# Patient Record
Sex: MALE | Race: ASIAN | NOT HISPANIC OR LATINO
[De-identification: names, ages, dates, MRNs, and addresses within clinical notes are randomized per-mention and may not be internally consistent; named-entity substitution may affect disease eponyms.]

---

## 2017-01-17 ENCOUNTER — APPOINTMENT (OUTPATIENT)
Dept: GASTROENTEROLOGY | Facility: CLINIC | Age: 72
End: 2017-01-17

## 2017-02-06 ENCOUNTER — APPOINTMENT (OUTPATIENT)
Dept: INTERNAL MEDICINE | Facility: CLINIC | Age: 72
End: 2017-02-06

## 2017-02-06 ENCOUNTER — LABORATORY RESULT (OUTPATIENT)
Age: 72
End: 2017-02-06

## 2017-02-06 VITALS
RESPIRATION RATE: 16 BRPM | BODY MASS INDEX: 31.58 KG/M2 | OXYGEN SATURATION: 97 % | DIASTOLIC BLOOD PRESSURE: 80 MMHG | WEIGHT: 185 LBS | TEMPERATURE: 98.2 F | SYSTOLIC BLOOD PRESSURE: 120 MMHG | HEIGHT: 64 IN | HEART RATE: 67 BPM

## 2017-02-06 DIAGNOSIS — J20.9 ACUTE BRONCHITIS, UNSPECIFIED: ICD-10-CM

## 2017-02-09 LAB
ALBUMIN SERPL ELPH-MCNC: 4.1 G/DL
ALP BLD-CCNC: 69 U/L
ALT SERPL-CCNC: 12 U/L
ANION GAP SERPL CALC-SCNC: 14 MMOL/L
AST SERPL-CCNC: 18 U/L
BASOPHILS # BLD AUTO: 0.1 K/UL
BASOPHILS NFR BLD AUTO: 1.3 %
BILIRUB SERPL-MCNC: 0.4 MG/DL
BUN SERPL-MCNC: 15 MG/DL
CALCIUM SERPL-MCNC: 9.5 MG/DL
CHLORIDE SERPL-SCNC: 105 MMOL/L
CHOLEST SERPL-MCNC: 145 MG/DL
CHOLEST/HDLC SERPL: 2.8 RATIO
CO2 SERPL-SCNC: 24 MMOL/L
CREAT SERPL-MCNC: 1.34 MG/DL
EOSINOPHIL # BLD AUTO: 0.25 K/UL
EOSINOPHIL NFR BLD AUTO: 3.3 %
GGT SERPL-CCNC: 17 U/L
GLUCOSE SERPL-MCNC: 108 MG/DL
HBA1C MFR BLD HPLC: 6.4 %
HCT VFR BLD CALC: 35.8 %
HDLC SERPL-MCNC: 52 MG/DL
HGB BLD-MCNC: 10.6 G/DL
IMM GRANULOCYTES NFR BLD AUTO: 0.3 %
LDLC SERPL CALC-MCNC: 66 MG/DL
LYMPHOCYTES # BLD AUTO: 1.75 K/UL
LYMPHOCYTES NFR BLD AUTO: 23.1 %
MAN DIFF?: NORMAL
MCHC RBC-ENTMCNC: 24.5 PG
MCHC RBC-ENTMCNC: 29.6 GM/DL
MCV RBC AUTO: 82.9 FL
MONOCYTES # BLD AUTO: 0.78 K/UL
MONOCYTES NFR BLD AUTO: 10.3 %
NEUTROPHILS # BLD AUTO: 4.68 K/UL
NEUTROPHILS NFR BLD AUTO: 61.7 %
PLATELET # BLD AUTO: 290 K/UL
POTASSIUM SERPL-SCNC: 4.5 MMOL/L
PROT SERPL-MCNC: 6.5 G/DL
RBC # BLD: 4.32 M/UL
RBC # FLD: 14.8 %
SODIUM SERPL-SCNC: 143 MMOL/L
T3 SERPL-MCNC: 101 NG/DL
T4 FREE SERPL-MCNC: 1.8 NG/DL
TRIGL SERPL-MCNC: 133 MG/DL
TSH SERPL-ACNC: 2.48 UIU/ML
WBC # FLD AUTO: 7.58 K/UL

## 2017-04-14 ENCOUNTER — TRANSCRIPTION ENCOUNTER (OUTPATIENT)
Age: 72
End: 2017-04-14

## 2017-05-09 ENCOUNTER — MOBILE ON CALL (OUTPATIENT)
Age: 72
End: 2017-05-09

## 2017-05-15 ENCOUNTER — TRANSCRIPTION ENCOUNTER (OUTPATIENT)
Age: 72
End: 2017-05-15

## 2017-05-24 ENCOUNTER — APPOINTMENT (OUTPATIENT)
Dept: CARDIOLOGY | Facility: CLINIC | Age: 72
End: 2017-05-24

## 2017-05-24 ENCOUNTER — NON-APPOINTMENT (OUTPATIENT)
Age: 72
End: 2017-05-24

## 2017-05-24 VITALS
HEIGHT: 64 IN | SYSTOLIC BLOOD PRESSURE: 113 MMHG | BODY MASS INDEX: 31.76 KG/M2 | OXYGEN SATURATION: 97 % | HEART RATE: 64 BPM | DIASTOLIC BLOOD PRESSURE: 72 MMHG | WEIGHT: 186 LBS

## 2017-06-05 ENCOUNTER — APPOINTMENT (OUTPATIENT)
Dept: INTERNAL MEDICINE | Facility: CLINIC | Age: 72
End: 2017-06-05

## 2017-06-05 VITALS
BODY MASS INDEX: 32.27 KG/M2 | TEMPERATURE: 97.9 F | OXYGEN SATURATION: 97 % | HEART RATE: 82 BPM | WEIGHT: 189 LBS | RESPIRATION RATE: 16 BRPM | SYSTOLIC BLOOD PRESSURE: 120 MMHG | HEIGHT: 64 IN | DIASTOLIC BLOOD PRESSURE: 80 MMHG

## 2017-06-05 RX ORDER — ESOMEPRAZOLE MAGNESIUM 20 MG/1
20 CAPSULE, DELAYED RELEASE ORAL
Qty: 90 | Refills: 0 | Status: DISCONTINUED | COMMUNITY
Start: 2017-02-03 | End: 2017-06-05

## 2017-06-08 ENCOUNTER — APPOINTMENT (OUTPATIENT)
Dept: CARDIOLOGY | Facility: CLINIC | Age: 72
End: 2017-06-08

## 2017-08-01 LAB
BASOPHILS # BLD AUTO: 0.07 K/UL
BASOPHILS NFR BLD AUTO: 0.9 %
EOSINOPHIL # BLD AUTO: 0.27 K/UL
EOSINOPHIL NFR BLD AUTO: 3.5 %
HCT VFR BLD CALC: 35.8 %
HGB BLD-MCNC: 11.1 G/DL
IMM GRANULOCYTES NFR BLD AUTO: 0 %
LYMPHOCYTES # BLD AUTO: 1.78 K/UL
LYMPHOCYTES NFR BLD AUTO: 23.3 %
MAN DIFF?: NORMAL
MCHC RBC-ENTMCNC: 25.6 PG
MCHC RBC-ENTMCNC: 31 GM/DL
MCV RBC AUTO: 82.5 FL
MONOCYTES # BLD AUTO: 0.75 K/UL
MONOCYTES NFR BLD AUTO: 9.8 %
NEUTROPHILS # BLD AUTO: 4.78 K/UL
NEUTROPHILS NFR BLD AUTO: 62.5 %
PLATELET # BLD AUTO: 275 K/UL
RBC # BLD: 4.34 M/UL
RBC # FLD: 15.4 %
WBC # FLD AUTO: 7.65 K/UL

## 2017-08-03 LAB
25(OH)D3 SERPL-MCNC: 37.3 NG/ML
ALBUMIN SERPL ELPH-MCNC: 3.9 G/DL
ALP BLD-CCNC: 84 U/L
ALT SERPL-CCNC: 13 U/L
ANION GAP SERPL CALC-SCNC: 14 MMOL/L
APPEARANCE: CLEAR
AST SERPL-CCNC: 18 U/L
BILIRUB SERPL-MCNC: 0.5 MG/DL
BILIRUBIN URINE: NEGATIVE
BLOOD URINE: NEGATIVE
BUN SERPL-MCNC: 16 MG/DL
CALCIUM SERPL-MCNC: 9.6 MG/DL
CHLORIDE SERPL-SCNC: 106 MMOL/L
CHOLEST SERPL-MCNC: 142 MG/DL
CHOLEST/HDLC SERPL: 3 RATIO
CO2 SERPL-SCNC: 21 MMOL/L
COLOR: YELLOW
CREAT SERPL-MCNC: 1.37 MG/DL
CREAT SPEC-SCNC: 49 MG/DL
ERYTHROCYTE [SEDIMENTATION RATE] IN BLOOD BY WESTERGREN METHOD: 20 MM/HR
FOLATE SERPL-MCNC: 15.2 NG/ML
GGT SERPL-CCNC: 18 U/L
GLUCOSE QUALITATIVE U: NORMAL MG/DL
GLUCOSE SERPL-MCNC: 98 MG/DL
HBA1C MFR BLD HPLC: 6 %
HDLC SERPL-MCNC: 47 MG/DL
HEMOCCULT STL QL IA: NEGATIVE
IRON SATN MFR SERPL: 11 %
IRON SERPL-MCNC: 41 UG/DL
KETONES URINE: NEGATIVE
LDLC SERPL CALC-MCNC: 74 MG/DL
LEUKOCYTE ESTERASE URINE: NEGATIVE
MICROALBUMIN 24H UR DL<=1MG/L-MCNC: 0.3 MG/DL
MICROALBUMIN/CREAT 24H UR-RTO: 6 MG/G
NITRITE URINE: NEGATIVE
PH URINE: 6
POTASSIUM SERPL-SCNC: 4.7 MMOL/L
PROT SERPL-MCNC: 6.4 G/DL
PROTEIN URINE: NEGATIVE MG/DL
PSA FREE FLD-MCNC: 58.7
PSA FREE SERPL-MCNC: 0.27 NG/ML
PSA SERPL-MCNC: 0.46 NG/ML
SODIUM SERPL-SCNC: 141 MMOL/L
SPECIFIC GRAVITY URINE: 1.01
T3 SERPL-MCNC: 99 NG/DL
T4 FREE SERPL-MCNC: 1.9 NG/DL
TIBC SERPL-MCNC: 383 UG/DL
TRIGL SERPL-MCNC: 104 MG/DL
TSH SERPL-ACNC: 2.15 UIU/ML
UIBC SERPL-MCNC: 342 UG/DL
UROBILINOGEN URINE: NORMAL MG/DL
VIT B12 SERPL-MCNC: 198 PG/ML

## 2017-09-05 ENCOUNTER — APPOINTMENT (OUTPATIENT)
Dept: INTERNAL MEDICINE | Facility: CLINIC | Age: 72
End: 2017-09-05

## 2017-09-26 ENCOUNTER — APPOINTMENT (OUTPATIENT)
Dept: INTERNAL MEDICINE | Facility: CLINIC | Age: 72
End: 2017-09-26
Payer: MEDICARE

## 2017-09-26 VITALS
RESPIRATION RATE: 18 BRPM | TEMPERATURE: 98.3 F | HEIGHT: 64 IN | BODY MASS INDEX: 31.92 KG/M2 | OXYGEN SATURATION: 98 % | WEIGHT: 187 LBS | HEART RATE: 71 BPM | SYSTOLIC BLOOD PRESSURE: 130 MMHG | DIASTOLIC BLOOD PRESSURE: 80 MMHG

## 2017-09-26 PROCEDURE — 99214 OFFICE O/P EST MOD 30 MIN: CPT

## 2017-10-24 ENCOUNTER — APPOINTMENT (OUTPATIENT)
Dept: GASTROENTEROLOGY | Facility: CLINIC | Age: 72
End: 2017-10-24
Payer: MEDICARE

## 2017-10-24 VITALS
SYSTOLIC BLOOD PRESSURE: 126 MMHG | WEIGHT: 185 LBS | RESPIRATION RATE: 16 BRPM | BODY MASS INDEX: 31.58 KG/M2 | DIASTOLIC BLOOD PRESSURE: 74 MMHG | TEMPERATURE: 97.8 F | HEART RATE: 88 BPM | HEIGHT: 64 IN

## 2017-10-24 PROCEDURE — 99204 OFFICE O/P NEW MOD 45 MIN: CPT

## 2017-10-24 RX ORDER — AMOXICILLIN AND CLAVULANATE POTASSIUM 500; 125 MG/1; MG/1
500-125 TABLET, FILM COATED ORAL TWICE DAILY
Qty: 14 | Refills: 0 | Status: DISCONTINUED | COMMUNITY
Start: 2017-09-26 | End: 2017-10-24

## 2017-10-24 RX ORDER — PROMETHAZINE HYDROCHLORIDE 6.25 MG/5ML
6.25 SOLUTION ORAL
Qty: 1 | Refills: 5 | Status: DISCONTINUED | COMMUNITY
Start: 2017-09-26 | End: 2017-10-24

## 2017-11-22 ENCOUNTER — APPOINTMENT (OUTPATIENT)
Dept: CARDIOLOGY | Facility: CLINIC | Age: 72
End: 2017-11-22
Payer: MEDICARE

## 2017-11-22 VITALS
WEIGHT: 180 LBS | HEIGHT: 64 IN | DIASTOLIC BLOOD PRESSURE: 76 MMHG | OXYGEN SATURATION: 97 % | SYSTOLIC BLOOD PRESSURE: 117 MMHG | HEART RATE: 65 BPM | BODY MASS INDEX: 30.73 KG/M2

## 2017-11-22 PROCEDURE — 93000 ELECTROCARDIOGRAM COMPLETE: CPT

## 2017-11-22 PROCEDURE — 99214 OFFICE O/P EST MOD 30 MIN: CPT

## 2017-11-22 RX ORDER — CHLORHEXIDINE GLUCONATE 4 %
325 (65 FE) LIQUID (ML) TOPICAL TWICE DAILY
Qty: 60 | Refills: 5 | Status: DISCONTINUED | COMMUNITY
End: 2017-11-22

## 2017-11-27 ENCOUNTER — OUTPATIENT (OUTPATIENT)
Dept: OUTPATIENT SERVICES | Facility: HOSPITAL | Age: 72
LOS: 1 days | End: 2017-11-27
Payer: MEDICARE

## 2017-11-27 ENCOUNTER — RESULT REVIEW (OUTPATIENT)
Age: 72
End: 2017-11-27

## 2017-11-27 DIAGNOSIS — Z95.5 PRESENCE OF CORONARY ANGIOPLASTY IMPLANT AND GRAFT: Chronic | ICD-10-CM

## 2017-11-27 DIAGNOSIS — D64.9 ANEMIA, UNSPECIFIED: ICD-10-CM

## 2017-11-27 DIAGNOSIS — Z98.89 OTHER SPECIFIED POSTPROCEDURAL STATES: Chronic | ICD-10-CM

## 2017-11-27 DIAGNOSIS — H26.40 UNSPECIFIED SECONDARY CATARACT: Chronic | ICD-10-CM

## 2017-11-27 DIAGNOSIS — Z87.442 PERSONAL HISTORY OF URINARY CALCULI: Chronic | ICD-10-CM

## 2017-11-27 DIAGNOSIS — Z96.652 PRESENCE OF LEFT ARTIFICIAL KNEE JOINT: Chronic | ICD-10-CM

## 2017-11-27 PROCEDURE — 88305 TISSUE EXAM BY PATHOLOGIST: CPT

## 2017-11-27 PROCEDURE — 45380 COLONOSCOPY AND BIOPSY: CPT

## 2017-11-27 PROCEDURE — 88312 SPECIAL STAINS GROUP 1: CPT

## 2017-11-27 PROCEDURE — 43239 EGD BIOPSY SINGLE/MULTIPLE: CPT

## 2017-11-27 PROCEDURE — 88312 SPECIAL STAINS GROUP 1: CPT | Mod: 26

## 2017-11-27 PROCEDURE — 88305 TISSUE EXAM BY PATHOLOGIST: CPT | Mod: 26

## 2017-12-19 ENCOUNTER — APPOINTMENT (OUTPATIENT)
Dept: GASTROENTEROLOGY | Facility: CLINIC | Age: 72
End: 2017-12-19
Payer: MEDICARE

## 2017-12-19 ENCOUNTER — APPOINTMENT (OUTPATIENT)
Dept: INTERNAL MEDICINE | Facility: CLINIC | Age: 72
End: 2017-12-19
Payer: MEDICARE

## 2017-12-19 VITALS
HEART RATE: 82 BPM | BODY MASS INDEX: 30.73 KG/M2 | SYSTOLIC BLOOD PRESSURE: 100 MMHG | HEIGHT: 64 IN | WEIGHT: 180 LBS | TEMPERATURE: 98.1 F | DIASTOLIC BLOOD PRESSURE: 62 MMHG | RESPIRATION RATE: 16 BRPM

## 2017-12-19 VITALS
TEMPERATURE: 98.1 F | RESPIRATION RATE: 18 BRPM | HEIGHT: 64 IN | BODY MASS INDEX: 30.73 KG/M2 | WEIGHT: 180 LBS | SYSTOLIC BLOOD PRESSURE: 110 MMHG | HEART RATE: 82 BPM | DIASTOLIC BLOOD PRESSURE: 70 MMHG

## 2017-12-19 PROCEDURE — G0009: CPT

## 2017-12-19 PROCEDURE — 99214 OFFICE O/P EST MOD 30 MIN: CPT | Mod: 25

## 2017-12-19 PROCEDURE — 90670 PCV13 VACCINE IM: CPT

## 2017-12-19 PROCEDURE — 99213 OFFICE O/P EST LOW 20 MIN: CPT

## 2017-12-20 LAB
ALBUMIN SERPL ELPH-MCNC: 3.8 G/DL
ALP BLD-CCNC: 85 U/L
ALT SERPL-CCNC: 13 U/L
ANION GAP SERPL CALC-SCNC: 12 MMOL/L
AST SERPL-CCNC: 19 U/L
BASOPHILS # BLD AUTO: 0.05 K/UL
BASOPHILS NFR BLD AUTO: 0.7 %
BILIRUB SERPL-MCNC: 0.5 MG/DL
BUN SERPL-MCNC: 16 MG/DL
CALCIUM SERPL-MCNC: 9.8 MG/DL
CHLORIDE SERPL-SCNC: 108 MMOL/L
CHOLEST SERPL-MCNC: 136 MG/DL
CHOLEST/HDLC SERPL: 2.8 RATIO
CO2 SERPL-SCNC: 21 MMOL/L
CREAT SERPL-MCNC: 1.55 MG/DL
EOSINOPHIL # BLD AUTO: 0.3 K/UL
EOSINOPHIL NFR BLD AUTO: 4.2
GGT SERPL-CCNC: 23 U/L
GLUCOSE SERPL-MCNC: 86 MG/DL
HBA1C MFR BLD HPLC: 6 %
HCT VFR BLD CALC: 39.2 %
HDLC SERPL-MCNC: 48 MG/DL
HGB BLD-MCNC: 12.2 G/DL
IMM GRANULOCYTES NFR BLD AUTO: 0.4 %
LDLC SERPL CALC-MCNC: 51 MG/DL
LYMPHOCYTES # BLD AUTO: 1.78 K/UL
LYMPHOCYTES NFR BLD AUTO: 24.9 %
MAN DIFF?: NORMAL
MCHC RBC-ENTMCNC: 26.8 PG
MCHC RBC-ENTMCNC: 31.1 GM/DL
MCV RBC AUTO: 86.2 FL
MONOCYTES # BLD AUTO: 0.92 K/UL
MONOCYTES NFR BLD AUTO: 12.9 %
NEUTROPHILS # BLD AUTO: 4.06 K/UL
NEUTROPHILS NFR BLD AUTO: 56.9 %
PLATELET # BLD AUTO: 268 K/UL
POTASSIUM SERPL-SCNC: 4.5 MMOL/L
PROT SERPL-MCNC: 6.5 G/DL
RBC # BLD: 4.55 M/UL
RBC # FLD: 14.8 %
SODIUM SERPL-SCNC: 141 MMOL/L
T3 SERPL-MCNC: 108 NG/DL
T4 FREE SERPL-MCNC: 2.2 NG/DL
TRIGL SERPL-MCNC: 185 MG/DL
TSH SERPL-ACNC: 0.25 UIU/ML
WBC # FLD AUTO: 7.14 K/UL

## 2018-02-01 ENCOUNTER — INPATIENT (INPATIENT)
Facility: HOSPITAL | Age: 73
LOS: 0 days | Discharge: HOME | DRG: 313 | End: 2018-02-02
Attending: INTERNAL MEDICINE | Admitting: INTERNAL MEDICINE
Payer: MEDICARE

## 2018-02-01 VITALS
HEART RATE: 72 BPM | TEMPERATURE: 98 F | RESPIRATION RATE: 18 BRPM | OXYGEN SATURATION: 98 % | SYSTOLIC BLOOD PRESSURE: 119 MMHG | DIASTOLIC BLOOD PRESSURE: 63 MMHG

## 2018-02-01 DIAGNOSIS — E78.5 HYPERLIPIDEMIA, UNSPECIFIED: ICD-10-CM

## 2018-02-01 DIAGNOSIS — I10 ESSENTIAL (PRIMARY) HYPERTENSION: ICD-10-CM

## 2018-02-01 DIAGNOSIS — H26.40 UNSPECIFIED SECONDARY CATARACT: Chronic | ICD-10-CM

## 2018-02-01 DIAGNOSIS — Z87.442 PERSONAL HISTORY OF URINARY CALCULI: Chronic | ICD-10-CM

## 2018-02-01 DIAGNOSIS — E03.9 HYPOTHYROIDISM, UNSPECIFIED: ICD-10-CM

## 2018-02-01 DIAGNOSIS — R07.9 CHEST PAIN, UNSPECIFIED: ICD-10-CM

## 2018-02-01 DIAGNOSIS — Z95.5 PRESENCE OF CORONARY ANGIOPLASTY IMPLANT AND GRAFT: Chronic | ICD-10-CM

## 2018-02-01 DIAGNOSIS — Z98.89 OTHER SPECIFIED POSTPROCEDURAL STATES: Chronic | ICD-10-CM

## 2018-02-01 DIAGNOSIS — Z96.652 PRESENCE OF LEFT ARTIFICIAL KNEE JOINT: Chronic | ICD-10-CM

## 2018-02-01 DIAGNOSIS — Z29.9 ENCOUNTER FOR PROPHYLACTIC MEASURES, UNSPECIFIED: ICD-10-CM

## 2018-02-01 DIAGNOSIS — I25.10 ATHEROSCLEROTIC HEART DISEASE OF NATIVE CORONARY ARTERY WITHOUT ANGINA PECTORIS: ICD-10-CM

## 2018-02-01 LAB
ALBUMIN SERPL ELPH-MCNC: 3.4 G/DL — LOW (ref 3.5–5)
ALP SERPL-CCNC: 94 U/L — SIGNIFICANT CHANGE UP (ref 40–120)
ALT FLD-CCNC: 20 U/L DA — SIGNIFICANT CHANGE UP (ref 10–60)
ANION GAP SERPL CALC-SCNC: 6 MMOL/L — SIGNIFICANT CHANGE UP (ref 5–17)
AST SERPL-CCNC: 24 U/L — SIGNIFICANT CHANGE UP (ref 10–40)
BASOPHILS # BLD AUTO: 0.1 K/UL — SIGNIFICANT CHANGE UP (ref 0–0.2)
BASOPHILS NFR BLD AUTO: 1.1 % — SIGNIFICANT CHANGE UP (ref 0–2)
BILIRUB SERPL-MCNC: 0.3 MG/DL — SIGNIFICANT CHANGE UP (ref 0.2–1.2)
BUN SERPL-MCNC: 18 MG/DL — SIGNIFICANT CHANGE UP (ref 7–18)
CALCIUM SERPL-MCNC: 9.6 MG/DL — SIGNIFICANT CHANGE UP (ref 8.4–10.5)
CHLORIDE SERPL-SCNC: 109 MMOL/L — HIGH (ref 96–108)
CK MB BLD-MCNC: 2.1 % — SIGNIFICANT CHANGE UP (ref 0–3.5)
CK MB CFR SERPL CALC: 4.6 NG/ML — HIGH (ref 0–3.6)
CK SERPL-CCNC: 214 U/L — SIGNIFICANT CHANGE UP (ref 35–232)
CO2 SERPL-SCNC: 27 MMOL/L — SIGNIFICANT CHANGE UP (ref 22–31)
CREAT SERPL-MCNC: 1.58 MG/DL — HIGH (ref 0.5–1.3)
EOSINOPHIL # BLD AUTO: 0.3 K/UL — SIGNIFICANT CHANGE UP (ref 0–0.5)
EOSINOPHIL NFR BLD AUTO: 3 % — SIGNIFICANT CHANGE UP (ref 0–6)
GLUCOSE SERPL-MCNC: 107 MG/DL — HIGH (ref 70–99)
HCT VFR BLD CALC: 41.4 % — SIGNIFICANT CHANGE UP (ref 39–50)
HGB BLD-MCNC: 12.4 G/DL — LOW (ref 13–17)
LIDOCAIN IGE QN: 202 U/L — SIGNIFICANT CHANGE UP (ref 73–393)
LYMPHOCYTES # BLD AUTO: 2 K/UL — SIGNIFICANT CHANGE UP (ref 1–3.3)
LYMPHOCYTES # BLD AUTO: 22.1 % — SIGNIFICANT CHANGE UP (ref 13–44)
MCHC RBC-ENTMCNC: 25.8 PG — LOW (ref 27–34)
MCHC RBC-ENTMCNC: 29.9 GM/DL — LOW (ref 32–36)
MCV RBC AUTO: 86.3 FL — SIGNIFICANT CHANGE UP (ref 80–100)
MONOCYTES # BLD AUTO: 0.8 K/UL — SIGNIFICANT CHANGE UP (ref 0–0.9)
MONOCYTES NFR BLD AUTO: 9.3 % — SIGNIFICANT CHANGE UP (ref 2–14)
NEUTROPHILS # BLD AUTO: 5.7 K/UL — SIGNIFICANT CHANGE UP (ref 1.8–7.4)
NEUTROPHILS NFR BLD AUTO: 64.4 % — SIGNIFICANT CHANGE UP (ref 43–77)
NT-PROBNP SERPL-SCNC: 159 PG/ML — SIGNIFICANT CHANGE UP (ref 0–450)
PLATELET # BLD AUTO: 314 K/UL — SIGNIFICANT CHANGE UP (ref 150–400)
POTASSIUM SERPL-MCNC: 4.3 MMOL/L — SIGNIFICANT CHANGE UP (ref 3.5–5.3)
POTASSIUM SERPL-SCNC: 4.3 MMOL/L — SIGNIFICANT CHANGE UP (ref 3.5–5.3)
PROT SERPL-MCNC: 7.2 G/DL — SIGNIFICANT CHANGE UP (ref 6–8.3)
RBC # BLD: 4.79 M/UL — SIGNIFICANT CHANGE UP (ref 4.2–5.8)
RBC # FLD: 14.2 % — SIGNIFICANT CHANGE UP (ref 10.3–14.5)
SODIUM SERPL-SCNC: 142 MMOL/L — SIGNIFICANT CHANGE UP (ref 135–145)
TROPONIN I SERPL-MCNC: <0.015 NG/ML — SIGNIFICANT CHANGE UP (ref 0–0.04)
WBC # BLD: 8.9 K/UL — SIGNIFICANT CHANGE UP (ref 3.8–10.5)
WBC # FLD AUTO: 8.9 K/UL — SIGNIFICANT CHANGE UP (ref 3.8–10.5)

## 2018-02-01 PROCEDURE — 99223 1ST HOSP IP/OBS HIGH 75: CPT | Mod: AI,GC

## 2018-02-01 PROCEDURE — 71046 X-RAY EXAM CHEST 2 VIEWS: CPT | Mod: 26

## 2018-02-01 PROCEDURE — 99285 EMERGENCY DEPT VISIT HI MDM: CPT

## 2018-02-01 RX ORDER — ATORVASTATIN CALCIUM 80 MG/1
40 TABLET, FILM COATED ORAL AT BEDTIME
Qty: 0 | Refills: 0 | Status: DISCONTINUED | OUTPATIENT
Start: 2018-02-01 | End: 2018-02-02

## 2018-02-01 RX ORDER — SODIUM CHLORIDE 9 MG/ML
3 INJECTION INTRAMUSCULAR; INTRAVENOUS; SUBCUTANEOUS ONCE
Qty: 0 | Refills: 0 | Status: COMPLETED | OUTPATIENT
Start: 2018-02-01 | End: 2018-02-01

## 2018-02-01 RX ORDER — ASPIRIN/CALCIUM CARB/MAGNESIUM 324 MG
325 TABLET ORAL
Qty: 0 | Refills: 0 | Status: DISCONTINUED | OUTPATIENT
Start: 2018-02-01 | End: 2018-02-01

## 2018-02-01 RX ORDER — ASPIRIN/CALCIUM CARB/MAGNESIUM 324 MG
325 TABLET ORAL ONCE
Qty: 0 | Refills: 0 | Status: COMPLETED | OUTPATIENT
Start: 2018-02-01 | End: 2018-02-01

## 2018-02-01 RX ORDER — METOPROLOL TARTRATE 50 MG
25 TABLET ORAL
Qty: 0 | Refills: 0 | Status: DISCONTINUED | OUTPATIENT
Start: 2018-02-01 | End: 2018-02-02

## 2018-02-01 RX ORDER — ASPIRIN/CALCIUM CARB/MAGNESIUM 324 MG
81 TABLET ORAL DAILY
Qty: 0 | Refills: 0 | Status: DISCONTINUED | OUTPATIENT
Start: 2018-02-02 | End: 2018-02-02

## 2018-02-01 RX ORDER — PANTOPRAZOLE SODIUM 20 MG/1
40 TABLET, DELAYED RELEASE ORAL
Qty: 0 | Refills: 0 | Status: DISCONTINUED | OUTPATIENT
Start: 2018-02-01 | End: 2018-02-02

## 2018-02-01 RX ORDER — LEVOTHYROXINE SODIUM 125 MCG
112 TABLET ORAL DAILY
Qty: 0 | Refills: 0 | Status: DISCONTINUED | OUTPATIENT
Start: 2018-02-01 | End: 2018-02-02

## 2018-02-01 RX ADMIN — ATORVASTATIN CALCIUM 40 MILLIGRAM(S): 80 TABLET, FILM COATED ORAL at 19:55

## 2018-02-01 RX ADMIN — SODIUM CHLORIDE 3 MILLILITER(S): 9 INJECTION INTRAMUSCULAR; INTRAVENOUS; SUBCUTANEOUS at 17:16

## 2018-02-01 RX ADMIN — Medication 325 MILLIGRAM(S): at 19:55

## 2018-02-01 NOTE — H&P ADULT - HISTORY OF PRESENT ILLNESS
71 y/o M  with PMHx of CAD s/p 1 stent , pre Diabetes, HLD (controlled with medication), HTN (controlled with medication), Hypothyroidism, Kidney Stones, and Obesity  presents to ED c/o intermittent chest pain x 3 days. Pt describes CP as pressure-like, and of sudden onset with slight radiation to the left.  Pt  also has been complaining of dry cough notes having a dry (non-productive) for about a week, but has improve. Effort to contact his radiologist Dr. Cardenas, NPO called back and states that pt had normal stress test 2 years ago. Pt denies fever, chills, vomiting, b/l leg swelling, SOB, dizziness, lightheadedness, or any other complaints.

## 2018-02-01 NOTE — H&P ADULT - NSHPPHYSICALEXAM_GEN_ALL_CORE
Gen: NAD  HEENT: supple neck, no JVD  CVS: RRR, no RMG  Lungs: CTA x2, no wheezing, no rales, no rhonchi  Abd: soft, non tender, no rigidity, no BS  Ext: no edema, no cyanosis  Neuro: AAOx3, no focal findings  Skin: no rashes no blisters

## 2018-02-01 NOTE — ED PROVIDER NOTE - PROGRESS NOTE DETAILS
Made effort to contact Dr. Cardenas pt cardiologist, callback from NP, also d/w pt daughter MD who agree to plan.

## 2018-02-01 NOTE — ED PROVIDER NOTE - PSH
After cataract, bilateral    H/O heart artery stent  2013  H/O total knee replacement, left    History of kidney stones    S/P hernia repair  left inguinal

## 2018-02-01 NOTE — ED ADULT NURSE NOTE - ED STAT RN HANDOFF DETAILS 2
Received pt from [facility], in no distress. Pt on tele, breathing room air. Wife at bedside. Nursing monitoring continues.

## 2018-02-01 NOTE — H&P ADULT - FAMILY HISTORY
Family history of hypertension, mother     Sibling  Still living? Yes, Estimated age: Age Unknown  Family history of diabetes mellitus, Age at diagnosis: Age Unknown

## 2018-02-01 NOTE — H&P ADULT - ASSESSMENT
71 y/o M  with PMHx of CAD s/p 1 stent , pre Diabetes, HLD (controlled with medication), HTN (controlled with medication), Hypothyroidism, Kidney Stones, and Obesity  presents to ED c/o intermittent chest pain x 3 days. Pt describes CP as pressure-like, and of sudden onset with slight radiation to the left.  Pt  also has been complaining of dry cough notes having a dry (non-productive) for about a week, but has improve.

## 2018-02-01 NOTE — H&P ADULT - ATTENDING COMMENTS
Patient was seen and examined in the ED with Dr. Dill yesterday.   He presented with intermittent chest pain, including  at rest.  He has a history of DM, HLD, HTN, and ASCVD.      WDWN 73 yo man in NAD  Lungs, clear  Cor, RRR    IMP:  Chest pain in a patient with known ASCVD.  No current EKG changes.  R/o MI.   HTN, DM, hypothyroidism, stable.    Plan:  Tele/troponin/cardiology consultation.           I have left a voicemail for Dr. Thad Ewing, his PMD.

## 2018-02-01 NOTE — ED PROVIDER NOTE - PMH
Borderline diabetes    CAD (coronary artery disease)    Hyperlipidemia    Hypertension    Hypothyroid    Obesity (BMI 30-39.9)

## 2018-02-01 NOTE — H&P ADULT - PROBLEM SELECTOR PLAN 1
74 yo M w/ hx CAD s/p 1 stent present to ED complaints of CP x 3 days  Heart score= 3  Pt has been complaining of cough for about a week which could be also the cause of the pain   Due to high risk with r/o ACS  Admit to tele  ASA, statin, BB  t1 negative, trend trops   will get echo   As per cardiologist office pt has negative stress test 2 years ago   Cardio: Dr Fonseca 72 yo M w/ hx CAD s/p 1 stent present to ED complaints of CP x 3 days  Heart score= 3  Pt has been complaining of cough for about a week which could be also the cause of the pain   Due to high risk with r/o ACS  EKG with no ischemic changes   Admit to tele  ASA, statin, BB  t1 negative, trend trops   will get echo   As per cardiologist office pt has negative stress test 2 years ago   Cardio: Dr Fonseca

## 2018-02-01 NOTE — ED PROVIDER NOTE - OBJECTIVE STATEMENT
73 y/o M pt with PMHx of CAD, Borderline Diabetes, HLD (controlled with medication), HTN (controlled with medication), Hypothyroidism, Kidney Stones, and Obesity and PSHx of b/l Cataract Surgery, Heart Artery Stent Placement, L TKR, and L Inguinal Hernia Repair presents with wife to ED c/o intermittent chest pain x2 days. Pt describes CP as pressure-like, and of sudden onset; CP began while pt was attempting to sleep x2 days ago. Per pt, CP is currently mild in ED. Pt additionally reports back pain. Pt notes having a dry (non-productive) cough approximately x1 week ago, which has since resolved. Pt denies fever, chills, phlegm, vomiting, b/l leg swelling, SOB, dizziness, lightheadedness, or any other complaints. Pt also denies Hx of MI, or having had similar sx's in the past. Pt states having a normal appetite. PMD: Dr. Thad Ewing.

## 2018-02-01 NOTE — ED PROVIDER NOTE - CHPI ED SYMPTOMS NEG
no chills/no phlegm, no b/l leg swelling, no lightheadedness/no shortness of breath/no dizziness/no fever/no vomiting

## 2018-02-02 ENCOUNTER — TRANSCRIPTION ENCOUNTER (OUTPATIENT)
Age: 73
End: 2018-02-02

## 2018-02-02 VITALS
SYSTOLIC BLOOD PRESSURE: 139 MMHG | OXYGEN SATURATION: 100 % | HEART RATE: 60 BPM | TEMPERATURE: 98 F | RESPIRATION RATE: 18 BRPM | DIASTOLIC BLOOD PRESSURE: 75 MMHG

## 2018-02-02 LAB
ANION GAP SERPL CALC-SCNC: 6 MMOL/L — SIGNIFICANT CHANGE UP (ref 5–17)
BUN SERPL-MCNC: 17 MG/DL — SIGNIFICANT CHANGE UP (ref 7–18)
CALCIUM SERPL-MCNC: 9.6 MG/DL — SIGNIFICANT CHANGE UP (ref 8.4–10.5)
CHLORIDE SERPL-SCNC: 112 MMOL/L — HIGH (ref 96–108)
CHOLEST SERPL-MCNC: 135 MG/DL — SIGNIFICANT CHANGE UP (ref 10–199)
CK MB BLD-MCNC: 2.1 % — SIGNIFICANT CHANGE UP (ref 0–3.5)
CK MB BLD-MCNC: 2.3 % — SIGNIFICANT CHANGE UP (ref 0–3.5)
CK MB CFR SERPL CALC: 2.7 NG/ML — SIGNIFICANT CHANGE UP (ref 0–3.6)
CK MB CFR SERPL CALC: 3.6 NG/ML — SIGNIFICANT CHANGE UP (ref 0–3.6)
CK SERPL-CCNC: 128 U/L — SIGNIFICANT CHANGE UP (ref 35–232)
CK SERPL-CCNC: 158 U/L — SIGNIFICANT CHANGE UP (ref 35–232)
CO2 SERPL-SCNC: 28 MMOL/L — SIGNIFICANT CHANGE UP (ref 22–31)
CREAT SERPL-MCNC: 1.41 MG/DL — HIGH (ref 0.5–1.3)
FOLATE SERPL-MCNC: 14.9 NG/ML — SIGNIFICANT CHANGE UP (ref 4.8–24.2)
GLUCOSE SERPL-MCNC: 100 MG/DL — HIGH (ref 70–99)
HBA1C BLD-MCNC: 6.2 % — HIGH (ref 4–5.6)
HCT VFR BLD CALC: 40.4 % — SIGNIFICANT CHANGE UP (ref 39–50)
HDLC SERPL-MCNC: 43 MG/DL — SIGNIFICANT CHANGE UP (ref 40–125)
HGB BLD-MCNC: 12.1 G/DL — LOW (ref 13–17)
LIPID PNL WITH DIRECT LDL SERPL: 63 MG/DL — SIGNIFICANT CHANGE UP
MAGNESIUM SERPL-MCNC: 2.2 MG/DL — SIGNIFICANT CHANGE UP (ref 1.6–2.6)
MCHC RBC-ENTMCNC: 26 PG — LOW (ref 27–34)
MCHC RBC-ENTMCNC: 30 GM/DL — LOW (ref 32–36)
MCV RBC AUTO: 86.8 FL — SIGNIFICANT CHANGE UP (ref 80–100)
PHOSPHATE SERPL-MCNC: 3 MG/DL — SIGNIFICANT CHANGE UP (ref 2.5–4.5)
PLATELET # BLD AUTO: 272 K/UL — SIGNIFICANT CHANGE UP (ref 150–400)
POTASSIUM SERPL-MCNC: 4 MMOL/L — SIGNIFICANT CHANGE UP (ref 3.5–5.3)
POTASSIUM SERPL-SCNC: 4 MMOL/L — SIGNIFICANT CHANGE UP (ref 3.5–5.3)
RAPID RVP RESULT: SIGNIFICANT CHANGE UP
RBC # BLD: 4.65 M/UL — SIGNIFICANT CHANGE UP (ref 4.2–5.8)
RBC # FLD: 14.2 % — SIGNIFICANT CHANGE UP (ref 10.3–14.5)
SODIUM SERPL-SCNC: 146 MMOL/L — HIGH (ref 135–145)
TOTAL CHOLESTEROL/HDL RATIO MEASUREMENT: 3.1 RATIO — LOW (ref 3.4–9.6)
TRIGL SERPL-MCNC: 147 MG/DL — SIGNIFICANT CHANGE UP (ref 10–149)
TROPONIN I SERPL-MCNC: <0.015 NG/ML — SIGNIFICANT CHANGE UP (ref 0–0.04)
TROPONIN I SERPL-MCNC: <0.015 NG/ML — SIGNIFICANT CHANGE UP (ref 0–0.04)
TSH SERPL-MCNC: 5.58 UU/ML — HIGH (ref 0.34–4.82)
VIT B12 SERPL-MCNC: 311 PG/ML — SIGNIFICANT CHANGE UP (ref 232–1245)
WBC # BLD: 8.9 K/UL — SIGNIFICANT CHANGE UP (ref 3.8–10.5)
WBC # FLD AUTO: 8.9 K/UL — SIGNIFICANT CHANGE UP (ref 3.8–10.5)

## 2018-02-02 PROCEDURE — 82746 ASSAY OF FOLIC ACID SERUM: CPT

## 2018-02-02 PROCEDURE — 83036 HEMOGLOBIN GLYCOSYLATED A1C: CPT

## 2018-02-02 PROCEDURE — 84443 ASSAY THYROID STIM HORMONE: CPT

## 2018-02-02 PROCEDURE — 82550 ASSAY OF CK (CPK): CPT

## 2018-02-02 PROCEDURE — 82607 VITAMIN B-12: CPT

## 2018-02-02 PROCEDURE — 99239 HOSP IP/OBS DSCHRG MGMT >30: CPT

## 2018-02-02 PROCEDURE — 84100 ASSAY OF PHOSPHORUS: CPT

## 2018-02-02 PROCEDURE — 83690 ASSAY OF LIPASE: CPT

## 2018-02-02 PROCEDURE — 80061 LIPID PANEL: CPT

## 2018-02-02 PROCEDURE — 83880 ASSAY OF NATRIURETIC PEPTIDE: CPT

## 2018-02-02 PROCEDURE — 83735 ASSAY OF MAGNESIUM: CPT

## 2018-02-02 PROCEDURE — 84484 ASSAY OF TROPONIN QUANT: CPT

## 2018-02-02 PROCEDURE — 99222 1ST HOSP IP/OBS MODERATE 55: CPT

## 2018-02-02 PROCEDURE — 82553 CREATINE MB FRACTION: CPT

## 2018-02-02 PROCEDURE — 71046 X-RAY EXAM CHEST 2 VIEWS: CPT

## 2018-02-02 PROCEDURE — 87633 RESP VIRUS 12-25 TARGETS: CPT

## 2018-02-02 PROCEDURE — 80053 COMPREHEN METABOLIC PANEL: CPT

## 2018-02-02 PROCEDURE — 93005 ELECTROCARDIOGRAM TRACING: CPT

## 2018-02-02 PROCEDURE — 99285 EMERGENCY DEPT VISIT HI MDM: CPT | Mod: 25

## 2018-02-02 PROCEDURE — 80048 BASIC METABOLIC PNL TOTAL CA: CPT

## 2018-02-02 PROCEDURE — 85027 COMPLETE CBC AUTOMATED: CPT

## 2018-02-02 RX ADMIN — PANTOPRAZOLE SODIUM 40 MILLIGRAM(S): 20 TABLET, DELAYED RELEASE ORAL at 08:15

## 2018-02-02 RX ADMIN — Medication 25 MILLIGRAM(S): at 06:37

## 2018-02-02 RX ADMIN — Medication 81 MILLIGRAM(S): at 10:56

## 2018-02-02 RX ADMIN — Medication 112 MICROGRAM(S): at 06:37

## 2018-02-02 NOTE — PROGRESS NOTE ADULT - SUBJECTIVE AND OBJECTIVE BOX
HPI:  73 y/o M  with PMHx of CAD s/p 1 stent , pre Diabetes, HLD (controlled with medication), HTN (controlled with medication), Hypothyroidism, Kidney Stones, and Obesity  presents to ED c/o intermittent chest pain x 3 days. Pt describes CP as pressure-like, and of sudden onset with slight radiation to the left.  Pt  also has been complaining of dry cough notes having a dry (non-productive) for about a week, but has improve. Effort to contact his radiologist Dr. Cardenas, NPO called back and states that pt had normal stress test 2 years ago. Pt denies fever, chills, vomiting, b/l leg swelling, SOB, dizziness, lightheadedness, or any other complaints. (01 Feb 2018 19:26)      Patient is a 72y old  Male who presents with a chief complaint of chest pain x 3 days (01 Feb 2018 19:26)      INTERVAL HPI/OVERNIGHT EVENTS:  T(C): 36.5 (02-02-18 @ 11:30), Max: 36.9 (02-01-18 @ 20:35)  HR: 60 (02-02-18 @ 11:30) (60 - 76)  BP: 139/75 (02-02-18 @ 11:30) (119/55 - 139/75)  RR: 18 (02-02-18 @ 11:30) (17 - 18)  SpO2: 100% (02-02-18 @ 11:30) (97% - 100%)  Wt(kg): --  I&O's Summary      REVIEW OF SYSTEMS: denies fever, chills, SOB, palpitations, chest pain, abdominal pain, nausea, vomitting, diarrhea, constipation, dizziness    MEDICATIONS  (STANDING):  aspirin enteric coated 81 milliGRAM(s) Oral daily  atorvastatin 40 milliGRAM(s) Oral at bedtime  levothyroxine 112 MICROGram(s) Oral daily  metoprolol     tartrate 25 milliGRAM(s) Oral two times a day  pantoprazole    Tablet 40 milliGRAM(s) Oral before breakfast    MEDICATIONS  (PRN):      PHYSICAL EXAM:  GENERAL: NAD, well-groomed, well-developed  HEAD:  Atraumatic, Normocephalic  EYES: EOMI, PERRLA, conjunctiva and sclera clear  ENMT: No tonsillar erythema, exudates, or enlargement; Moist mucous membranes, Good dentition, No lesions  NECK: Supple, No JVD, Normal thyroid  NERVOUS SYSTEM:  Alert & Oriented X3, Good concentration; Motor Strength 5/5 B/L upper and lower extremities; DTRs 2+ intact and symmetric  CHEST/LUNG: Clear to percussion bilaterally; No rales, rhonchi, wheezing, or rubs  HEART: Regular rate and rhythm; No murmurs, rubs, or gallops  ABDOMEN: Soft, Nontender, Nondistended; Bowel sounds present  EXTREMITIES:  2+ Peripheral Pulses, No clubbing, cyanosis, or edema  LYMPH: No lymphadenopathy noted  SKIN: No rashes or lesions  LABS:                        12.1   8.9   )-----------( 272      ( 02 Feb 2018 06:03 )             40.4     02-02    146<H>  |  112<H>  |  17  ----------------------------<  100<H>  4.0   |  28  |  1.41<H>    Ca    9.6      02 Feb 2018 06:03  Phos  3.0     02-02  Mg     2.2     02-02    TPro  7.2  /  Alb  3.4<L>  /  TBili  0.3  /  DBili  x   /  AST  24  /  ALT  20  /  AlkPhos  94  02-01    Troponin I, Serum (02.02.18 @ 06:03)    Troponin I, Serum: <0.015: The new reference range for     Troponin I, Serum (02.02.18 @ 00:33)    Troponin I, Serum: <0.015:     Troponin I, Serum (02.01.18 @ 17:06)    Troponin I, Serum: <0.015: The new reference range for     IMP:  ASCVD, clinically stable.  Cardiology consultation, seen and appreciated.  Patient is scheduled for elective stress test next week.     Plan:  D/C home.  F/u stress test at Dr. Cardenas, next week.   F/u Dr. Ewing.

## 2018-02-02 NOTE — CONSULT NOTE ADULT - SUBJECTIVE AND OBJECTIVE BOX
CHIEF COMPLAINT: Chest pain    HPI: 73 yo M with CAD s/p stenting (2013), HTN, HLD who presented with chest pain. Patient reports    PAST MEDICAL & SURGICAL HISTORY:  As above, also Borderline diabetes, Obesity (BMI 30-39.9), Hypothyroid, S/P hernia repair: left inguinal, History of kidney stones,  H/O total knee replacement, left      Allergies    No Known Allergies    MEDICATIONS  (STANDING):  aspirin enteric coated 81 milliGRAM(s) Oral daily  atorvastatin 40 milliGRAM(s) Oral at bedtime  levothyroxine 112 MICROGram(s) Oral daily  metoprolol     tartrate 25 milliGRAM(s) Oral two times a day  pantoprazole    Tablet 40 milliGRAM(s) Oral before breakfast    MEDICATIONS  (PRN):      FAMILY HISTORY:  Family history of hypertension: mother  Family history of diabetes mellitus (Sibling): sister and bothers    No family history of premature coronary artery disease or sudden cardiac death    SOCIAL HISTORY:  Smoking-  Alcohol-  Ilicit Drug use-    REVIEW OF SYSTEMS:  Constitutional: [ ] fever, [ ]weight loss,  [ ]fatigue  Eyes: [ ] visual changes  Respiratory: [ ]shortness of breath;  [ ] cough, [ ]wheezing, [ ]chills, [ ]hemoptysis  Cardiovascular: [ ] chest pain, [ ]palpitations, [ ]dizziness,  [ ]leg swelling  Gastrointestinal: [ ] abdominal pain, [ ]nausea, [ ]vomiting,  [ ]diarrhea   Genitourinary: [ ] dysuria, [ ] hematuria  Neurologic: [ ] headaches [ ] tremors  [ ] weakness [ ] lightheadedness  Skin: [ ] itching, [ ]burning, [ ] rashes  Endocrine: [ ] heat or cold intolerance  Musculoskeletal: [ ] joint pain or swelling; [ ] muscle, back, or extremity pain  Psychiatric: [ ] depression, [ ]anxiety, [ ]mood swings, or [ ]difficulty sleeping  Hematologic: [ ] easy bruising, [ ] bleeding gums       [ x] All others negative	  [ ] Unable to obtain    Vital Signs Last 24 Hrs  T(C): 36.7 (02 Feb 2018 07:48), Max: 36.9 (01 Feb 2018 20:35)  T(F): 98 (02 Feb 2018 07:48), Max: 98.4 (01 Feb 2018 20:35)  HR: 69 (02 Feb 2018 07:48) (67 - 76)  BP: 119/55 (02 Feb 2018 07:48) (119/55 - 131/66)  BP(mean): --  RR: 17 (02 Feb 2018 07:48) (17 - 18)  SpO2: 97% (02 Feb 2018 07:48) (97% - 99%)  I&O's Summary      PHYSICAL EXAM:  General: No acute distress  HEENT: EOMI, PERRL  Neck: Supple, No JVD  Lungs: Clear to auscultation bilaterally; No rales or wheezing  Heart: Regular rate and rhythm; No murmurs, rubs, or gallops  Abdomen: Nontender, bowel sounds present  Extremities: No clubbing, cyanosis, or edema  Nervous system:  Alert & Oriented X3, no focal deficits  Psychiatric: Normal affect  Skin: No rashes or lesions      LABS:  02-02    146<H>  |  112<H>  |  17  ----------------------------<  100<H>  4.0   |  28  |  1.41<H>    Ca    9.6      02 Feb 2018 06:03  Phos  3.0     02-02  Mg     2.2     02-02    TPro  7.2  /  Alb  3.4<L>  /  TBili  0.3  /  DBili  x   /  AST  24  /  ALT  20  /  AlkPhos  94  02-01    Creatinine Trend: 1.41<--, 1.58<--                        12.1   8.9   )-----------( 272      ( 02 Feb 2018 06:03 )             40.4         Lipid Panel: Cholesterol, Serum 135  Direct LDL 63  HDL Cholesterol, Serum 43  Triglycerides, Serum 147    Cardiac Enzymes: CARDIAC MARKERS ( 02 Feb 2018 06:03 )  <0.015 ng/mL / x     / 128 U/L / x     / 2.7 ng/mL  CARDIAC MARKERS ( 02 Feb 2018 00:33 )  <0.015 ng/mL / x     / 158 U/L / x     / 3.6 ng/mL  CARDIAC MARKERS ( 01 Feb 2018 17:06 )  <0.015 ng/mL / x     / 214 U/L / x     / 4.6 ng/mL      Serum Pro-Brain Natriuretic Peptide: 159 pg/mL (02-01-18 @ 17:06)    RADIOLOGY: < from: Xray Chest 2 Views PA/Lat (02.01.18 @ 17:14) >  Impression: No radiographic evidence for acute cardiopulmonary disease.    < end of copied text >      ECG [my interpretation]:    TELEMETRY:     ECHO:    STRESS TEST: < from: Nuclear Stress Test-Exercise (06.07.16 @ 13:19) >  * The left ventricle was normal in size. Normal myocardial  perfusion scan, with no evidence of infarction or  inducible ischemia.  * Post-stress resting myocardial perfusion gated SPECT  imaging was performed (LVEF = 60 %;LVEDV = 64 ml.),  revealing normal left ventricular function.    < end of copied text >      CATHETERIZATION: CHIEF COMPLAINT: Chest pain    HPI: 73 yo M with CAD s/p stenting (2013), HTN, HLD who presented with chest pain. Patient reports he developed chest pain on Thursday evening at 0PM, as he was getting ready to go to sleep. It was pinching/squeezing in nature, located on the left side of his chest, without radiation, Not associated with  dyspnea, palpitations, LH, or other symptoms. Pain was intermittent on/off, prompting him to go to the ED. Patient reports this is not his anginal equivalent; he had dyspnea in 2013 when he had his stent. Additionally, patient relates he had recent episode of URI with frequent non-productive cough. Patient is unsure if the chest pain was worsened by the cough.     Patient is currently symptom free.       PAST MEDICAL & SURGICAL HISTORY:  As above, also Borderline diabetes, Obesity (BMI 30-39.9), Hypothyroid, S/P hernia repair: left inguinal, History of kidney stones,  H/O total knee replacement, left      Allergies    No Known Allergies    MEDICATIONS  (STANDING):  aspirin enteric coated 81 milliGRAM(s) Oral daily  atorvastatin 40 milliGRAM(s) Oral at bedtime  levothyroxine 112 MICROGram(s) Oral daily  metoprolol     tartrate 25 milliGRAM(s) Oral two times a day  pantoprazole    Tablet 40 milliGRAM(s) Oral before breakfast    MEDICATIONS  (PRN):      FAMILY HISTORY:  Family history of hypertension: mother  Family history of diabetes mellitus (Sibling): sister and bothers    SOCIAL HISTORY:  Smoking- Former 30 pack-year smoker, quit 20 years ago  Alcohol- shot of whiskey daily  Ilicit Drug use-denies    REVIEW OF SYSTEMS:  Constitutional: [ ] fever, [ ]weight loss,  [ ]fatigue  Eyes: [ ] visual changes  Respiratory: [ ]shortness of breath;  [ ] cough, [ ]wheezing, [ ]chills, [ ]hemoptysis  Cardiovascular: [ ] chest pain, [ ]palpitations, [ ]dizziness,  [ ]leg swelling  Gastrointestinal: [ ] abdominal pain, [ ]nausea, [ ]vomiting,  [ ]diarrhea   Genitourinary: [ ] dysuria, [ ] hematuria  Neurologic: [ ] headaches [ ] tremors  [ ] weakness [ ] lightheadedness  Skin: [ ] itching, [ ]burning, [ ] rashes  Endocrine: [ ] heat or cold intolerance  Musculoskeletal: [ ] joint pain or swelling; [ ] muscle, back, or extremity pain  Psychiatric: [ ] depression, [ ]anxiety, [ ]mood swings, or [ ]difficulty sleeping  Hematologic: [ ] easy bruising, [ ] bleeding gums       [ x] All others negative	  [ ] Unable to obtain    Vital Signs Last 24 Hrs  T(C): 36.7 (02 Feb 2018 07:48), Max: 36.9 (01 Feb 2018 20:35)  T(F): 98 (02 Feb 2018 07:48), Max: 98.4 (01 Feb 2018 20:35)  HR: 69 (02 Feb 2018 07:48) (67 - 76)  BP: 119/55 (02 Feb 2018 07:48) (119/55 - 131/66)  BP(mean): --  RR: 17 (02 Feb 2018 07:48) (17 - 18)  SpO2: 97% (02 Feb 2018 07:48) (97% - 99%)  I&O's Summary      PHYSICAL EXAM:  General: No acute distress  HEENT: EOMI, PERRL  Neck: Supple, No JVD  Lungs: Clear to auscultation bilaterally; No rales or wheezing  Heart: Regular rate and rhythm; No murmurs, rubs, or gallops  Abdomen: Nontender, bowel sounds present  Extremities: No clubbing, cyanosis, or edema  Nervous system:  Alert & Oriented X3, no focal deficits  Psychiatric: Normal affect  Skin: No rashes or lesions      LABS:  02-02    146<H>  |  112<H>  |  17  ----------------------------<  100<H>  4.0   |  28  |  1.41<H>    Ca    9.6      02 Feb 2018 06:03  Phos  3.0     02-02  Mg     2.2     02-02    TPro  7.2  /  Alb  3.4<L>  /  TBili  0.3  /  DBili  x   /  AST  24  /  ALT  20  /  AlkPhos  94  02-01    Creatinine Trend: 1.41<--, 1.58<--                        12.1   8.9   )-----------( 272      ( 02 Feb 2018 06:03 )             40.4         Lipid Panel: Cholesterol, Serum 135  Direct LDL 63  HDL Cholesterol, Serum 43  Triglycerides, Serum 147    Cardiac Enzymes: CARDIAC MARKERS ( 02 Feb 2018 06:03 )  <0.015 ng/mL / x     / 128 U/L / x     / 2.7 ng/mL  CARDIAC MARKERS ( 02 Feb 2018 00:33 )  <0.015 ng/mL / x     / 158 U/L / x     / 3.6 ng/mL  CARDIAC MARKERS ( 01 Feb 2018 17:06 )  <0.015 ng/mL / x     / 214 U/L / x     / 4.6 ng/mL      Serum Pro-Brain Natriuretic Peptide: 159 pg/mL (02-01-18 @ 17:06)    RADIOLOGY: < from: Xray Chest 2 Views PA/Lat (02.01.18 @ 17:14) >  Impression: No radiographic evidence for acute cardiopulmonary disease.    < end of copied text >      ECG [my interpretation]: 2/1/2016 @ 15:55: Normal sinus rhythm, normal axis, normal EKG    TELEMETRY: Sinus rhythm, no events      STRESS TEST: < from: Nuclear Stress Test-Exercise (06.07.16 @ 13:19) >  * The left ventricle was normal in size. Normal myocardial  perfusion scan, with no evidence of infarction or  inducible ischemia.  * Post-stress resting myocardial perfusion gated SPECT  imaging was performed (LVEF = 60 %;LVEDV = 64 ml.),  revealing normal left ventricular function.    < end of copied text >

## 2018-02-02 NOTE — DISCHARGE NOTE ADULT - HOSPITAL COURSE
71 y/o male with PMH of CAD s/p 1 stent , pre Diabetes, HLD (controlled with medication), HTN (controlled with medication), Hypothyroidism, Kidney Stones, and Obesity  presents to ED c/o intermittent chest pain x 3 days.  Described CP as pressure-like, and of sudden onset with slight radiation to the left.  Reported dry cough (non-productive) for about a week.   Denied fever, chills, vomiting, b/l leg swelling, SOB, dizziness, lightheadedness, or any other complaints.     Admitted to medicine to Rule Out ACS  Chest Pain  Heart score= 3  EKG with no ischemic changes   Monitored on Telemetry  Troponin remained negative  Continued Aspirin, Atorvastatin, & Metoprolol  Cardiologist- Dr. Fonseca consulted  Outpatient Cardiologist Dr. Cardenas had negative stress test 2 years ago     Hyperlipidemia  Continued Atorvastatin    CAD (Coronary Artery Disease)  Continued Aspirin, Atorvastatin, & Metoprolol    Hypothyroid  TSH c/w hypothyroidism  Continued Synthroid     Hypertension  Blood pressure stable  Continued Metoprolol   Amlodipine was held due to labile blood pressure    Need for Prophylactic Measure  Improve=1 an warranted no DVT prophylaxis

## 2018-02-02 NOTE — DISCHARGE NOTE ADULT - CARE PLAN
Principal Discharge DX:	Chest pain  Goal:	Resolved  Assessment and plan of treatment:	You have an appointment to see your Cardiologist-Dr. Cardenas on Feb. 10th @ 8:20AM.  Please follow up w/ your Cardiologist  Secondary Diagnosis:	Hypertension  Goal:	Maintain normal blood pressure  Assessment and plan of treatment:	Adhere to low salt diet  Your blood pressure was low and your Amlodipine was not given to you.  Please discuss w/ Dr. Cardenas.  Please monitor your blood pressure at home daily.  Secondary Diagnosis:	Hyperlipidemia

## 2018-02-02 NOTE — DISCHARGE NOTE ADULT - CARE PROVIDER_API CALL
Austyn Cardenas), Cardiovascular Disease; Interventional Cardiology  [address] [place] [address]  Phone: [phone]  Fax: [phone]

## 2018-02-02 NOTE — DISCHARGE NOTE ADULT - PLAN OF CARE
Resolved You have an appointment to see your Cardiologist-Dr. Cardenas on Feb. 10th @ 8:20AM.  Please follow up w/ your Cardiologist Maintain normal blood pressure Adhere to low salt diet  Your blood pressure was low and your Amlodipine was not given to you.  Please discuss w/ Dr. Cardenas.  Please monitor your blood pressure at home daily.

## 2018-02-02 NOTE — DISCHARGE NOTE ADULT - PATIENT PORTAL LINK FT
“You can access the [employer] Patient Portal, offered by [facility], by registering with the following website: [email or web address]”

## 2018-02-10 ENCOUNTER — NON-APPOINTMENT (OUTPATIENT)
Age: 73
End: 2018-02-10

## 2018-02-10 ENCOUNTER — APPOINTMENT (OUTPATIENT)
Dept: CARDIOLOGY | Facility: CLINIC | Age: 73
End: 2018-02-10
Payer: MEDICARE

## 2018-02-10 VITALS
DIASTOLIC BLOOD PRESSURE: 71 MMHG | HEIGHT: 64 IN | SYSTOLIC BLOOD PRESSURE: 113 MMHG | WEIGHT: 180 LBS | OXYGEN SATURATION: 97 % | HEART RATE: 66 BPM | BODY MASS INDEX: 30.73 KG/M2

## 2018-02-10 PROCEDURE — 93000 ELECTROCARDIOGRAM COMPLETE: CPT

## 2018-02-10 PROCEDURE — 99214 OFFICE O/P EST MOD 30 MIN: CPT

## 2018-03-28 ENCOUNTER — OUTPATIENT (OUTPATIENT)
Dept: OUTPATIENT SERVICES | Facility: HOSPITAL | Age: 73
LOS: 1 days | End: 2018-03-28
Payer: COMMERCIAL

## 2018-03-28 ENCOUNTER — APPOINTMENT (OUTPATIENT)
Dept: CARDIOLOGY | Facility: CLINIC | Age: 73
End: 2018-03-28

## 2018-03-28 DIAGNOSIS — Z00.8 ENCOUNTER FOR OTHER GENERAL EXAMINATION: ICD-10-CM

## 2018-03-28 DIAGNOSIS — Z96.652 PRESENCE OF LEFT ARTIFICIAL KNEE JOINT: Chronic | ICD-10-CM

## 2018-03-28 DIAGNOSIS — H26.40 UNSPECIFIED SECONDARY CATARACT: Chronic | ICD-10-CM

## 2018-03-28 DIAGNOSIS — Z98.89 OTHER SPECIFIED POSTPROCEDURAL STATES: Chronic | ICD-10-CM

## 2018-03-28 DIAGNOSIS — Z87.442 PERSONAL HISTORY OF URINARY CALCULI: Chronic | ICD-10-CM

## 2018-03-28 DIAGNOSIS — Z95.5 PRESENCE OF CORONARY ANGIOPLASTY IMPLANT AND GRAFT: Chronic | ICD-10-CM

## 2018-03-28 PROCEDURE — 93017 CV STRESS TEST TRACING ONLY: CPT

## 2018-03-28 PROCEDURE — 93016 CV STRESS TEST SUPVJ ONLY: CPT

## 2018-03-28 PROCEDURE — 93018 CV STRESS TEST I&R ONLY: CPT

## 2018-03-28 PROCEDURE — 78452 HT MUSCLE IMAGE SPECT MULT: CPT | Mod: 26

## 2018-03-28 PROCEDURE — 78452 HT MUSCLE IMAGE SPECT MULT: CPT

## 2018-03-28 PROCEDURE — A9500: CPT

## 2018-03-30 ENCOUNTER — TRANSCRIPTION ENCOUNTER (OUTPATIENT)
Age: 73
End: 2018-03-30

## 2018-04-17 ENCOUNTER — APPOINTMENT (OUTPATIENT)
Dept: GASTROENTEROLOGY | Facility: CLINIC | Age: 73
End: 2018-04-17

## 2018-04-20 ENCOUNTER — APPOINTMENT (OUTPATIENT)
Dept: INTERNAL MEDICINE | Facility: CLINIC | Age: 73
End: 2018-04-20
Payer: MEDICARE

## 2018-04-20 VITALS
RESPIRATION RATE: 18 BRPM | HEIGHT: 64 IN | OXYGEN SATURATION: 98 % | BODY MASS INDEX: 32.27 KG/M2 | DIASTOLIC BLOOD PRESSURE: 80 MMHG | SYSTOLIC BLOOD PRESSURE: 120 MMHG | HEART RATE: 79 BPM | TEMPERATURE: 98.3 F | WEIGHT: 189 LBS

## 2018-04-20 PROCEDURE — 99214 OFFICE O/P EST MOD 30 MIN: CPT

## 2018-05-18 ENCOUNTER — APPOINTMENT (OUTPATIENT)
Dept: UROLOGY | Facility: CLINIC | Age: 73
End: 2018-05-18
Payer: MEDICARE

## 2018-05-18 VITALS
RESPIRATION RATE: 18 BRPM | HEIGHT: 64 IN | DIASTOLIC BLOOD PRESSURE: 86 MMHG | SYSTOLIC BLOOD PRESSURE: 124 MMHG | HEART RATE: 66 BPM | BODY MASS INDEX: 30.73 KG/M2 | WEIGHT: 180 LBS

## 2018-05-18 PROCEDURE — 51798 US URINE CAPACITY MEASURE: CPT

## 2018-05-18 PROCEDURE — 99204 OFFICE O/P NEW MOD 45 MIN: CPT | Mod: 25

## 2018-06-19 ENCOUNTER — APPOINTMENT (OUTPATIENT)
Dept: UROLOGY | Facility: CLINIC | Age: 73
End: 2018-06-19

## 2018-07-12 ENCOUNTER — RX RENEWAL (OUTPATIENT)
Age: 73
End: 2018-07-12

## 2018-07-19 ENCOUNTER — APPOINTMENT (OUTPATIENT)
Dept: INTERNAL MEDICINE | Facility: CLINIC | Age: 73
End: 2018-07-19
Payer: MEDICARE

## 2018-07-19 VITALS
OXYGEN SATURATION: 96 % | SYSTOLIC BLOOD PRESSURE: 120 MMHG | HEIGHT: 64 IN | RESPIRATION RATE: 17 BRPM | HEART RATE: 66 BPM | BODY MASS INDEX: 32.1 KG/M2 | DIASTOLIC BLOOD PRESSURE: 80 MMHG | TEMPERATURE: 98.1 F | WEIGHT: 188 LBS

## 2018-07-19 PROBLEM — R73.03 PREDIABETES: Chronic | Status: ACTIVE | Noted: 2018-02-01

## 2018-07-19 PROCEDURE — 99214 OFFICE O/P EST MOD 30 MIN: CPT

## 2018-07-19 NOTE — HISTORY OF PRESENT ILLNESS
[Weight Gain ___ Pounds] : Weight gain of [unfilled] pounds [___ # of attempts] : [unfilled] weight lost attempts [Following Diet Successfully] : Following the diet successfully [___ times per week] : Patient exercises [unfilled] times per week [Following  treatment as advised] : Patient is following  treatment as advised [Action] : Action: patient is following a plan to lose weight [Good understanding] : Patient has a good understanding of disease, goals and obesity follow-up plan [de-identified] : 72 year old  male patient with history of stable Essential Hypertension, CAD, Chronic Renal Failure stage -2, Hypercholesterolemia with Hypertriglyceridemia, Acquired Hypothyroidism, history as stated, presented for follow up examination of Elevated BP checked. Patient is compliant with all medications. Denies shortness of breath, chest pain or abdominal pains at this time. ROS as stated.\par

## 2018-07-19 NOTE — ASSESSMENT
[FreeTextEntry1] : 72 year old male found to have stable Essential Hypertension, CAD, Chronic Renal Failure stage -2, Hypercholesterolemia with Hypertriglyceridemia, Acquired Hypothyroidism,with the current regimen, diet and life style modifications, as counseled. Prior results reviewed and discussed with the patient during today's examination. Plan as ordered.\par

## 2018-07-19 NOTE — HEALTH RISK ASSESSMENT
[No falls in past year] : Patient reported no falls in the past year [0] : 2) Feeling down, depressed, or hopeless: Not at all (0) [] : No [de-identified] : URO [[id number]] : 0

## 2018-07-20 LAB
25(OH)D3 SERPL-MCNC: 27.7 NG/ML
ALBUMIN SERPL ELPH-MCNC: 4.3 G/DL
ALP BLD-CCNC: 90 U/L
ALT SERPL-CCNC: 13 U/L
ANION GAP SERPL CALC-SCNC: 13 MMOL/L
APPEARANCE: CLEAR
AST SERPL-CCNC: 13 U/L
BASOPHILS # BLD AUTO: 0.06 K/UL
BASOPHILS NFR BLD AUTO: 0.8 %
BILIRUB SERPL-MCNC: 0.8 MG/DL
BILIRUBIN URINE: NEGATIVE
BLOOD URINE: NEGATIVE
BUN SERPL-MCNC: 17 MG/DL
CALCIUM SERPL-MCNC: 9.7 MG/DL
CHLORIDE SERPL-SCNC: 104 MMOL/L
CHOLEST SERPL-MCNC: 148 MG/DL
CHOLEST/HDLC SERPL: 2.9 RATIO
CO2 SERPL-SCNC: 25 MMOL/L
COLOR: YELLOW
CREAT SERPL-MCNC: 1.51 MG/DL
CREAT SPEC-SCNC: 158 MG/DL
EOSINOPHIL # BLD AUTO: 0.16 K/UL
EOSINOPHIL NFR BLD AUTO: 2 %
ERYTHROCYTE [SEDIMENTATION RATE] IN BLOOD BY WESTERGREN METHOD: 17 MM/HR
FOLATE SERPL-MCNC: 11.5 NG/ML
GGT SERPL-CCNC: 19 U/L
GLUCOSE QUALITATIVE U: NEGATIVE MG/DL
GLUCOSE SERPL-MCNC: 104 MG/DL
HBA1C MFR BLD HPLC: 6 %
HCT VFR BLD CALC: 38.3 %
HDLC SERPL-MCNC: 51 MG/DL
HGB BLD-MCNC: 11.7 G/DL
IMM GRANULOCYTES NFR BLD AUTO: 0.1 %
IRON SATN MFR SERPL: 13 %
IRON SERPL-MCNC: 59 UG/DL
KETONES URINE: NEGATIVE
LDLC SERPL CALC-MCNC: 67 MG/DL
LEUKOCYTE ESTERASE URINE: NEGATIVE
LYMPHOCYTES # BLD AUTO: 1.69 K/UL
LYMPHOCYTES NFR BLD AUTO: 21.3 %
MAN DIFF?: NORMAL
MCHC RBC-ENTMCNC: 26.3 PG
MCHC RBC-ENTMCNC: 30.5 GM/DL
MCV RBC AUTO: 86.1 FL
MICROALBUMIN 24H UR DL<=1MG/L-MCNC: <1.2 MG/DL
MICROALBUMIN/CREAT 24H UR-RTO: NORMAL
MONOCYTES # BLD AUTO: 0.81 K/UL
MONOCYTES NFR BLD AUTO: 10.2 %
NEUTROPHILS # BLD AUTO: 5.19 K/UL
NEUTROPHILS NFR BLD AUTO: 65.6 %
NITRITE URINE: NEGATIVE
PH URINE: 6
PLATELET # BLD AUTO: 278 K/UL
POTASSIUM SERPL-SCNC: 5 MMOL/L
PROT SERPL-MCNC: 6.8 G/DL
PROTEIN URINE: NEGATIVE MG/DL
PSA FREE FLD-MCNC: 53.6
PSA FREE SERPL-MCNC: 0.3 NG/ML
PSA SERPL-MCNC: 0.56 NG/ML
RBC # BLD: 4.45 M/UL
RBC # FLD: 14.7 %
SODIUM SERPL-SCNC: 142 MMOL/L
SPECIFIC GRAVITY URINE: 1.02
T3 SERPL-MCNC: 88 NG/DL
T4 FREE SERPL-MCNC: 1.8 NG/DL
TIBC SERPL-MCNC: 448 UG/DL
TRIGL SERPL-MCNC: 152 MG/DL
TSH SERPL-ACNC: 8.06 UIU/ML
UIBC SERPL-MCNC: 389 UG/DL
UROBILINOGEN URINE: NEGATIVE MG/DL
VIT B12 SERPL-MCNC: 156 PG/ML
WBC # FLD AUTO: 7.92 K/UL

## 2018-09-04 ENCOUNTER — RX RENEWAL (OUTPATIENT)
Age: 73
End: 2018-09-04

## 2018-09-04 ENCOUNTER — TRANSCRIPTION ENCOUNTER (OUTPATIENT)
Age: 73
End: 2018-09-04

## 2018-10-18 ENCOUNTER — APPOINTMENT (OUTPATIENT)
Dept: INTERNAL MEDICINE | Facility: CLINIC | Age: 73
End: 2018-10-18

## 2018-11-16 ENCOUNTER — APPOINTMENT (OUTPATIENT)
Dept: INTERNAL MEDICINE | Facility: CLINIC | Age: 73
End: 2018-11-16

## 2018-12-04 ENCOUNTER — APPOINTMENT (OUTPATIENT)
Dept: GASTROENTEROLOGY | Facility: CLINIC | Age: 73
End: 2018-12-04
Payer: MEDICARE

## 2018-12-04 VITALS
OXYGEN SATURATION: 97 % | WEIGHT: 180 LBS | RESPIRATION RATE: 16 BRPM | BODY MASS INDEX: 30.73 KG/M2 | HEART RATE: 76 BPM | DIASTOLIC BLOOD PRESSURE: 71 MMHG | TEMPERATURE: 97.7 F | SYSTOLIC BLOOD PRESSURE: 105 MMHG | HEIGHT: 64 IN

## 2018-12-04 PROCEDURE — 99214 OFFICE O/P EST MOD 30 MIN: CPT

## 2019-01-08 ENCOUNTER — APPOINTMENT (OUTPATIENT)
Dept: GASTROENTEROLOGY | Facility: CLINIC | Age: 74
End: 2019-01-08

## 2019-01-29 ENCOUNTER — APPOINTMENT (OUTPATIENT)
Dept: INTERNAL MEDICINE | Facility: CLINIC | Age: 74
End: 2019-01-29
Payer: MEDICARE

## 2019-01-29 VITALS
HEIGHT: 64 IN | OXYGEN SATURATION: 94 % | DIASTOLIC BLOOD PRESSURE: 80 MMHG | BODY MASS INDEX: 32.1 KG/M2 | WEIGHT: 188 LBS | SYSTOLIC BLOOD PRESSURE: 120 MMHG | RESPIRATION RATE: 16 BRPM | TEMPERATURE: 98 F | HEART RATE: 90 BPM

## 2019-01-29 DIAGNOSIS — M54.12 RADICULOPATHY, CERVICAL REGION: ICD-10-CM

## 2019-01-29 PROCEDURE — 99214 OFFICE O/P EST MOD 30 MIN: CPT

## 2019-01-29 RX ORDER — TAMSULOSIN HYDROCHLORIDE 0.4 MG/1
0.4 CAPSULE ORAL
Qty: 30 | Refills: 0 | Status: DISCONTINUED | COMMUNITY
Start: 2018-05-18 | End: 2019-01-29

## 2019-01-29 NOTE — HISTORY OF PRESENT ILLNESS
[de-identified] : 73 year old  male patient with history of stable Essential Hypertension, CAD, Acquired Hypothyroidism,  Iron Deficiency Anemia, Hypercholesterolemia with Hypertriglyceridemia, Chronic Renal Failure stage -2, Elevated Hemoglobin A1c, GERD, history as stated, presented for follow up examination of Elevated BP checked. Patient is compliant with all medications. Denies shortness of breath, chest pain or abdominal pains at this time. ROS as stated.\par [place], c/o headache and dizziness, no n/v or visual sx at this time.

## 2019-01-29 NOTE — ASSESSMENT
[FreeTextEntry1] : 73 year old male found to have stable Essential Hypertension, CAD, Acquired Hypothyroidism,  Iron Deficiency Anemia, Hypercholesterolemia with Hypertriglyceridemia, Chronic Renal Failure stage -2, Elevated Hemoglobin A1c, GERD,with the current regimen, diet and life style modifications, as counseled. Prior results reviewed and discussed with the patient during today's examination. Plan as ordered.\par Current symptoms are consistent with possible Cerebral concussion, R/O SDH, CT BRain without contrast as ordered. Consider NEURO f/u, if Sx persists, as counseled.

## 2019-01-30 LAB
ALBUMIN SERPL ELPH-MCNC: 3.8 G/DL
ALP BLD-CCNC: 89 U/L
ALT SERPL-CCNC: 10 U/L
ANION GAP SERPL CALC-SCNC: 14 MMOL/L
AST SERPL-CCNC: 17 U/L
BASOPHILS # BLD AUTO: 0.05 K/UL
BASOPHILS NFR BLD AUTO: 0.8 %
BILIRUB SERPL-MCNC: 0.5 MG/DL
BUN SERPL-MCNC: 10 MG/DL
CALCIUM SERPL-MCNC: 9.5 MG/DL
CHLORIDE SERPL-SCNC: 106 MMOL/L
CHOLEST SERPL-MCNC: 141 MG/DL
CHOLEST/HDLC SERPL: 3.1 RATIO
CO2 SERPL-SCNC: 24 MMOL/L
CREAT SERPL-MCNC: 1.55 MG/DL
EOSINOPHIL # BLD AUTO: 0.22 K/UL
EOSINOPHIL NFR BLD AUTO: 3.3 %
GGT SERPL-CCNC: 20 U/L
GLUCOSE SERPL-MCNC: 126 MG/DL
HBA1C MFR BLD HPLC: 6 %
HCT VFR BLD CALC: 40.3 %
HDLC SERPL-MCNC: 45 MG/DL
HGB BLD-MCNC: 12.1 G/DL
IMM GRANULOCYTES NFR BLD AUTO: 0.3 %
LDLC SERPL CALC-MCNC: 72 MG/DL
LYMPHOCYTES # BLD AUTO: 1.4 K/UL
LYMPHOCYTES NFR BLD AUTO: 21.1 %
MAN DIFF?: NORMAL
MCHC RBC-ENTMCNC: 26.8 PG
MCHC RBC-ENTMCNC: 30 GM/DL
MCV RBC AUTO: 89.4 FL
MONOCYTES # BLD AUTO: 0.57 K/UL
MONOCYTES NFR BLD AUTO: 8.6 %
NEUTROPHILS # BLD AUTO: 4.37 K/UL
NEUTROPHILS NFR BLD AUTO: 65.9 %
PLATELET # BLD AUTO: 260 K/UL
POTASSIUM SERPL-SCNC: 4.7 MMOL/L
PROT SERPL-MCNC: 6.6 G/DL
RBC # BLD: 4.51 M/UL
RBC # FLD: 14.9 %
SODIUM SERPL-SCNC: 144 MMOL/L
T3 SERPL-MCNC: 79 NG/DL
T4 FREE SERPL-MCNC: 1.1 NG/DL
TRIGL SERPL-MCNC: 118 MG/DL
TSH SERPL-ACNC: 31.73 UIU/ML
WBC # FLD AUTO: 6.63 K/UL

## 2019-02-04 ENCOUNTER — FORM ENCOUNTER (OUTPATIENT)
Age: 74
End: 2019-02-04

## 2019-02-05 ENCOUNTER — OUTPATIENT (OUTPATIENT)
Dept: OUTPATIENT SERVICES | Facility: HOSPITAL | Age: 74
LOS: 1 days | End: 2019-02-05
Payer: MEDICARE

## 2019-02-05 ENCOUNTER — APPOINTMENT (OUTPATIENT)
Dept: CT IMAGING | Facility: HOSPITAL | Age: 74
End: 2019-02-05
Payer: MEDICARE

## 2019-02-05 ENCOUNTER — TRANSCRIPTION ENCOUNTER (OUTPATIENT)
Age: 74
End: 2019-02-05

## 2019-02-05 ENCOUNTER — CLINICAL ADVICE (OUTPATIENT)
Age: 74
End: 2019-02-05

## 2019-02-05 DIAGNOSIS — Z98.89 OTHER SPECIFIED POSTPROCEDURAL STATES: Chronic | ICD-10-CM

## 2019-02-05 DIAGNOSIS — H26.40 UNSPECIFIED SECONDARY CATARACT: Chronic | ICD-10-CM

## 2019-02-05 DIAGNOSIS — Z87.442 PERSONAL HISTORY OF URINARY CALCULI: Chronic | ICD-10-CM

## 2019-02-05 DIAGNOSIS — Z95.5 PRESENCE OF CORONARY ANGIOPLASTY IMPLANT AND GRAFT: Chronic | ICD-10-CM

## 2019-02-05 DIAGNOSIS — S06.0X9A CONCUSSION WITH LOSS OF CONSCIOUSNESS OF UNSPECIFIED DURATION, INITIAL ENCOUNTER: ICD-10-CM

## 2019-02-05 DIAGNOSIS — Z96.652 PRESENCE OF LEFT ARTIFICIAL KNEE JOINT: Chronic | ICD-10-CM

## 2019-02-05 PROCEDURE — 72052 X-RAY EXAM NECK SPINE 6/>VWS: CPT | Mod: 26

## 2019-02-05 PROCEDURE — 72052 X-RAY EXAM NECK SPINE 6/>VWS: CPT

## 2019-02-05 PROCEDURE — 70450 CT HEAD/BRAIN W/O DYE: CPT | Mod: 26

## 2019-02-05 PROCEDURE — 70450 CT HEAD/BRAIN W/O DYE: CPT

## 2019-02-06 ENCOUNTER — TRANSCRIPTION ENCOUNTER (OUTPATIENT)
Age: 74
End: 2019-02-06

## 2019-02-07 ENCOUNTER — APPOINTMENT (OUTPATIENT)
Dept: NEUROSURGERY | Facility: CLINIC | Age: 74
End: 2019-02-07
Payer: MEDICARE

## 2019-02-07 VITALS
BODY MASS INDEX: 30.73 KG/M2 | RESPIRATION RATE: 16 BRPM | WEIGHT: 180 LBS | TEMPERATURE: 97.9 F | OXYGEN SATURATION: 96 % | HEIGHT: 64 IN | SYSTOLIC BLOOD PRESSURE: 131 MMHG | DIASTOLIC BLOOD PRESSURE: 77 MMHG | HEART RATE: 70 BPM

## 2019-02-07 DIAGNOSIS — Z83.3 FAMILY HISTORY OF DIABETES MELLITUS: ICD-10-CM

## 2019-02-07 DIAGNOSIS — Z78.9 OTHER SPECIFIED HEALTH STATUS: ICD-10-CM

## 2019-02-07 PROCEDURE — 99204 OFFICE O/P NEW MOD 45 MIN: CPT

## 2019-02-07 RX ORDER — HYDROCORTISONE 10 MG/G
1 CREAM TOPICAL
Qty: 1 | Refills: 0 | Status: DISCONTINUED | COMMUNITY
Start: 2018-12-04 | End: 2019-02-07

## 2019-02-07 RX ORDER — HYDROCORTISONE ACETATE 30 MG/1
30 SUPPOSITORY RECTAL
Qty: 12 | Refills: 0 | Status: DISCONTINUED | COMMUNITY
Start: 2018-12-04 | End: 2019-02-07

## 2019-02-07 RX ORDER — TAMSULOSIN HYDROCHLORIDE 0.4 MG/1
0.4 CAPSULE ORAL
Qty: 90 | Refills: 3 | Status: DISCONTINUED | COMMUNITY
Start: 2018-05-18 | End: 2019-02-07

## 2019-02-07 NOTE — REVIEW OF SYSTEMS
[Decr. Concentrating Ability] : decreased concentrating ability [Tension Headache] : tension-type headaches [Eyesight Problems] : eyesight problems [Loss Of Hearing] : hearing loss [Joint Pain] : joint pain [Negative] : Heme/Lymph [Confused or Disoriented] : no confusion [Memory Lapses or Loss] : no memory loss [Difficulty with Language] : no ~M difficulty with language [Numbness] : no numbness [Tingling] : no tingling [Abnormal Sensation] : no abnormal sensation [Seizures] : no convulsions [Dizziness] : no dizziness [Lightheadedness] : no lightheadedness [Vertigo] : no vertigo [Chest Pain] : no chest pain [Cough] : no cough [FreeTextEntry3] : to see retina specialist [FreeTextEntry9] : left shoulder

## 2019-02-07 NOTE — HISTORY OF PRESENT ILLNESS
[de-identified] : MARIN GARCIA is a 73 year old right-handed male (born in [place], raised in [place]) here at the request of Dr. Ewing with the chief complaint of "brain injury".  PMH of CAD, coronary stent (5 years ago), hypothyroid, HTN, hypercholesterolemia, GERD, iron deficiency anemia, and [place]. 3 or 4 weeks back, [place] the [place]. He missed a step and tumbled. He fell forward and hit the left side of his head.  He sat there for a while.  He was bruised at the left forehead.  After he stood up, he felt OK and went home.  Two weeks ago, he started having a feeling of heaviness in his head. He couldn't concentrate. He went to see Dr. Ewing due to his symptoms and was sent for a CT scan which was done on 2/5/2019.  Today, he denies dizziness, seizure (never), confusion, visual/smell/taste/hearing disturbance, gait disturbance, or imbalance.  He does have some sinus pressure at his forehead which he feels is due to a cold that he has had for about a month.  He is retired but does work part time as an .  He did not stop the baby ASA at any point after the fall.  He feels about 70-75% improved today compared to where he was at his worst after the fall.  \par \par PCP: Dr. Thad Ewing\par Card: Dr. Austyn Cardenas\par \par

## 2019-02-07 NOTE — REASON FOR VISIT
[New Patient Visit] : a new patient visit [Referred By: _________] : Patient was referred by [facility] [Spouse] : spouse [FreeTextEntry1] : Left subdural hematoma

## 2019-02-07 NOTE — ASSESSMENT
[FreeTextEntry1] : IMPRESSION:\par 1) Non-acute subdural hematoma along the left cerebral convexity with 4 mm shift.\par 2) Minimally symptomatic at present\par 3) Frontal mild headaches that he thinks are sinus related (he has a cold)\par 4) On ASA 81 mg for CAD/stent placed 5 years ago\par 5) Borderline diabetic\par \par PLAN:\par 1) SDH explained to pt. and wife.  If large and/or symptomatic may require surgery.  If minimally symptomatic or symptoms improving, watch and monitor.  He seems to be improving and reports being 70-75% better. \par 2)  Will watch and monitor. Will get MRI brain now (requests to be done at [facility]) to evaluate and will repeat in 4-6 weeks.  Will call with results.\par 3) Discussed case with Dr. Mesfin Claire to see if meets inclusion criteria for tranexamic acid trial. Not a candidate due to coronary stent.\par 4) Would stop ASA until SDH resolved.  Discussed with Dr. Cardenas (Cardiologist) who agrees.\par 5) Discussed case with Mr. Jasso's daughter who is a physician over the phone.\par 6) He should  avoid falls or situations which would put him at risk for fall and head injury.\par \par Dm Patel MD, FACS, FAANS\par Professor and \par Department of Neurosurgery\par [facility]\par [address], [address]\[place] [address]\par [phone] Clinical\par [phone] Academic\par

## 2019-02-07 NOTE — PHYSICAL EXAM
[General Appearance - Alert] : alert [General Appearance - In No Acute Distress] : in no acute distress [General Appearance - Well Nourished] : well nourished [General Appearance - Well Developed] : well developed [General Appearance - Well-Appearing] : healthy appearing [Oriented To Time, Place, And Person] : oriented to person, place, and time [Impaired Insight] : insight and judgment were intact [Affect] : the affect was normal [Mood] : the mood was normal [Memory Recent] : recent memory was not impaired [Memory Remote] : remote memory was not impaired [Person] : oriented to person [Place] : oriented to place [Time] : oriented to time [Short Term Intact] : short term memory intact [Remote Intact] : remote memory intact [Concentration Intact] : normal concentrating ability [Fluency] : fluency intact [Comprehension] : comprehension intact [Current Events] : adequate knowledge of current events [Past History] : adequate knowledge of personal past history [Vocabulary] : adequate range of vocabulary [Cranial Nerves Optic (II)] : visual acuity intact bilaterally,  pupils equal round and reactive to light [Cranial Nerves Oculomotor (III)] : extraocular motion intact [Cranial Nerves Trigeminal (V)] : facial sensation intact symmetrically [Cranial Nerves Facial (VII)] : face symmetrical [Cranial Nerves Vestibulocochlear (VIII)] : hearing was intact bilaterally [Cranial Nerves Glossopharyngeal (IX)] : tongue and palate midline [Cranial Nerves Accessory (XI - Cranial And Spinal)] : head turning and shoulder shrug symmetric [Cranial Nerves Hypoglossal (XII)] : there was no tongue deviation with protrusion [Motor Tone] : muscle tone was normal in all four extremities [Motor Strength] : muscle strength was normal in all four extremities [Involuntary Movements] : no involuntary movements were seen [No Muscle Atrophy] : normal bulk in all four extremities [Motor Handedness Right-Handed] : the patient is right hand dominant [5] : S1 toe walking 5/5 [Sensation Tactile Decrease] : light touch was intact [Balance] : balance was intact [1+] : Patella right 1+ [0] : Ankle jerk left 0 [No Visual Abnormalities] : no visible abnormailities [No Tenderness to Palpation] : no spine tenderness on palpation [Normal] : normal [Able to toe walk] : the patient was able to toe walk [Able to heel walk] : the patient was able to heel walk [Intact] : all sensory within normal limits bilaterally [Sclera] : the sclera and conjunctiva were normal [PERRL With Normal Accommodation] : pupils were equal in size, round, reactive to light, with normal accommodation [Extraocular Movements] : extraocular movements were intact [No APD] : no afferent pupillary defect [Outer Ear] : the ears and nose were normal in appearance [Examination Of The Oral Cavity] : the lips and gums were normal [Neck Appearance] : the appearance of the neck was normal [Neck Cervical Mass (___cm)] : no neck mass was observed [] : no respiratory distress [Respiration, Rhythm And Depth] : normal respiratory rhythm and effort [Exaggerated Use Of Accessory Muscles For Inspiration] : no accessory muscle use [Auscultation Breath Sounds / Voice Sounds] : lungs were clear to auscultation bilaterally [Heart Rate And Rhythm] : heart rate was normal and rhythm regular [Heart Sounds] : normal S1 and S2 [Heart Sounds Gallop] : no gallops [Murmurs] : no murmurs [Heart Sounds Pericardial Friction Rub] : no pericardial rub [Arterial Pulses Carotid] : carotid pulses were normal with no bruits [Edema] : there was no peripheral edema [Bowel Sounds] : normal bowel sounds [Abdomen Soft] : soft [Abdomen Tenderness] : non-tender [No Spinal Tenderness] : no spinal tenderness [Abnormal Walk] : normal gait [Nail Clubbing] : no clubbing  or cyanosis of the fingernails [Skin Color & Pigmentation] : normal skin color and pigmentation [Skin Turgor] : normal skin turgor [2+] : Brachioradialis left 2+ [Span Intact] : the attention span was normal [Romberg's Sign] : Romberg's sign was negtive [Limited Balance] : balance was intact [Past-pointing] : there was no past-pointing [Tremor] : no tremor present [Dysdiadochokinesia Bilaterally] : not present [Coordination - Dysmetria Impaired Finger-to-Nose Bilateral] : not present [FreeTextEntry6] : No pronator drift. Was able to do 7-8 steps tandem walk without loss of balance [FreeTextEntry1] : JASEN

## 2019-02-07 NOTE — DATA REVIEWED
[de-identified] : CT Head \par EXAM: CT BRAIN \par \par \par PROCEDURE DATE: 02/05/2019 \par \par \par INTERPRETATION: CT scan of the brain without contrast 2/5/19: Subacute left subdural hematoma with some mass effect.\par \par CLINICAL HISTORY: Dizziness with history of left side head trauma approximately 1 month ago \par \par TECHNIQUE: Multi detector spiral CT scan of the Brain was performed from the skull base to vertex without IV contrast administration in brain and bone windows. \par \par FINDINGS: There is an extra-axial fluid collection along the left cerebral convexity which is slightly increased in density compared with CSF and this is compatible with non-acute left subdural hematoma. There is shift of the left cerebral cortex against the falx cerebri and shift of midline structures to the right of approximately 4 mm. \par \par The ventricles are not compressed. The cortical sulci and gyri are normal. The medulla, haider and cerebellum are unremarkable. The bony calvaria are intact. The visualized paranasal sinuses show evidence of sinusitis in the maxillary sinus and some left-sided ethmoid air cells. \par \par There is no evidence of acute infarction. \par \par IMPRESSION: \par Non-acute left subdural hematoma with evidence of interval resorption of blood products. Mild midline shift as described. \par \par The above findings were conveyed by telephone to Dr. Ewing prior to the patient's departure from the outpatient imaging facility. As per Dr. Ewing's request, the patient was instructed to contact Dr. Ewing's office today and arrange for follow-up instructions either in person or by telephone. \par \par \par BENJI LUCAS M.D., ATTENDING RADIOLOGIST \par This document has been electronically signed. Feb 5 2019 12:39PM \par

## 2019-02-07 NOTE — CONSULT LETTER
[Dear  ___] : Dear  [unfilled], [Consult Letter:] : I had the pleasure of evaluating your patient, [unfilled]. [Please see my note below.] : Please see my note below. [Consult Closing:] : Thank you very much for allowing me to participate in the care of this patient.  If you have any questions, please do not hesitate to contact me. [Sincerely,] : Sincerely, [FreeTextEntry2] : Thad Ewing MD\par [address]\[place] [address] [FreeTextEntry3] : Dm Patel MD, FACS, FAANS\par Professor and \par Department of Neurosurgery\par [facility]\par [address] [place] [address], [address] [place] [address]\[place] [address]\par [phone] Clinical\par [phone] Academic\par  [DrAguilar  ___] : Dr. ARANGO

## 2019-02-09 ENCOUNTER — APPOINTMENT (OUTPATIENT)
Dept: CARDIOLOGY | Facility: CLINIC | Age: 74
End: 2019-02-09

## 2019-02-20 ENCOUNTER — APPOINTMENT (OUTPATIENT)
Dept: CARDIOLOGY | Facility: CLINIC | Age: 74
End: 2019-02-20
Payer: MEDICARE

## 2019-02-20 VITALS
HEART RATE: 89 BPM | SYSTOLIC BLOOD PRESSURE: 108 MMHG | TEMPERATURE: 97.7 F | HEIGHT: 64 IN | DIASTOLIC BLOOD PRESSURE: 67 MMHG | WEIGHT: 180 LBS | BODY MASS INDEX: 30.73 KG/M2 | OXYGEN SATURATION: 97 %

## 2019-02-20 PROCEDURE — 93000 ELECTROCARDIOGRAM COMPLETE: CPT

## 2019-02-20 PROCEDURE — 99215 OFFICE O/P EST HI 40 MIN: CPT

## 2019-02-20 RX ORDER — ASPIRIN 81 MG
81 TABLET, DELAYED RELEASE (ENTERIC COATED) ORAL
Refills: 0 | Status: DISCONTINUED | COMMUNITY
End: 2019-02-20

## 2019-02-20 NOTE — ASSESSMENT
[FreeTextEntry1] : 72 yo M with HTN, HLD, and CAD s/p PCI (2013) who presents for follow-up visit. Recent mechanical fall with SDH. \par \par 1. Exertional dyspnea: Given progression in symptoms over past few months and history of CAD as well as HTN, it would be reasonable to repeat a nuclear stress test as well as echocardiogram. However, in setting of SDH and restriction of aspirin, will hold off on testing until SDH resolves and patient is cleared from neurosurgical perspective, as he would require systemic anticoagulation in setting of possible cardiac intervention. \par -Patient given Rx for stress/echo and will obtain auth now, with anticipation of patient going for testing after the next 6 weeks or so. \par -Given his history of smoking, patient will also be referred to pulmonologist for further evaluation of dyspnea \par \par 2. CAD: Continue atorvastatin, off aspirin due to SDH\par \par 3. HTN: Amlodipine, metoprolol, well controlled\par \par 4. HLD: Atorvastatin\par \par

## 2019-02-20 NOTE — HISTORY OF PRESENT ILLNESS
[FreeTextEntry1] : 74 yo M with HTN, HLD, and CAD s/p PCI (2013) who presents for follow-up visit. Patient previously followed by Dr. Cardenas, switched as this office is easier for him to get to. Last stress test 03/2018, EF 64%, no ischemia. [place] the airport when he missed a step and toppled over, hitting his head. There was no syncope, no chest pain or dyspnea. However several weeks later a CT head showed a subdural hematoma. Patient was seen by neurosurgery, is getting MRI now and repeat in 4-6 weeks to see if SDH resolves; he was taken off aspirin in the interim period. \par \par Patient reports he gets more tired quickly, a change he noted over the past 6 months. This is associated with exertional dyspnea, though occasionally he reports dyspnea without exertion, less frequently. Denies any chest pain, palpitations, lightheadedness, or syncope. Denies LE edema, orthopnea, or PND. \par

## 2019-02-20 NOTE — REASON FOR VISIT
[Follow-Up - Clinic] : a clinic follow-up of [Coronary Artery Disease] : coronary artery disease [Spouse] : spouse

## 2019-02-27 ENCOUNTER — TRANSCRIPTION ENCOUNTER (OUTPATIENT)
Age: 74
End: 2019-02-27

## 2019-03-04 ENCOUNTER — APPOINTMENT (OUTPATIENT)
Dept: INTERNAL MEDICINE | Facility: CLINIC | Age: 74
End: 2019-03-04
Payer: MEDICARE

## 2019-03-04 VITALS
HEART RATE: 66 BPM | BODY MASS INDEX: 30.73 KG/M2 | SYSTOLIC BLOOD PRESSURE: 120 MMHG | DIASTOLIC BLOOD PRESSURE: 80 MMHG | TEMPERATURE: 98.2 F | HEIGHT: 64 IN | WEIGHT: 180 LBS | RESPIRATION RATE: 16 BRPM | OXYGEN SATURATION: 97 %

## 2019-03-04 PROCEDURE — 99214 OFFICE O/P EST MOD 30 MIN: CPT

## 2019-03-04 NOTE — ASSESSMENT
[FreeTextEntry1] : 73 year old male found to have stable Essential Hypertension, CAD, Chronic Renal Failure stage -2, Hypercholesterolemia with Hypertriglyceridemia, Acquired Hypothyroidism, SDH, with the current regimen, diet and life style modifications, as counseled. Prior results reviewed and discussed with the patient during today's examination. Plan as ordered.\par Await follow up MRI of Brain, as counseled.\par Patient was recently evaluated by [facility] , findings and recommendations reviewed with the patient during today's examination.\par

## 2019-03-04 NOTE — HISTORY OF PRESENT ILLNESS
[de-identified] : 73 year old  male patient with history of stable Essential Hypertension, CAD, Chronic Renal Failure stage -2, Hypercholesterolemia with Hypertriglyceridemia, Acquired Hypothyroidism, SDH, history as stated, presented for follow up examination. Patient is compliant with all medications. Denies shortness of breath, chest pain or abdominal pains at this time. ROS as stated.\par

## 2019-03-04 NOTE — HEALTH RISK ASSESSMENT
[No falls in past year] : Patient reported no falls in the past year [0] : 2) Feeling down, depressed, or hopeless: Not at all (0) [] : No [de-identified] : [facility] [[id number]] : 0

## 2019-03-16 ENCOUNTER — APPOINTMENT (OUTPATIENT)
Dept: MRI IMAGING | Facility: HOSPITAL | Age: 74
End: 2019-03-16
Payer: MEDICARE

## 2019-03-16 ENCOUNTER — OUTPATIENT (OUTPATIENT)
Dept: OUTPATIENT SERVICES | Facility: HOSPITAL | Age: 74
LOS: 1 days | End: 2019-03-16
Payer: MEDICARE

## 2019-03-16 DIAGNOSIS — Z98.89 OTHER SPECIFIED POSTPROCEDURAL STATES: Chronic | ICD-10-CM

## 2019-03-16 DIAGNOSIS — H26.40 UNSPECIFIED SECONDARY CATARACT: Chronic | ICD-10-CM

## 2019-03-16 DIAGNOSIS — Z96.652 PRESENCE OF LEFT ARTIFICIAL KNEE JOINT: Chronic | ICD-10-CM

## 2019-03-16 DIAGNOSIS — Z87.442 PERSONAL HISTORY OF URINARY CALCULI: Chronic | ICD-10-CM

## 2019-03-16 DIAGNOSIS — Z95.5 PRESENCE OF CORONARY ANGIOPLASTY IMPLANT AND GRAFT: Chronic | ICD-10-CM

## 2019-03-16 DIAGNOSIS — S06.5X9A TRAUMATIC SUBDURAL HEMORRHAGE WITH LOSS OF CONSCIOUSNESS OF UNSPECIFIED DURATION, INITIAL ENCOUNTER: ICD-10-CM

## 2019-03-16 PROCEDURE — 70551 MRI BRAIN STEM W/O DYE: CPT

## 2019-03-16 PROCEDURE — 70551 MRI BRAIN STEM W/O DYE: CPT | Mod: 26

## 2019-03-19 ENCOUNTER — TRANSCRIPTION ENCOUNTER (OUTPATIENT)
Age: 74
End: 2019-03-19

## 2019-04-12 ENCOUNTER — OUTPATIENT (OUTPATIENT)
Dept: OUTPATIENT SERVICES | Facility: HOSPITAL | Age: 74
LOS: 1 days | End: 2019-04-12
Payer: MEDICARE

## 2019-04-12 DIAGNOSIS — R06.09 OTHER FORMS OF DYSPNEA: ICD-10-CM

## 2019-04-12 DIAGNOSIS — Z96.652 PRESENCE OF LEFT ARTIFICIAL KNEE JOINT: Chronic | ICD-10-CM

## 2019-04-12 DIAGNOSIS — H26.40 UNSPECIFIED SECONDARY CATARACT: Chronic | ICD-10-CM

## 2019-04-12 DIAGNOSIS — Z98.89 OTHER SPECIFIED POSTPROCEDURAL STATES: Chronic | ICD-10-CM

## 2019-04-12 DIAGNOSIS — Z95.5 PRESENCE OF CORONARY ANGIOPLASTY IMPLANT AND GRAFT: Chronic | ICD-10-CM

## 2019-04-12 DIAGNOSIS — Z87.442 PERSONAL HISTORY OF URINARY CALCULI: Chronic | ICD-10-CM

## 2019-04-12 PROCEDURE — A9502: CPT

## 2019-04-12 PROCEDURE — 78452 HT MUSCLE IMAGE SPECT MULT: CPT

## 2019-04-12 PROCEDURE — 93016 CV STRESS TEST SUPVJ ONLY: CPT

## 2019-04-12 PROCEDURE — 93017 CV STRESS TEST TRACING ONLY: CPT

## 2019-04-12 PROCEDURE — 93306 TTE W/DOPPLER COMPLETE: CPT

## 2019-04-12 PROCEDURE — 93018 CV STRESS TEST I&R ONLY: CPT

## 2019-04-12 PROCEDURE — 78452 HT MUSCLE IMAGE SPECT MULT: CPT | Mod: 26

## 2019-04-15 ENCOUNTER — TRANSCRIPTION ENCOUNTER (OUTPATIENT)
Age: 74
End: 2019-04-15

## 2019-04-18 ENCOUNTER — TRANSCRIPTION ENCOUNTER (OUTPATIENT)
Age: 74
End: 2019-04-18

## 2019-05-10 ENCOUNTER — APPOINTMENT (OUTPATIENT)
Dept: UROLOGY | Facility: CLINIC | Age: 74
End: 2019-05-10

## 2019-05-13 ENCOUNTER — RX RENEWAL (OUTPATIENT)
Age: 74
End: 2019-05-13

## 2019-05-13 ENCOUNTER — TRANSCRIPTION ENCOUNTER (OUTPATIENT)
Age: 74
End: 2019-05-13

## 2019-05-31 ENCOUNTER — APPOINTMENT (OUTPATIENT)
Dept: ORTHOPEDIC SURGERY | Facility: CLINIC | Age: 74
End: 2019-05-31
Payer: MEDICARE

## 2019-05-31 VITALS
BODY MASS INDEX: 33.8 KG/M2 | DIASTOLIC BLOOD PRESSURE: 73 MMHG | HEIGHT: 64 IN | HEART RATE: 76 BPM | WEIGHT: 198 LBS | SYSTOLIC BLOOD PRESSURE: 116 MMHG

## 2019-05-31 PROCEDURE — 73564 X-RAY EXAM KNEE 4 OR MORE: CPT | Mod: RT

## 2019-05-31 PROCEDURE — 20611 DRAIN/INJ JOINT/BURSA W/US: CPT | Mod: RT

## 2019-05-31 PROCEDURE — 99204 OFFICE O/P NEW MOD 45 MIN: CPT | Mod: 25

## 2019-05-31 NOTE — DISCUSSION/SUMMARY
[de-identified] : Right knee osteoarthritis\par status post left total knee arthroplasty\par \par The patient and I discussed the causes and progression of degenerative joint disease of the knee. Models, diagrams and drawings were used in the discussion. Treatment can include conservative non-operative management and surgical options. Conservative management includes weight loss, activity modification, physical therapy to improve motion and strength in the muscles around the knee and the body's core, PO and topical NSAIDs, corticosteroid and/or viscosupplementation intra-articular injections. If the patient fails to improve with non-operative management, surgical management is possible. Depending upon the patient's age, BMI, activity level, degree and location of arthrosis different surgical options are possible including arthroscopic debridement with chondroplasty, high-tibial osteotomy, unicondylar/partial arthroplasty, and total joint arthroplasty.\par \par Physical therapy was prescribed for knee ROM exercises, strengthening exercises, deep tissue massage, core strengthening, hip abductor strengthening, VMO strengthening, modalities PRN, and home exercises\par \par \par The patient was prescribed Diclofenac topical liquid/creme non-steroidal anti-inflammatory medication. 1-2 pumps twice daily and apply to area with pain. There is low systemic absorption of the medication but risks while reduced remain were discussed and include but not limited to renal damage and GI ulceration and bleeding. They were warned to stop the medication if worsening buring skin or gastric pain or dizziness or other side effects. Also to immediately stop the medication and seek appropriate medical attention if any severe stomach ache, gastritis, black/red vomit, black/red stools or any other medical concern.\par \par Procedure Note:\par \par Verbal consent was obtained for an arthrocentesis and intra-articular corticosteroid injection of the right knee, after the risks and benefits were discussed with the patient. Potential adverse effects were discussed including but not limited to bleeding, skin/joint infection, local skin reactions including bleaching, bruising, stiffness, soreness, vasovagal episodes, transient hyperglycemia, avascular necrosis, pseudo-septic type reactions, post injection joint pain, allergic reaction to product or anesthetic and other rare but potential adverse effects along with benefits including decreased pain and improved stability prior to obtaining verbal informed consent. It was also discussed that for some patients the treatment is ineffective and there are no guarantees that the patient will experience improvement as the result of the injection. In rare occasions the injection can cause worsening of pain.\par \par The use of a Sonosite 15-6 MHz linear transducer with live ultrasound guidance of the knee was necessary given the patient's BMI and local body habitus overlying and obscuring the accurate identification of normal body bony anatomy used to identify the injection site and the depth of soft tissue envelope necessitating a longer than normal needle to reach the joint space, and to confirm the location of the needle tip and intra-articular delivery of the medication. Without the use of live ultrasound guidance the injection would have been more difficult and place the patient's neurovascular structures at risk from the longer needle needed to traverse the soft tissue envelope.\par \par A 6 inch bolster was placed underneath the knee to place the knee in a slightly flexed position. The superolateral injection site was identified using the ultrasound probe to identify the suprapatellar space and superior boarder of the patella first longitudinally and then transversely. The injection site was marked and prepped with a ChloraPrep swab and anesthetized with ethylchloride skin anesthesia. Using sterile technique a 20g 3 1/2 in spinal needle with 3 cc total of 1cc 1% lidocaine without epinephrine, 1cc 0.25% Marcaine without epinephrine and 1cc of 40mg/mL Kenalog was passed through the injection site towards the suprapatellar space under live ultrasound guidance and noted to penetrate the joint capsule. The medication was injected without resistance under live ultrasound visualization and noted to flow into the suprapatellar joint space. The injection site was sterilely dressed, there was minimal blood loss. The patient tolerated this procedure without any complications done by myself. Images were recorded and saved.\par \par The patient has been advised that if they notice any worsening of symptoms or any problems to contact me and seek care from a qualified medical professional. The patient was instructed to ice the knee and take NSAID medication on an as needed basis if the patient feels discomfort.\par \par The patient verifies their understanding the the visit, diagnosis and plan. They agree with the treatment plan and will contact the office with any questions or problems.\par Follow up\par PRN

## 2019-05-31 NOTE — PHYSICAL EXAM
[de-identified] : Physical Examination\par General: well nourished, in no acute distress, alert and oriented to person, place and time\par Psychiatric: normal mood and affect, no abnormal movements or speech patterns\par Eyes: vision intact + glasses\par Throat: no thyromegaly\par Lymph: no enlarged nodes, no lymphedema in extremity\par Respiratory: no wheezing, no shortness of breath with ambulation\par Cardiac: no cardiac leg swelling, 2+ peripheral pulses\par Neurology: normal gross sensation in extremities to light touch\par Abdomen: soft, non-tender, tympanic, no masses\par \par Musculoskeletal Examination\par Ambulation	+ antalgic gait, - assistive devices\par \par Knee			Right			Left\par General\par      Swelling/Deformity	normal			normal	\par      Skin			normal			healed anterior knee incision\par      Erythema		-			-\par      Standing Alignment	varus			neutral\par      Effusion		trace			none\par Range of Motion\par      Hip			full painless ROM		full painless ROM\par      Knee Flexion		110			110\par      Knee Extension	0			0\par Patella\par      J Sign		-			-\par      Quad Medial/Lateral	1/1 1/1\par      Apprehension		-			-\par      Jung's		+			\par      Grind Sign		+			\par      Crepitus		+			\par Palpation\par      Medial Joint Line	+			-\par      Medial Fem Condyle	-			-\par      Lateral Joint Line	-			-\par      Quad Tendon		-			-\par      Patella Tendon	-			-\par      Medial Patella		-			-\par      Lateral Patella 	-			-\par      Posterior Knee	+			-\par Ligamentous\par      Varus @ 0° / 30°	-/-			-/-\par      Valgus @ 0° / 30°	-/-			-/-			\par Strength Examination/Atrophy\par      Hip Flexors 		5+			5+\par      Quadriceps		5+			5+\par      Hamstring		5+			5+\par      Tibialis Anterior	5+			5+\par      Achilles/Soleus	5+			5+\par Sensation\par      Deep Peroneal	normal			normal\par      Superficial Peroneal 	normal			normal\par      Sural  		normal			normal\par      Posterior Tibial 	normal			normal\par      Saphneous 		normal			normal\par Pulses\par      DP			2+			2+\par  [de-identified] : 5 views of the affected Right knee (standing AP, flexing standing AP, 30degree flexed lateral, 0degree lateral, sunrise view)\par were ordered, obtained and evaluated by myself today and\par demonstrate:\par There is moderate to severe medial weightbearing asymmetric narrowing\par Small osteophytic lipping\par Trace suprapatellar effusion\par Small osteophytes with moderate lateral patellofemoral joint space loss without evidence of tilt [or] subluxation on sunrise view\par Normal soft tissue density\par Otherwise normal osseous bone structure without fracture or dislocation

## 2019-05-31 NOTE — HISTORY OF PRESENT ILLNESS
[de-identified] : CC Right knee\par \par HPI 74 yo male right HD presents with gradual onset of 2 months of activity related pain in the medial Right knee [without injury]. The pain is [worse], and rated a 6 out of 10, described as pain, [without radiation]. [Rest] makes the pain better and [walking standing stairs] makes the pain worse. The patient reports associated symptoms of swelling and instability. The patient - pain at night affecting sleep, and + similar pain previously contralateral knee prior to TKA.\par \par The patient has tried the following treatments:\par Activity modification	+\par Ice/Compression  	+\par Braces    		-\par Nsaids    		+\par Physical Therapy 	-\par Cortisone Injection	-\par Visco Injection		-\par Arthroscopy		- left knee TKA 10 years ago\par \par Review of Systems is positive for the above musculoskeletal symptoms and is otherwise non-contributory for general, constitutional, psychiatric, neurologic, HEENT, cardiac, respiratory, gastrointestinal, reproductive, lymphatic, and dermatologic complaints.\par \par Consult by \fran \par

## 2019-06-22 ENCOUNTER — RX RENEWAL (OUTPATIENT)
Age: 74
End: 2019-06-22

## 2019-06-27 ENCOUNTER — RX RENEWAL (OUTPATIENT)
Age: 74
End: 2019-06-27

## 2019-07-08 ENCOUNTER — APPOINTMENT (OUTPATIENT)
Dept: INTERNAL MEDICINE | Facility: CLINIC | Age: 74
End: 2019-07-08

## 2019-07-11 ENCOUNTER — TRANSCRIPTION ENCOUNTER (OUTPATIENT)
Age: 74
End: 2019-07-11

## 2019-07-23 ENCOUNTER — MEDICATION RENEWAL (OUTPATIENT)
Age: 74
End: 2019-07-23

## 2019-08-06 ENCOUNTER — APPOINTMENT (OUTPATIENT)
Dept: INTERNAL MEDICINE | Facility: CLINIC | Age: 74
End: 2019-08-06
Payer: MEDICARE

## 2019-08-06 VITALS
TEMPERATURE: 97.7 F | RESPIRATION RATE: 16 BRPM | HEIGHT: 64 IN | OXYGEN SATURATION: 98 % | BODY MASS INDEX: 33.12 KG/M2 | SYSTOLIC BLOOD PRESSURE: 120 MMHG | HEART RATE: 79 BPM | WEIGHT: 194 LBS | DIASTOLIC BLOOD PRESSURE: 80 MMHG

## 2019-08-06 PROCEDURE — 99214 OFFICE O/P EST MOD 30 MIN: CPT

## 2019-08-06 RX ORDER — AMOXICILLIN AND CLAVULANATE POTASSIUM 875; 125 MG/1; MG/1
875-125 TABLET, COATED ORAL
Qty: 20 | Refills: 0 | Status: DISCONTINUED | COMMUNITY
Start: 2019-01-04 | End: 2019-08-06

## 2019-08-06 NOTE — ASSESSMENT
[FreeTextEntry1] : 73 year old male found to have stable Essential Hypertension, CAD, Chronic Renal Failure stage -2, Hypercholesterolemia with Hypertriglyceridemia, Acquired Hypothyroidism, SDH, with the current regimen, diet and life style modifications, as counseled. Prior results reviewed and discussed with the patient during today's examination. Plan as ordered.\par

## 2019-08-06 NOTE — HISTORY OF PRESENT ILLNESS
[de-identified] : 73 year old  male patient with history of stable Essential Hypertension, CAD, Chronic Renal Failure stage -2, Hypercholesterolemia with Hypertriglyceridemia, Acquired Hypothyroidism, SDH, history as stated, presented for follow up examination. Patient is compliant with all medications. Denies shortness of breath, chest pain or abdominal pains at this time. ROS as stated.\par

## 2019-08-28 NOTE — DISCHARGE NOTE ADULT - MEDICATION SUMMARY - MEDICATIONS TO CHANGE
Anesthesia Volume In Cc: 0.5 Medical Necessity Information: It is in your best interest to select a reason for this procedure from the list below. All of these items fulfill various CMS LCD requirements except the new and changing color options. Render Note In Bullet Format When Appropriate: No Treatment Number (Will Not Render If 0): 0 Post-Care Instructions: I reviewed with the patient in detail post-care instructions. Patient is to wear sunprotection, and avoid picking at any of the treated lesions. Pt may apply Vaseline to crusted or scabbing areas. Render Post-Care Instructions In Note?: yes Detail Level: Detailed Medical Necessity Clause: This procedure was medically necessary because the lesions that were treated were: Consent: The patient's verbal consent was obtained including but not limited to risks of crusting, scabbing, blistering, scarring, darker or lighter pigmentary change, recurrence, incomplete removal and infection. I will SWITCH the dose or number of times a day I take the medications listed below when I get home from the hospital:  None

## 2019-09-06 LAB
25(OH)D3 SERPL-MCNC: 31.6 NG/ML
ALBUMIN SERPL ELPH-MCNC: 4.1 G/DL
ALP BLD-CCNC: 88 U/L
ALT SERPL-CCNC: 10 U/L
ANION GAP SERPL CALC-SCNC: 11 MMOL/L
APPEARANCE: CLEAR
AST SERPL-CCNC: 15 U/L
BASOPHILS # BLD AUTO: 0.09 K/UL
BASOPHILS NFR BLD AUTO: 1.2 %
BILIRUB SERPL-MCNC: 0.4 MG/DL
BILIRUBIN URINE: NEGATIVE
BLOOD URINE: NEGATIVE
BUN SERPL-MCNC: 14 MG/DL
CALCIUM SERPL-MCNC: 9.7 MG/DL
CHLORIDE SERPL-SCNC: 105 MMOL/L
CHOLEST SERPL-MCNC: 144 MG/DL
CHOLEST/HDLC SERPL: 3.2 RATIO
CO2 SERPL-SCNC: 25 MMOL/L
COLOR: YELLOW
CREAT SERPL-MCNC: 1.62 MG/DL
CREAT SPEC-SCNC: 140 MG/DL
EOSINOPHIL # BLD AUTO: 0.4 K/UL
EOSINOPHIL NFR BLD AUTO: 5.3 %
ERYTHROCYTE [SEDIMENTATION RATE] IN BLOOD BY WESTERGREN METHOD: 29 MM/HR
ESTIMATED AVERAGE GLUCOSE: 134 MG/DL
FOLATE SERPL-MCNC: 14.5 NG/ML
GGT SERPL-CCNC: 18 U/L
GLUCOSE QUALITATIVE U: NEGATIVE
GLUCOSE SERPL-MCNC: 113 MG/DL
HBA1C MFR BLD HPLC: 6.3 %
HCT VFR BLD CALC: 38.2 %
HDLC SERPL-MCNC: 45 MG/DL
HEMOCCULT STL QL IA: NEGATIVE
HGB BLD-MCNC: 11.2 G/DL
IMM GRANULOCYTES NFR BLD AUTO: 0.3 %
IRON SATN MFR SERPL: 7 %
IRON SERPL-MCNC: 30 UG/DL
KETONES URINE: NEGATIVE
LDLC SERPL CALC-MCNC: 79 MG/DL
LEUKOCYTE ESTERASE URINE: NEGATIVE
LYMPHOCYTES # BLD AUTO: 1.51 K/UL
LYMPHOCYTES NFR BLD AUTO: 19.9 %
MAN DIFF?: NORMAL
MCHC RBC-ENTMCNC: 25.6 PG
MCHC RBC-ENTMCNC: 29.3 GM/DL
MCV RBC AUTO: 87.4 FL
MICROALBUMIN 24H UR DL<=1MG/L-MCNC: 1.3 MG/DL
MICROALBUMIN/CREAT 24H UR-RTO: 9 MG/G
MONOCYTES # BLD AUTO: 0.81 K/UL
MONOCYTES NFR BLD AUTO: 10.7 %
NEUTROPHILS # BLD AUTO: 4.76 K/UL
NEUTROPHILS NFR BLD AUTO: 62.6 %
NITRITE URINE: NEGATIVE
PH URINE: 6
PLATELET # BLD AUTO: 256 K/UL
POTASSIUM SERPL-SCNC: 4.9 MMOL/L
PROT SERPL-MCNC: 6.5 G/DL
PROTEIN URINE: NEGATIVE
PSA FREE FLD-MCNC: 54 %
PSA FREE SERPL-MCNC: 0.25 NG/ML
PSA SERPL-MCNC: 0.46 NG/ML
RBC # BLD: 4.37 M/UL
RBC # FLD: 14.5 %
SODIUM SERPL-SCNC: 141 MMOL/L
SPECIFIC GRAVITY URINE: 1.02
T3 SERPL-MCNC: 79 NG/DL
T4 FREE SERPL-MCNC: 1.4 NG/DL
TIBC SERPL-MCNC: 409 UG/DL
TRIGL SERPL-MCNC: 101 MG/DL
TSH SERPL-ACNC: 9.52 UIU/ML
UIBC SERPL-MCNC: 379 UG/DL
UROBILINOGEN URINE: NORMAL
VIT B12 SERPL-MCNC: <150 PG/ML
WBC # FLD AUTO: 7.59 K/UL

## 2019-10-31 ENCOUNTER — INPATIENT (INPATIENT)
Facility: HOSPITAL | Age: 74
LOS: 0 days | Discharge: ANOTHER HOSPITAL | DRG: 871 | End: 2019-11-01
Attending: INTERNAL MEDICINE | Admitting: INTERNAL MEDICINE
Payer: MEDICARE

## 2019-10-31 VITALS
RESPIRATION RATE: 24 BRPM | WEIGHT: 179.9 LBS | SYSTOLIC BLOOD PRESSURE: 81 MMHG | OXYGEN SATURATION: 95 % | HEART RATE: 95 BPM | HEIGHT: 66 IN | DIASTOLIC BLOOD PRESSURE: 45 MMHG

## 2019-10-31 DIAGNOSIS — K92.2 GASTROINTESTINAL HEMORRHAGE, UNSPECIFIED: ICD-10-CM

## 2019-10-31 DIAGNOSIS — Z95.5 PRESENCE OF CORONARY ANGIOPLASTY IMPLANT AND GRAFT: Chronic | ICD-10-CM

## 2019-10-31 DIAGNOSIS — H26.40 UNSPECIFIED SECONDARY CATARACT: Chronic | ICD-10-CM

## 2019-10-31 DIAGNOSIS — Z87.442 PERSONAL HISTORY OF URINARY CALCULI: Chronic | ICD-10-CM

## 2019-10-31 DIAGNOSIS — Z96.652 PRESENCE OF LEFT ARTIFICIAL KNEE JOINT: Chronic | ICD-10-CM

## 2019-10-31 DIAGNOSIS — I95.9 HYPOTENSION, UNSPECIFIED: ICD-10-CM

## 2019-10-31 DIAGNOSIS — Z98.89 OTHER SPECIFIED POSTPROCEDURAL STATES: Chronic | ICD-10-CM

## 2019-10-31 DIAGNOSIS — N39.0 URINARY TRACT INFECTION, SITE NOT SPECIFIED: ICD-10-CM

## 2019-10-31 LAB
ALBUMIN SERPL ELPH-MCNC: 2 G/DL — LOW (ref 3.5–5)
ALP SERPL-CCNC: 258 U/L — HIGH (ref 40–120)
ALT FLD-CCNC: 17 U/L DA — SIGNIFICANT CHANGE UP (ref 10–60)
ANION GAP SERPL CALC-SCNC: 10 MMOL/L — SIGNIFICANT CHANGE UP (ref 5–17)
ANION GAP SERPL CALC-SCNC: 10 MMOL/L — SIGNIFICANT CHANGE UP (ref 5–17)
ANION GAP SERPL CALC-SCNC: 11 MMOL/L — SIGNIFICANT CHANGE UP (ref 5–17)
ANION GAP SERPL CALC-SCNC: 12 MMOL/L — SIGNIFICANT CHANGE UP (ref 5–17)
APPEARANCE UR: ABNORMAL
APTT BLD: 24.7 SEC — LOW (ref 27.5–36.3)
AST SERPL-CCNC: 18 U/L — SIGNIFICANT CHANGE UP (ref 10–40)
BASE EXCESS BLDV CALC-SCNC: -8.9 MMOL/L — LOW (ref -2–2)
BASOPHILS # BLD AUTO: 0 K/UL — SIGNIFICANT CHANGE UP (ref 0–0.2)
BASOPHILS # BLD AUTO: SIGNIFICANT CHANGE UP (ref 0–0.2)
BASOPHILS NFR BLD AUTO: 0 % — SIGNIFICANT CHANGE UP (ref 0–2)
BASOPHILS NFR BLD AUTO: SIGNIFICANT CHANGE UP (ref 0–2)
BILIRUB SERPL-MCNC: 1.2 MG/DL — SIGNIFICANT CHANGE UP (ref 0.2–1.2)
BILIRUB UR-MCNC: NEGATIVE — SIGNIFICANT CHANGE UP
BLOOD GAS COMMENTS, VENOUS: SIGNIFICANT CHANGE UP
BUN SERPL-MCNC: 30 MG/DL — HIGH (ref 7–18)
BUN SERPL-MCNC: 31 MG/DL — HIGH (ref 7–18)
BUN SERPL-MCNC: 31 MG/DL — HIGH (ref 7–18)
BUN SERPL-MCNC: 34 MG/DL — HIGH (ref 7–18)
CALCIUM SERPL-MCNC: 7.9 MG/DL — LOW (ref 8.4–10.5)
CALCIUM SERPL-MCNC: 8.1 MG/DL — LOW (ref 8.4–10.5)
CALCIUM SERPL-MCNC: 8.1 MG/DL — LOW (ref 8.4–10.5)
CALCIUM SERPL-MCNC: 8.6 MG/DL — SIGNIFICANT CHANGE UP (ref 8.4–10.5)
CHLORIDE SERPL-SCNC: 112 MMOL/L — HIGH (ref 96–108)
CHLORIDE SERPL-SCNC: 112 MMOL/L — HIGH (ref 96–108)
CHLORIDE SERPL-SCNC: 113 MMOL/L — HIGH (ref 96–108)
CHLORIDE SERPL-SCNC: 113 MMOL/L — HIGH (ref 96–108)
CO2 SERPL-SCNC: 17 MMOL/L — LOW (ref 22–31)
CO2 SERPL-SCNC: 18 MMOL/L — LOW (ref 22–31)
COLOR SPEC: YELLOW — SIGNIFICANT CHANGE UP
CREAT ?TM UR-MCNC: 158 MG/DL — SIGNIFICANT CHANGE UP
CREAT SERPL-MCNC: 2.65 MG/DL — HIGH (ref 0.5–1.3)
CREAT SERPL-MCNC: 2.68 MG/DL — HIGH (ref 0.5–1.3)
CREAT SERPL-MCNC: 2.87 MG/DL — HIGH (ref 0.5–1.3)
CREAT SERPL-MCNC: 2.93 MG/DL — HIGH (ref 0.5–1.3)
DIFF PNL FLD: ABNORMAL
EOSINOPHIL # BLD AUTO: 0 K/UL — SIGNIFICANT CHANGE UP (ref 0–0.5)
EOSINOPHIL # BLD AUTO: SIGNIFICANT CHANGE UP (ref 0–0.5)
EOSINOPHIL NFR BLD AUTO: 0 % — SIGNIFICANT CHANGE UP (ref 0–6)
EOSINOPHIL NFR BLD AUTO: SIGNIFICANT CHANGE UP (ref 0–6)
GLUCOSE SERPL-MCNC: 108 MG/DL — HIGH (ref 70–99)
GLUCOSE SERPL-MCNC: 123 MG/DL — HIGH (ref 70–99)
GLUCOSE SERPL-MCNC: 127 MG/DL — HIGH (ref 70–99)
GLUCOSE SERPL-MCNC: 90 MG/DL — SIGNIFICANT CHANGE UP (ref 70–99)
GLUCOSE UR QL: NEGATIVE — SIGNIFICANT CHANGE UP
GRAM STN FLD: SIGNIFICANT CHANGE UP
HCO3 BLDV-SCNC: 16 MMOL/L — LOW (ref 21–29)
HCT VFR BLD CALC: 32.3 % — LOW (ref 39–50)
HCT VFR BLD CALC: 36.6 % — LOW (ref 39–50)
HCT VFR BLD CALC: 37 % — LOW (ref 39–50)
HGB BLD-MCNC: 11.4 G/DL — LOW (ref 13–17)
HGB BLD-MCNC: 11.6 G/DL — LOW (ref 13–17)
HGB BLD-MCNC: 9.6 G/DL — LOW (ref 13–17)
HOROWITZ INDEX BLDV+IHG-RTO: 21 — SIGNIFICANT CHANGE UP
IMM GRANULOCYTES NFR BLD AUTO: SIGNIFICANT CHANGE UP (ref 0–1.5)
INR BLD: 1.26 RATIO — HIGH (ref 0.88–1.16)
KETONES UR-MCNC: ABNORMAL
LACTATE SERPL-SCNC: 3.5 MMOL/L — HIGH (ref 0.7–2)
LACTATE SERPL-SCNC: 3.7 MMOL/L — HIGH (ref 0.7–2)
LACTATE SERPL-SCNC: 3.8 MMOL/L — HIGH (ref 0.7–2)
LACTATE SERPL-SCNC: 4.3 MMOL/L — CRITICAL HIGH (ref 0.7–2)
LEUKOCYTE ESTERASE UR-ACNC: ABNORMAL
LYMPHOCYTES # BLD AUTO: 0.15 K/UL — LOW (ref 1–3.3)
LYMPHOCYTES # BLD AUTO: 9.3 % — LOW (ref 13–44)
LYMPHOCYTES # BLD AUTO: SIGNIFICANT CHANGE UP (ref 13–44)
LYMPHOCYTES # BLD AUTO: SIGNIFICANT CHANGE UP (ref 1–3.3)
MCHC RBC-ENTMCNC: 26 PG — LOW (ref 27–34)
MCHC RBC-ENTMCNC: 26.3 PG — LOW (ref 27–34)
MCHC RBC-ENTMCNC: 26.6 PG — LOW (ref 27–34)
MCHC RBC-ENTMCNC: 29.7 GM/DL — LOW (ref 32–36)
MCHC RBC-ENTMCNC: 30.8 GM/DL — LOW (ref 32–36)
MCHC RBC-ENTMCNC: 31.7 GM/DL — LOW (ref 32–36)
MCV RBC AUTO: 83.9 FL — SIGNIFICANT CHANGE UP (ref 80–100)
MCV RBC AUTO: 85.3 FL — SIGNIFICANT CHANGE UP (ref 80–100)
MCV RBC AUTO: 87.5 FL — SIGNIFICANT CHANGE UP (ref 80–100)
MONOCYTES # BLD AUTO: 0.06 K/UL — SIGNIFICANT CHANGE UP (ref 0–0.9)
MONOCYTES # BLD AUTO: SIGNIFICANT CHANGE UP (ref 0–0.9)
MONOCYTES NFR BLD AUTO: 3.7 % — SIGNIFICANT CHANGE UP (ref 2–14)
MONOCYTES NFR BLD AUTO: SIGNIFICANT CHANGE UP (ref 2–14)
NEUTROPHILS # BLD AUTO: 1.38 K/UL — LOW (ref 1.8–7.4)
NEUTROPHILS # BLD AUTO: SIGNIFICANT CHANGE UP (ref 1.8–7.4)
NEUTROPHILS NFR BLD AUTO: 87 % — HIGH (ref 43–77)
NEUTROPHILS NFR BLD AUTO: SIGNIFICANT CHANGE UP (ref 43–77)
NITRITE UR-MCNC: POSITIVE
NRBC # BLD: 0 /100 WBCS — SIGNIFICANT CHANGE UP (ref 0–0)
NRBC # BLD: 0 /100 WBCS — SIGNIFICANT CHANGE UP (ref 0–0)
OB PNL STL: POSITIVE
OSMOLALITY UR: 349 MOS/KG — SIGNIFICANT CHANGE UP (ref 50–1200)
PCO2 BLDV: 35 MMHG — SIGNIFICANT CHANGE UP (ref 35–50)
PH BLDV: 7.29 — LOW (ref 7.35–7.45)
PH UR: 5 — SIGNIFICANT CHANGE UP (ref 5–8)
PLATELET # BLD AUTO: 68 K/UL — LOW (ref 150–400)
PLATELET # BLD AUTO: 69 K/UL — LOW (ref 150–400)
PLATELET # BLD AUTO: 82 K/UL — LOW (ref 150–400)
PO2 BLDV: 35 MMHG — SIGNIFICANT CHANGE UP (ref 25–45)
POTASSIUM SERPL-MCNC: 3.1 MMOL/L — LOW (ref 3.5–5.3)
POTASSIUM SERPL-MCNC: 3.1 MMOL/L — LOW (ref 3.5–5.3)
POTASSIUM SERPL-MCNC: 3.5 MMOL/L — SIGNIFICANT CHANGE UP (ref 3.5–5.3)
POTASSIUM SERPL-MCNC: 3.6 MMOL/L — SIGNIFICANT CHANGE UP (ref 3.5–5.3)
POTASSIUM SERPL-SCNC: 3.1 MMOL/L — LOW (ref 3.5–5.3)
POTASSIUM SERPL-SCNC: 3.1 MMOL/L — LOW (ref 3.5–5.3)
POTASSIUM SERPL-SCNC: 3.5 MMOL/L — SIGNIFICANT CHANGE UP (ref 3.5–5.3)
POTASSIUM SERPL-SCNC: 3.6 MMOL/L — SIGNIFICANT CHANGE UP (ref 3.5–5.3)
POTASSIUM UR-SCNC: 38 MMOL/L — SIGNIFICANT CHANGE UP (ref 25–125)
PROT SERPL-MCNC: 5 G/DL — LOW (ref 6–8.3)
PROT UR-MCNC: 500 MG/DL
PROTHROM AB SERPL-ACNC: 14.1 SEC — HIGH (ref 10–12.9)
RBC # BLD: 3.69 M/UL — LOW (ref 4.2–5.8)
RBC # BLD: 4.34 M/UL — SIGNIFICANT CHANGE UP (ref 4.2–5.8)
RBC # BLD: 4.36 M/UL — SIGNIFICANT CHANGE UP (ref 4.2–5.8)
RBC # FLD: 14.7 % — HIGH (ref 10.3–14.5)
RBC # FLD: 14.7 % — HIGH (ref 10.3–14.5)
RBC # FLD: 15.4 % — HIGH (ref 10.3–14.5)
SAO2 % BLDV: 61 % — LOW (ref 67–88)
SODIUM SERPL-SCNC: 140 MMOL/L — SIGNIFICANT CHANGE UP (ref 135–145)
SODIUM SERPL-SCNC: 140 MMOL/L — SIGNIFICANT CHANGE UP (ref 135–145)
SODIUM SERPL-SCNC: 141 MMOL/L — SIGNIFICANT CHANGE UP (ref 135–145)
SODIUM SERPL-SCNC: 141 MMOL/L — SIGNIFICANT CHANGE UP (ref 135–145)
SODIUM UR-SCNC: 40 MMOL/L — SIGNIFICANT CHANGE UP (ref 40–220)
SP GR SPEC: 1.01 — SIGNIFICANT CHANGE UP (ref 1.01–1.02)
SPECIMEN SOURCE: SIGNIFICANT CHANGE UP
TROPONIN I SERPL-MCNC: <0.015 NG/ML — SIGNIFICANT CHANGE UP (ref 0–0.04)
UROBILINOGEN FLD QL: 1
WBC # BLD: 1.59 K/UL — LOW (ref 3.8–10.5)
WBC # BLD: 11.11 K/UL — HIGH (ref 3.8–10.5)
WBC # BLD: 7.79 K/UL — SIGNIFICANT CHANGE UP (ref 3.8–10.5)
WBC # FLD AUTO: 1.59 K/UL — LOW (ref 3.8–10.5)
WBC # FLD AUTO: 11.11 K/UL — HIGH (ref 3.8–10.5)
WBC # FLD AUTO: 7.79 K/UL — SIGNIFICANT CHANGE UP (ref 3.8–10.5)

## 2019-10-31 PROCEDURE — 71250 CT THORAX DX C-: CPT | Mod: 26

## 2019-10-31 PROCEDURE — 71045 X-RAY EXAM CHEST 1 VIEW: CPT | Mod: 26

## 2019-10-31 PROCEDURE — 99222 1ST HOSP IP/OBS MODERATE 55: CPT

## 2019-10-31 PROCEDURE — 74176 CT ABD & PELVIS W/O CONTRAST: CPT | Mod: 26

## 2019-10-31 PROCEDURE — 71045 X-RAY EXAM CHEST 1 VIEW: CPT | Mod: 26,77

## 2019-10-31 PROCEDURE — 99291 CRITICAL CARE FIRST HOUR: CPT

## 2019-10-31 PROCEDURE — 99221 1ST HOSP IP/OBS SF/LOW 40: CPT

## 2019-10-31 RX ORDER — NOREPINEPHRINE BITARTRATE/D5W 8 MG/250ML
0.05 PLASTIC BAG, INJECTION (ML) INTRAVENOUS
Qty: 16 | Refills: 0 | Status: DISCONTINUED | OUTPATIENT
Start: 2019-10-31 | End: 2019-11-01

## 2019-10-31 RX ORDER — PHENYLEPHRINE HYDROCHLORIDE 10 MG/ML
0.1 INJECTION INTRAVENOUS
Qty: 40 | Refills: 0 | Status: DISCONTINUED | OUTPATIENT
Start: 2019-10-31 | End: 2019-10-31

## 2019-10-31 RX ORDER — CHLORHEXIDINE GLUCONATE 213 G/1000ML
1 SOLUTION TOPICAL
Refills: 0 | Status: DISCONTINUED | OUTPATIENT
Start: 2019-10-31 | End: 2019-10-31

## 2019-10-31 RX ORDER — HYDROMORPHONE HYDROCHLORIDE 2 MG/ML
0.2 INJECTION INTRAMUSCULAR; INTRAVENOUS; SUBCUTANEOUS ONCE
Refills: 0 | Status: DISCONTINUED | OUTPATIENT
Start: 2019-10-31 | End: 2019-10-31

## 2019-10-31 RX ORDER — HYDROMORPHONE HYDROCHLORIDE 2 MG/ML
0.5 INJECTION INTRAMUSCULAR; INTRAVENOUS; SUBCUTANEOUS EVERY 4 HOURS
Refills: 0 | Status: DISCONTINUED | OUTPATIENT
Start: 2019-10-31 | End: 2019-11-01

## 2019-10-31 RX ORDER — PANTOPRAZOLE SODIUM 20 MG/1
40 TABLET, DELAYED RELEASE ORAL EVERY 12 HOURS
Refills: 0 | Status: DISCONTINUED | OUTPATIENT
Start: 2019-10-31 | End: 2019-11-01

## 2019-10-31 RX ORDER — SODIUM CHLORIDE 9 MG/ML
2000 INJECTION INTRAMUSCULAR; INTRAVENOUS; SUBCUTANEOUS ONCE
Refills: 0 | Status: COMPLETED | OUTPATIENT
Start: 2019-10-31 | End: 2019-10-31

## 2019-10-31 RX ORDER — PHENYLEPHRINE HYDROCHLORIDE 10 MG/ML
0.3 INJECTION INTRAVENOUS
Qty: 160 | Refills: 0 | Status: DISCONTINUED | OUTPATIENT
Start: 2019-10-31 | End: 2019-10-31

## 2019-10-31 RX ORDER — CHLORHEXIDINE GLUCONATE 213 G/1000ML
1 SOLUTION TOPICAL
Refills: 0 | Status: DISCONTINUED | OUTPATIENT
Start: 2019-10-31 | End: 2019-11-01

## 2019-10-31 RX ORDER — PANTOPRAZOLE SODIUM 20 MG/1
80 TABLET, DELAYED RELEASE ORAL ONCE
Refills: 0 | Status: COMPLETED | OUTPATIENT
Start: 2019-10-31 | End: 2019-10-31

## 2019-10-31 RX ORDER — CEFTRIAXONE 500 MG/1
1000 INJECTION, POWDER, FOR SOLUTION INTRAMUSCULAR; INTRAVENOUS ONCE
Refills: 0 | Status: COMPLETED | OUTPATIENT
Start: 2019-10-31 | End: 2019-10-31

## 2019-10-31 RX ORDER — FENTANYL CITRATE 50 UG/ML
50 INJECTION INTRAVENOUS ONCE
Refills: 0 | Status: DISCONTINUED | OUTPATIENT
Start: 2019-10-31 | End: 2019-10-31

## 2019-10-31 RX ORDER — POTASSIUM CHLORIDE 20 MEQ
10 PACKET (EA) ORAL
Refills: 0 | Status: COMPLETED | OUTPATIENT
Start: 2019-10-31 | End: 2019-10-31

## 2019-10-31 RX ORDER — SODIUM CHLORIDE 9 MG/ML
10 INJECTION INTRAMUSCULAR; INTRAVENOUS; SUBCUTANEOUS
Refills: 0 | Status: DISCONTINUED | OUTPATIENT
Start: 2019-10-31 | End: 2019-11-01

## 2019-10-31 RX ORDER — SODIUM CHLORIDE 9 MG/ML
1000 INJECTION, SOLUTION INTRAVENOUS
Refills: 0 | Status: DISCONTINUED | OUTPATIENT
Start: 2019-10-31 | End: 2019-11-01

## 2019-10-31 RX ORDER — ACETAMINOPHEN 500 MG
1000 TABLET ORAL ONCE
Refills: 0 | Status: COMPLETED | OUTPATIENT
Start: 2019-10-31 | End: 2019-10-31

## 2019-10-31 RX ORDER — CEFEPIME 1 G/1
2000 INJECTION, POWDER, FOR SOLUTION INTRAMUSCULAR; INTRAVENOUS ONCE
Refills: 0 | Status: COMPLETED | OUTPATIENT
Start: 2019-10-31 | End: 2019-10-31

## 2019-10-31 RX ORDER — INFLUENZA VIRUS VACCINE 15; 15; 15; 15 UG/.5ML; UG/.5ML; UG/.5ML; UG/.5ML
0.5 SUSPENSION INTRAMUSCULAR ONCE
Refills: 0 | Status: DISCONTINUED | OUTPATIENT
Start: 2019-10-31 | End: 2019-11-01

## 2019-10-31 RX ORDER — CEFEPIME 1 G/1
INJECTION, POWDER, FOR SOLUTION INTRAMUSCULAR; INTRAVENOUS
Refills: 0 | Status: DISCONTINUED | OUTPATIENT
Start: 2019-10-31 | End: 2019-10-31

## 2019-10-31 RX ORDER — CEFEPIME 1 G/1
1000 INJECTION, POWDER, FOR SOLUTION INTRAMUSCULAR; INTRAVENOUS EVERY 12 HOURS
Refills: 0 | Status: DISCONTINUED | OUTPATIENT
Start: 2019-10-31 | End: 2019-11-01

## 2019-10-31 RX ORDER — SODIUM CHLORIDE 9 MG/ML
500 INJECTION, SOLUTION INTRAVENOUS ONCE
Refills: 0 | Status: COMPLETED | OUTPATIENT
Start: 2019-10-31 | End: 2019-10-31

## 2019-10-31 RX ORDER — CEFEPIME 1 G/1
2000 INJECTION, POWDER, FOR SOLUTION INTRAMUSCULAR; INTRAVENOUS EVERY 12 HOURS
Refills: 0 | Status: DISCONTINUED | OUTPATIENT
Start: 2019-10-31 | End: 2019-10-31

## 2019-10-31 RX ORDER — PANTOPRAZOLE SODIUM 20 MG/1
8 TABLET, DELAYED RELEASE ORAL
Qty: 80 | Refills: 0 | Status: DISCONTINUED | OUTPATIENT
Start: 2019-10-31 | End: 2019-10-31

## 2019-10-31 RX ORDER — IPRATROPIUM/ALBUTEROL SULFATE 18-103MCG
3 AEROSOL WITH ADAPTER (GRAM) INHALATION ONCE
Refills: 0 | Status: COMPLETED | OUTPATIENT
Start: 2019-10-31 | End: 2019-10-31

## 2019-10-31 RX ADMIN — CEFTRIAXONE 100 MILLIGRAM(S): 500 INJECTION, POWDER, FOR SOLUTION INTRAMUSCULAR; INTRAVENOUS at 08:01

## 2019-10-31 RX ADMIN — SODIUM CHLORIDE 2000 MILLILITER(S): 9 INJECTION INTRAMUSCULAR; INTRAVENOUS; SUBCUTANEOUS at 07:41

## 2019-10-31 RX ADMIN — Medication 10 MILLIGRAM(S): at 15:06

## 2019-10-31 RX ADMIN — PANTOPRAZOLE SODIUM 80 MILLIGRAM(S): 20 TABLET, DELAYED RELEASE ORAL at 08:01

## 2019-10-31 RX ADMIN — Medication 400 MILLIGRAM(S): at 14:47

## 2019-10-31 RX ADMIN — SODIUM CHLORIDE 500 MILLILITER(S): 9 INJECTION, SOLUTION INTRAVENOUS at 13:12

## 2019-10-31 RX ADMIN — Medication 100 MILLIEQUIVALENT(S): at 12:15

## 2019-10-31 RX ADMIN — HYDROMORPHONE HYDROCHLORIDE 0.2 MILLIGRAM(S): 2 INJECTION INTRAMUSCULAR; INTRAVENOUS; SUBCUTANEOUS at 10:15

## 2019-10-31 RX ADMIN — SODIUM CHLORIDE 100 MILLILITER(S): 9 INJECTION, SOLUTION INTRAVENOUS at 13:19

## 2019-10-31 RX ADMIN — Medication 100 MILLIEQUIVALENT(S): at 13:26

## 2019-10-31 RX ADMIN — Medication 4.39 MICROGRAM(S)/KG/MIN: at 12:05

## 2019-10-31 RX ADMIN — CEFEPIME 100 MILLIGRAM(S): 1 INJECTION, POWDER, FOR SOLUTION INTRAMUSCULAR; INTRAVENOUS at 22:39

## 2019-10-31 RX ADMIN — HYDROMORPHONE HYDROCHLORIDE 0.5 MILLIGRAM(S): 2 INJECTION INTRAMUSCULAR; INTRAVENOUS; SUBCUTANEOUS at 14:42

## 2019-10-31 RX ADMIN — HYDROMORPHONE HYDROCHLORIDE 0.5 MILLIGRAM(S): 2 INJECTION INTRAMUSCULAR; INTRAVENOUS; SUBCUTANEOUS at 15:06

## 2019-10-31 RX ADMIN — HYDROMORPHONE HYDROCHLORIDE 0.5 MILLIGRAM(S): 2 INJECTION INTRAMUSCULAR; INTRAVENOUS; SUBCUTANEOUS at 23:49

## 2019-10-31 RX ADMIN — Medication 1000 MILLIGRAM(S): at 15:06

## 2019-10-31 RX ADMIN — SODIUM CHLORIDE 500 MILLILITER(S): 9 INJECTION, SOLUTION INTRAVENOUS at 16:17

## 2019-10-31 RX ADMIN — PHENYLEPHRINE HYDROCHLORIDE 3.06 MICROGRAM(S)/KG/MIN: 10 INJECTION INTRAVENOUS at 09:08

## 2019-10-31 RX ADMIN — HYDROMORPHONE HYDROCHLORIDE 0.2 MILLIGRAM(S): 2 INJECTION INTRAMUSCULAR; INTRAVENOUS; SUBCUTANEOUS at 10:24

## 2019-10-31 RX ADMIN — Medication 3 MILLILITER(S): at 21:33

## 2019-10-31 RX ADMIN — PANTOPRAZOLE SODIUM 10 MG/HR: 20 TABLET, DELAYED RELEASE ORAL at 10:38

## 2019-10-31 RX ADMIN — PANTOPRAZOLE SODIUM 40 MILLIGRAM(S): 20 TABLET, DELAYED RELEASE ORAL at 18:09

## 2019-10-31 RX ADMIN — CEFEPIME 100 MILLIGRAM(S): 1 INJECTION, POWDER, FOR SOLUTION INTRAMUSCULAR; INTRAVENOUS at 12:29

## 2019-10-31 RX ADMIN — CEFEPIME 100 MILLIGRAM(S): 1 INJECTION, POWDER, FOR SOLUTION INTRAMUSCULAR; INTRAVENOUS at 18:10

## 2019-10-31 NOTE — CONSULT NOTE ADULT - SUBJECTIVE AND OBJECTIVE BOX
Patient is a 74y old  Male who presents with a chief complaint of Septic shock (31 Oct 2019 15:15)  Pt is as 75y/o M, from home, lives w/ wife, with PMHx of CAD s/p 1 stent, pre Diabetes, HLD, HTN, Hypothyroidism, congenital atrophic Rt kidney, Renal Stones, and Obesity who presents to ED with Abdominal pain x 2 days.  Pt states yesterday he developed diffuse, 8/10, constant, sharp, non-radiating pain exacerbated when flat, and improved when sitting forward.  Pt reports assoc SOB 2/2 pain.  Pt states pain started when sitting on couch watching TV.  He took two baby asa with no relief.  Pt denies similar pain in the past, and states he suffers from chronic constipation- last BM yesterday evening appeared dark.  Pt denies diarrhea, but states he had 2 episodes of dark formed stool since yesterday.  Pt last took his metoprolol yesterday. Pt denies fever, chills, nausea, vomiting, HA, dizziness, recent illness, sick contacts, recent antibiotic use, or NSAID exposure.  Pt states he underwent colonoscopy/ endoscopy 2 years prior which was only significant for hemorrhoids.  Pt denies CP,  palpitations, rash, extremity edema, cough, weakness, numbness, or syncope.     REVIEW OF SYSTEMS  Constitutional:   No fever, no fatigue, no pallor, no night sweats, no weight loss.  HEENT:   No eye pain, no vision changes, no icterus, no mouth ulcers.  Respiratory:   No shortness of breath, no cough, no respiratory distress.   Cardiovascular:   No chest pain, no palpitations.   Gastrointestinal: No abdominal pain, no nausea, no vomiting , no diahrrea, no constipation, no hematochezia,no melena.  Skin:   No rashes, no jaundice, no eczema.   Musculoskeletal:   No joint pain, no swelling, no myalgia.   Neurologic:   No headache, no seizure, no weakness.   Genitourinary:   No dysuria, no decreased urine output.  Psychiatric:  No depression, no anxiety,   Endocrine:   No thyroid disease, no diabetes.  Heme/Lymphatic:   No anemia, no blood transfusions, no lymph node enlargement, no bleeding, no bruising.  ___________________________________________________________________________________________  Allergies    No Known Allergies    Intolerances      MEDICATIONS  (STANDING):  cefepime   IVPB      cefepime   IVPB 2000 milliGRAM(s) IV Intermittent every 12 hours  influenza   Vaccine 0.5 milliLiter(s) IntraMuscular once  lactated ringers. 1000 milliLiter(s) (100 mL/Hr) IV Continuous <Continuous>  norepinephrine Infusion 0.05 MICROgram(s)/kG/Min (4.392 mL/Hr) IV Continuous <Continuous>  pantoprazole  Injectable 40 milliGRAM(s) IV Push every 12 hours    MEDICATIONS  (PRN):  HYDROmorphone  Injectable 0.5 milliGRAM(s) IV Push every 4 hours PRN Severe Pain (7 - 10)      PAST MEDICAL & SURGICAL HISTORY:  Borderline diabetes  Hyperlipidemia  Obesity (BMI 30-39.9)  CAD (coronary artery disease)  Hypothyroid  Hypertension  After cataract, bilateral  S/P hernia repair: left inguinal  History of kidney stones  H/O total knee replacement, left  H/O heart artery stent:     FAMILY HISTORY:  Family history of hypertension: mother  Family history of diabetes mellitus (Sibling): sister and bothers    Social History: No hsitory of : Tobacco use, IVDA, EToH  ______________________________________________________________________________________    PHYSICAL EXAM    Daily Height in cm: 167.64 (31 Oct 2019 06:58)    Daily Weight in k.7 (31 Oct 2019 09:42)  BMI: 33.3 (10-31 @ 09:42)  Change in Weight:  Vital Signs Last 24 Hrs  T(C): 36.7 (31 Oct 2019 16:11), Max: 37.4 (31 Oct 2019 09:27)  T(F): 98.1 (31 Oct 2019 16:11), Max: 99.4 (31 Oct 2019 09:27)  HR: 76 (31 Oct 2019 17:00) (76 - 96)  BP: 92/59 (31 Oct 2019 17:00) (65/39 - 121/71)  BP(mean): 63 (31 Oct 2019 17:00) (58 - 81)  RR: 20 (31 Oct 2019 17:00) (17 - 31)  SpO2: 96% (31 Oct 2019 17:00) (94% - 100%)    General:  Well developed, well nourished, alert and active, no pallor, NAD.  HEENT:    Normal appearance of conjunctiva, ears, nose, lips, oropharynx, and oral mucosa, anicteric.  Neck:  No masses, no asymmetry.  Lymph Nodes:  No lymphadenopathy.   Cardiovascular:  RRR normal S1/S2, no murmur.  Respiratory:  CTA B/L, normal respiratory effort.   Abdominal:   soft, no masses or tenderness, normoactive BS, NT/ND, no HSM.  Extremities:   No clubbing or cyanosis, normal capillary refill, no edema.   Skin:   No rash, jaundice, lesions, eczema.   Musculoskeletal:  No joint swelling, erythema or tenderness.   Neuro: No focal deficits.   Other:   _______________________________________________________________________________________________  Lab Results:                          11.4   11.11 )-----------( 68       ( 31 Oct 2019 15:05 )             37.0     10-31    140  |  113<H>  |  31<H>  ----------------------------<  123<H>  3.6   |  17<L>  |  2.87<H>    Ca    7.9<L>      31 Oct 2019 15:05    TPro  5.0<L>  /  Alb  2.0<L>  /  TBili  1.2  /  DBili  x   /  AST  18  /  ALT  17  /  AlkPhos  258<H>  10-31    LIVER FUNCTIONS - ( 31 Oct 2019 07:18 )  Alb: 2.0 g/dL / Pro: 5.0 g/dL / ALK PHOS: 258 U/L / ALT: 17 U/L DA / AST: 18 U/L / GGT: x           PT/INR - ( 31 Oct 2019 07:18 )   PT: 14.1 sec;   INR: 1.26 ratio         PTT - ( 31 Oct 2019 07:18 )  PTT:24.7 sec    CARDIAC MARKERS ( 31 Oct 2019 07:18 )  <0.015 ng/mL / x     / x     / x     / x          Stool Results:          RADIOLOGY RESULTS:    SURGICAL PATHOLOGY: 74 year old  Male who presents with a chief complaint of Septic shock (31 Oct 2019 15:15)  Pt is as 74 year old male with a PMHx of CAD s/p 1 stent, pre Diabetes, HLD, HTN, Hypothyroidism, congenital atrophic Rt kidney, Renal Stones, and Obesity who presents to ED with Abdominal pain x 2 days.  Pt states yesterday he developed diffuse, 8/10, constant, sharp, non-radiating pain exacerbated when flat, and improved when sitting forward.  Pt reports assoc SOB 2/2 pain.  Pt states pain started when sitting on couch watching TV.  He took two baby asa with no relief.  Pt denies similar pain in the past, and states he suffers from chronic constipation- last BM yesterday evening appeared dark.  Pt denies diarrhea, but states he had 2 episodes of dark formed stool since yesterday.      The patient is known to me from my outpatient office. He had a colonoscopy and  EGD last year with no signfiiant abnormalities.     REVIEW OF SYSTEMS  Constitutional:   +fever, no fatigue, no pallor, no night sweats, no weight loss.  HEENT:   No eye pain, no vision changes, no icterus, no mouth ulcers.  Respiratory:   No shortness of breath, no cough, no respiratory distress.   Cardiovascular:   No chest pain, no palpitations.   Gastrointestinal: + abdominal pain, no nausea, no vomiting , no diarrhea no constipation, no hematochezia, no melena.  Skin:   No rashes, no jaundice, no eczema.   Musculoskeletal:   No joint pain, no swelling, no myalgia.   Neurologic:   No headache, no seizure, no weakness.   Genitourinary:   No dysuria, no decreased urine output.  Psychiatric:  No depression, no anxiety,   Endocrine:   No thyroid disease, no diabetes.  Heme/Lymphatic:   No anemia, no blood transfusions, no lymph node enlargement, no bleeding, no bruising.  ___________________________________________________________________________________________  Allergies    No Known Allergies    Intolerances      MEDICATIONS  (STANDING):  cefepime   IVPB      cefepime   IVPB 2000 milliGRAM(s) IV Intermittent every 12 hours  influenza   Vaccine 0.5 milliLiter(s) IntraMuscular once  lactated ringers. 1000 milliLiter(s) (100 mL/Hr) IV Continuous <Continuous>  norepinephrine Infusion 0.05 MICROgram(s)/kG/Min (4.392 mL/Hr) IV Continuous <Continuous>  pantoprazole  Injectable 40 milliGRAM(s) IV Push every 12 hours    MEDICATIONS  (PRN):  HYDROmorphone  Injectable 0.5 milliGRAM(s) IV Push every 4 hours PRN Severe Pain (7 - 10)      PAST MEDICAL & SURGICAL HISTORY:  Borderline diabetes  Hyperlipidemia  Obesity (BMI 30-39.9)  CAD (coronary artery disease)  Hypothyroid  Hypertension  After cataract, bilateral  S/P hernia repair: left inguinal  History of kidney stones  H/O total knee replacement, left  H/O heart artery stent:     FAMILY HISTORY:  Family history of hypertension: mother  Family history of diabetes mellitus (Sibling): sister and bothers    Social History: No hsitory of : Tobacco use, IVDA, EToH  ______________________________________________________________________________________    PHYSICAL EXAM    Daily Height in cm: 167.64 (31 Oct 2019 06:58)    Daily Weight in k.7 (31 Oct 2019 09:42)  BMI: 33.3 (10-31 @ 09:42)  Change in Weight:  Vital Signs Last 24 Hrs  T(C): 36.7 (31 Oct 2019 16:11), Max: 37.4 (31 Oct 2019 09:27)  T(F): 98.1 (31 Oct 2019 16:11), Max: 99.4 (31 Oct 2019 09:27)  HR: 76 (31 Oct 2019 17:00) (76 - 96)  BP: 92/59 (31 Oct 2019 17:00) (65/39 - 121/71)  BP(mean): 63 (31 Oct 2019 17:00) (58 - 81)  RR: 20 (31 Oct 2019 17:00) (17 - 31)  SpO2: 96% (31 Oct 2019 17:00) (94% - 100%)    General:  Well developed, well nourished, alert and active, no pallor, NAD.  HEENT:    Normal appearance of conjunctiva, ears, nose, lips, oropharynx, and oral mucosa, anicteric.  Neck:  No masses, no asymmetry.  Lymph Nodes:  No lymphadenopathy.   Cardiovascular:  RRR normal S1/S2, no murmur.  Respiratory:  CTA B/L, normal respiratory effort.   Abdominal:   soft, no masses or tenderness, normoactive BS, NT/ND, no HSM.  Extremities:   No clubbing or cyanosis, normal capillary refill, no edema.   Skin:   No rash, jaundice, lesions, eczema.   Musculoskeletal:  No joint swelling, erythema or tenderness.   Neuro: No focal deficits.   Other:   _______________________________________________________________________________________________  Lab Results:                          11.4   11.11 )-----------( 68       ( 31 Oct 2019 15:05 )             37.0     10-31    140  |  113<H>  |  31<H>  ----------------------------<  123<H>  3.6   |  17<L>  |  2.87<H>    Ca    7.9<L>      31 Oct 2019 15:05    TPro  5.0<L>  /  Alb  2.0<L>  /  TBili  1.2  /  DBili  x   /  AST  18  /  ALT  17  /  AlkPhos  258<H>  10-31    LIVER FUNCTIONS - ( 31 Oct 2019 07:18 )  Alb: 2.0 g/dL / Pro: 5.0 g/dL / ALK PHOS: 258 U/L / ALT: 17 U/L DA / AST: 18 U/L / GGT: x           PT/INR - ( 31 Oct 2019 07:18 )   PT: 14.1 sec;   INR: 1.26 ratio         PTT - ( 31 Oct 2019 07:18 )  PTT:24.7 sec    CARDIAC MARKERS ( 31 Oct 2019 07:18 )  <0.015 ng/mL / x     / x     / x     / x          Stool Results:          RADIOLOGY RESULTS:  < from: CT Abdomen and Pelvis No Cont (10.31.19 @ 08:37) >    EXAM:  CT ABDOMEN AND PELVIS                          EXAM:  CT CHEST                            PROCEDURE DATE:  10/31/2019          INTERPRETATION:  CT of the chest, abdomen, and pelvis without IV contrast    Indication: GI bleed.    Technique: Axial multidetector CT images of the chest, abdomen, and   pelvis are acquired without IV contrast.    Comparison: None.    Findings:    Chest: No pleural or pericardial effusion. The heart is not enlarged.   Aortic and coronary artery calcifications are present. Mild ectasia of   the ascending aorta at 3.7 cm in diameter. 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 Allowing for the noncontrast technique, there is no   grossly enlarged mediastinal, hilar, or axillary lymph node.    The trachea and central bronchi are patent.    No evidence for pneumothorax or pulmonary consolidation. Small bilateral   atelectasis. Nonspecific 4 mm subpleural right middle lobe lung nodule   (image 77 series 2). Nonspecific 4 mm subpleural left lower lobe lung   nodule (image 25 series 2). If the patient is in the high risk category   (i.e. smoker), follow-up chest CT may be pursued in 12 months to ensure   stability.    Abdomen/pelvis: Evaluation of the abdomen and pelvis is limited by lack   of IV and oral contrast. Allowing for the noncontrast technique, the   liver, common bile duct, pancreas, and spleen appear grossly   unremarkable. Nonspecific mild adrenal thickening bilaterally.   Cholecystectomy clips are present.    There are 2 right distal ureteral calculi measuring up to 1.2 cm with   associated moderate to severe right hydroureteronephrosis. The right   kidney is atrophic. Small hypodense lesion in the right kidney,   representing a probable cyst. There is a small nonobstructive calculus in   the left kidney. No evidence for a calculus in the left ureter. There is   compensatory hypertrophy of the left kidney. Small hypodense lesion in   the left kidney, representing a probable cyst. No left hydronephrosis.    The appendix appears normal. Colonic diverticulosis without evidence for   diverticulitis. Nonspecific submucosal fat deposition of the ascending   colon. No bowel obstruction or grossly thickened bowel wall.    Small fat-containing periumbilical hernia.     No evidence for free air, or ascites. Mildly enlarged external iliac   chain lymph nodes bilaterally measuring up to 1.3 cm on the right and 1.1   cm on the left.     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    Geronimo catheter in the urinary bladder. The urinary bladder is collapsed,   rendering evaluation of the urinary bladder wall difficult.  Mild   stranding adjacent to the urinary bladder, suggestive of cystitis. The   prostate and seminal vesicles appear grossly unremarkable.    Degenerative spondylosis. 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    Impression:    No evidence for pneumothorax or pulmonary consolidation.    Small bilateral lung nodules; if the patient is in the high risk category   (i.e. smoker), follow-up chest CT may be pursued in 12 months to ensure   stability.    The appendix appears normal. Colonic diverticulosis without evidence for   diverticulitis. No bowelobstruction or grossly thickened bowel wall.    Mildly enlarged external iliac chain lymph nodes bilaterally measuring up   to 1.3 cm on the right and 1.1 cm on the left.    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    Mild stranding adjacent to the urinary bladder, suggestive of cystitis.    Other findings as above.                SUZETTE GOMEZ M.D., ATTENDING RADIOLOGIST  This document has been electronicallysigned. Oct 31 2019  9:27AM                < end of copied text >    SURGICAL PATHOLOGY:

## 2019-10-31 NOTE — H&P ADULT - ATTENDING COMMENTS
Patient seen and examined with resident, Addendum to above.    73y/o with PMHx of CAD s/p 1 stent, pre Diabetes, HLD, HTN, Hypothyroidism, congenital atrophic Rt kidney, Renal Stones, and Obesity presented with abdominal pain. Found to be septic secondary to right hydronephrosis and ureteral stone. Initially reported dark colored stools for 1 day as well. But according to the ED attending brown colored guaiac positive stools. S/p 2 units PRBC in the ED.    Assessment:  1. Septic shock  2. Lactic acidosis  3. Pyelonephritis with obstructive ureteral stone  4. Anemia - chronic vs. acute - occult positive   5. CAD   6. HALEY on CKD     - Admit patient to ICU  - Broad spectrum antibiotics  - ID consult  - IV fluid resuscitation  - Monitor lactate  - Urology consult for possible intervention  - Monitor hemoglobin  - Geronimo catheter placed  - Pain control  - Repeats labs in PM  - PPI BID for now  - GI consult  - DVT prophylaxis with SCD Patient seen and examined with resident, Addendum to above.    75y/o with PMHx of CAD s/p 1 stent, pre Diabetes, HLD, HTN, Hypothyroidism, congenital atrophic Rt kidney, Renal Stones, and Obesity presented with abdominal pain. Found to be septic secondary to right hydronephrosis and ureteral stone. Initially reported dark colored stools for 1 day as well. But according to the ED attending brown colored guaiac positive stools. S/p 2 units PRBC in the ED.    Assessment:  1. Septic shock  2. Lactic acidosis  3. Right Hydronephrosis with obstructive ureteral stone  4. Urinary tract infection  5. HALEY on CKD    6. Anemia - chronic vs. acute - occult positive   7. CAD      - Admit patient to ICU  - Broad spectrum antibiotics  - ID consult  - IV fluid resuscitation  - Monitor lactate  - Urology consult for possible intervention  - Monitor hemoglobin  - Geronimo catheter placed  - Pain control  - Repeats labs in PM  - PPI BID for now  - GI consult  - DVT prophylaxis with SCD

## 2019-10-31 NOTE — ED ADULT NURSE REASSESSMENT NOTE - NS ED NURSE REASSESS COMMENT FT1
received pt from [facility]/Ox[facility], 3 IV lines intact, currently receiving 2L NS bolus & massive transfusion of 2 units of blood. Breathing unlabored, cardiac monitoring in place.

## 2019-10-31 NOTE — CONSULT NOTE ADULT - SUBJECTIVE AND OBJECTIVE BOX
CHIEF COMPLAINT:     HPI: 74 y.o. male with CAD with stent (2013), hyperlipidemia, hypertension, borderline DM presents with pain     PAST MEDICAL & SURGICAL HISTORY:  Borderline diabetes  Hyperlipidemia  Obesity (BMI 30-39.9)  CAD (coronary artery disease)  Hypothyroid  Hypertension  After cataract, bilateral  S/P hernia repair: left inguinal  History of kidney stones  H/O total knee replacement, left  H/O heart artery stent: 2013      Allergies    No Known Allergies    Intolerances        MEDICATIONS  (STANDING):  cefepime   IVPB      cefepime   IVPB 2000 milliGRAM(s) IV Intermittent every 12 hours  lactated ringers Bolus 500 milliLiter(s) IV Bolus once  lactated ringers. 1000 milliLiter(s) (100 mL/Hr) IV Continuous <Continuous>  norepinephrine Infusion 0.05 MICROgram(s)/kG/Min (4.392 mL/Hr) IV Continuous <Continuous>  pantoprazole Infusion 8 mG/Hr (10 mL/Hr) IV Continuous <Continuous>  phenylephrine    Infusion 0.1 MICROgram(s)/kG/Min (3.06 mL/Hr) IV Continuous <Continuous>    MEDICATIONS  (PRN):      FAMILY HISTORY:  Family history of hypertension: mother  Family history of diabetes mellitus (Sibling): sister and bothers    No family history of premature coronary artery disease or sudden cardiac death    SOCIAL HISTORY:  Smoking-  Alcohol-  Ilicit Drug use-    REVIEW OF SYSTEMS:  Constitutional: [ ] fever, [ ]weight loss,  [ ]fatigue  Eyes: [ ] visual changes  Respiratory: [ ]shortness of breath;  [ ] cough, [ ]wheezing, [ ]chills, [ ]hemoptysis  Cardiovascular: [ ] chest pain, [ ]palpitations, [ ]dizziness,  [ ]leg swelling  Gastrointestinal: [ ] abdominal pain, [ ]nausea, [ ]vomiting,  [ ]diarrhea   Genitourinary: [ ] dysuria, [ ] hematuria  Neurologic: [ ] headaches [ ] tremors  [ ] weakness [ ] lightheadedness  Skin: [ ] itching, [ ]burning, [ ] rashes  Endocrine: [ ] heat or cold intolerance  Musculoskeletal: [ ] joint pain or swelling; [ ] muscle, back, or extremity pain  Psychiatric: [ ] depression, [ ]anxiety, [ ]mood swings, or [ ]difficulty sleeping  Hematologic: [ ] easy bruising, [ ] bleeding gums       [ x] All others negative	  [ ] Unable to obtain    Vital Signs Last 24 Hrs  T(C): 36.7 (31 Oct 2019 12:00), Max: 37.4 (31 Oct 2019 09:27)  T(F): 98 (31 Oct 2019 12:00), Max: 99.4 (31 Oct 2019 09:27)  HR: 80 (31 Oct 2019 12:30) (80 - 96)  BP: 93/62 (31 Oct 2019 12:30) (65/39 - 102/60)  BP(mean): 69 (31 Oct 2019 12:30) (58 - 69)  RR: 23 (31 Oct 2019 12:30) (17 - 31)  SpO2: 100% (31 Oct 2019 12:30) (95% - 100%)  I&O's Summary    31 Oct 2019 07:01  -  31 Oct 2019 12:42  --------------------------------------------------------  IN: 278 mL / OUT: 63 mL / NET: 215 mL        PHYSICAL EXAM:  General: No acute distress  HEENT: EOMI, PERRL  Neck: Supple, No JVD  Lungs: Clear to auscultation bilaterally; No rales or wheezing  Heart: Regular rate and rhythm; No murmurs, rubs, or gallops  Abdomen: Nontender, bowel sounds present  Extremities: No clubbing, cyanosis, or edema  Nervous system:  Alert & Oriented X3, no focal deficits  Psychiatric: Normal affect  Skin: No rashes or lesions      LABS:  10-31    141  |  113<H>  |  30<H>  ----------------------------<  108<H>  3.1<L>   |  18<L>  |  2.68<H>    Ca    8.1<L>      31 Oct 2019 12:00    TPro  5.0<L>  /  Alb  2.0<L>  /  TBili  1.2  /  DBili  x   /  AST  18  /  ALT  17  /  AlkPhos  258<H>  10-31    Creatinine Trend: 2.68<--, 2.65<--                        11.6   7.79  )-----------( 69       ( 31 Oct 2019 11:59 )             36.6     PT/INR - ( 31 Oct 2019 07:18 )   PT: 14.1 sec;   INR: 1.26 ratio         PTT - ( 31 Oct 2019 07:18 )  PTT:24.7 sec    Lipid Panel:   Cardiac Enzymes: CARDIAC MARKERS ( 31 Oct 2019 07:18 )  <0.015 ng/mL / x     / x     / x     / x                RADIOLOGY:    ECG [my interpretation]:    TELEMETRY:    ECHO:    STRESS TEST:    CATHETERIZATION: CHIEF COMPLAINT:     HPI: 74 y.o. male with CAD with stent (2013), hyperlipidemia, hypertension, borderline DM presents with pain     PAST MEDICAL & SURGICAL HISTORY:  Borderline diabetes  Hyperlipidemia  Obesity (BMI 30-39.9)  CAD (coronary artery disease)  Hypothyroid  Hypertension  After cataract, bilateral  S/P hernia repair: left inguinal  History of kidney stones  H/O total knee replacement, left  H/O heart artery stent: 2013      Allergies    No Known Allergies    Intolerances        MEDICATIONS  (STANDING):  cefepime   IVPB      cefepime   IVPB 2000 milliGRAM(s) IV Intermittent every 12 hours  lactated ringers Bolus 500 milliLiter(s) IV Bolus once  lactated ringers. 1000 milliLiter(s) (100 mL/Hr) IV Continuous <Continuous>  norepinephrine Infusion 0.05 MICROgram(s)/kG/Min (4.392 mL/Hr) IV Continuous <Continuous>  pantoprazole Infusion 8 mG/Hr (10 mL/Hr) IV Continuous <Continuous>  phenylephrine    Infusion 0.1 MICROgram(s)/kG/Min (3.06 mL/Hr) IV Continuous <Continuous>    MEDICATIONS  (PRN):      FAMILY HISTORY:  Family history of hypertension: mother  Family history of diabetes mellitus (Sibling): sister and bothers    No family history of premature coronary artery disease or sudden cardiac death    SOCIAL HISTORY:  Smoking-  Alcohol-  Ilicit Drug use-    REVIEW OF SYSTEMS:  Constitutional: [ ] fever, [ ]weight loss,  [ ]fatigue  Eyes: [ ] visual changes  Respiratory: [ ]shortness of breath;  [ ] cough, [ ]wheezing, [ ]chills, [ ]hemoptysis  Cardiovascular: [ ] chest pain, [ ]palpitations, [ ]dizziness,  [ ]leg swelling  Gastrointestinal: [ ] abdominal pain, [ ]nausea, [ ]vomiting,  [ ]diarrhea   Genitourinary: [ ] dysuria, [ ] hematuria  Neurologic: [ ] headaches [ ] tremors  [ ] weakness [ ] lightheadedness  Skin: [ ] itching, [ ]burning, [ ] rashes  Endocrine: [ ] heat or cold intolerance  Musculoskeletal: [ ] joint pain or swelling; [ ] muscle, back, or extremity pain  Psychiatric: [ ] depression, [ ]anxiety, [ ]mood swings, or [ ]difficulty sleeping  Hematologic: [ ] easy bruising, [ ] bleeding gums       [ x] All others negative	  [ ] Unable to obtain    Vital Signs Last 24 Hrs  T(C): 36.7 (31 Oct 2019 12:00), Max: 37.4 (31 Oct 2019 09:27)  T(F): 98 (31 Oct 2019 12:00), Max: 99.4 (31 Oct 2019 09:27)  HR: 80 (31 Oct 2019 12:30) (80 - 96)  BP: 93/62 (31 Oct 2019 12:30) (65/39 - 102/60)  BP(mean): 69 (31 Oct 2019 12:30) (58 - 69)  RR: 23 (31 Oct 2019 12:30) (17 - 31)  SpO2: 100% (31 Oct 2019 12:30) (95% - 100%)  I&O's Summary    31 Oct 2019 07:01  -  31 Oct 2019 12:42  --------------------------------------------------------  IN: 278 mL / OUT: 63 mL / NET: 215 mL        PHYSICAL EXAM:  General: No acute distress  HEENT: EOMI, PERRL  Neck: Supple, No JVD  Lungs: Clear to auscultation bilaterally; No rales or wheezing  Heart: Regular rate and rhythm; No murmurs, rubs, or gallops  Abdomen: Nontender, bowel sounds present  Extremities: No clubbing, cyanosis, or edema  Nervous system:  Alert & Oriented X3, no focal deficits  Psychiatric: Normal affect  Skin: No rashes or lesions      LABS:  10-31    141  |  113<H>  |  30<H>  ----------------------------<  108<H>  3.1<L>   |  18<L>  |  2.68<H>    Ca    8.1<L>      31 Oct 2019 12:00    TPro  5.0<L>  /  Alb  2.0<L>  /  TBili  1.2  /  DBili  x   /  AST  18  /  ALT  17  /  AlkPhos  258<H>  10-31    Creatinine Trend: 2.68<--, 2.65<--                        11.6   7.79  )-----------( 69       ( 31 Oct 2019 11:59 )             36.6     PT/INR - ( 31 Oct 2019 07:18 )   PT: 14.1 sec;   INR: 1.26 ratio         PTT - ( 31 Oct 2019 07:18 )  PTT:24.7 sec    Lipid Panel:   Cardiac Enzymes: CARDIAC MARKERS ( 31 Oct 2019 07:18 )  <0.015 ng/mL / x     / x     / x     / x                RADIOLOGY: < from: Xray Chest 1 View- PORTABLE-Urgent (10.31.19 @ 12:30) >  NTERPRETATION:  Chest one view    HISTORY: Central line placement    COMPARISON STUDY: Earlier the same day    Frontal expiratory view of the chest shows the heart to be similar in   size. Right internal jugular line reaches the level of the superior vena   cava. The lungs are clear and there is no evidence of pneumothorax nor   pleural effusion.    IMPRESSION:  No pneumothorax.      < end of copied text >      < from: CT Chest No Cont (10.31.19 @ 08:44) >  No evidence for pneumothorax or pulmonary consolidation.    Small bilateral lung nodules; if the patient is in the high risk category   (i.e. smoker), follow-up chest CT may be pursued in 12 months to ensure   stability.    The appendix appears normal. Colonic diverticulosis without evidence for   diverticulitis. No bowelobstruction or grossly thickened bowel wall.    Mildly enlarged external iliac chain lymph nodes bilaterally measuring up   to 1.3 cm on the right and 1.1 cm on the left.    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    Mild stranding adjacent to the urinary bladder, suggestive of cystitis.    Other findings as above.      < end of copied text >        ECG [my interpretation]:     TELEMETRY:     ECHO: < from: Transthoracic Echocardiogram (04.12.19 @ 09:00) >  1. Normal mitral valve. Mild mitral regurgitation.  2. Calcified, probably trileaflet aortic valve is not well  seen. No aortic stenosis. Mild aortic regurgitation.  3. Normal aortic root.  4. Normal left atrium.  5. Normal left ventricular internal dimensions and wall  thicknesses.  6. Normal Left Ventricular Systolic Function,  (EF =59% by  biplane)  7. Grade I diastolic dysfunction (Impaired relaxation).  8. Normal right atrium.  9. Normal right ventricular size and systolic function  (TAPSE 1.7 cm).  10. Normal tricuspid valve. Trace tricuspid regurgitation.  11. No pericardial effusion.    *** No previous Echo exam.    < end of copied text >      STRESS TEST: < from: Nuclear Stress Test-Pharmacologic (04.12.19 @ 09:00) >  * There is a small, fixed, mild intensity defect in the  mid inferolateral wall that thickens, consistent with  attenuation artifact.  *  Post-stress resting myocardial perfusion gated SPECT  imaging was performed (LVEF = 69 %;LVEDV = 90 ml.)  * Negative study for reversible ischemia    ------------------------------------------------------------------------        < end of copied text >      CATHETERIZATION: CHIEF COMPLAINT: Dyspnea and Abdominal Pain    HPI: 74 y.o. male with CAD with stent (2013), hyperlipidemia, hypertension, borderline DM presents with dyspnea and abdominal pain since yesterday. Pt describes his pain as sharp, 8/10 subsiding now. He complains that it's hard for him to breathe, but denies any chest pains, palpitations, dizziness, headache, nausea or vomiting. This morning he felt perspiring, took 2 baby ASA prompting him to come to ED this morning. He also reports black, hard stools. He is currently in ICU, appears hypoxic, and requires frequent rest periods when talking. He lives with his wife who cooks for him vegetarian diet. He denies any leg edema, retired and not as active as he used to. He takes elevators most of the time, but when he visits his daughter he takes stairs with frequent rest periods due to SOB that the has had for many months. Pt reports taking all medications as prescribed and following up with his doctors as needed.      PAST MEDICAL & SURGICAL HISTORY:  Borderline diabetes  Hyperlipidemia  Obesity (BMI 30-39.9)  CAD (coronary artery disease)  Hypothyroid  Hypertension  After cataract, bilateral  S/P hernia repair: left inguinal  History of kidney stones  H/O total knee replacement, left  H/O heart artery stent: 2013      Allergies    No Known Allergies    Intolerances        MEDICATIONS  (STANDING):  cefepime   IVPB      cefepime   IVPB 2000 milliGRAM(s) IV Intermittent every 12 hours  lactated ringers Bolus 500 milliLiter(s) IV Bolus once  lactated ringers. 1000 milliLiter(s) (100 mL/Hr) IV Continuous <Continuous>  norepinephrine Infusion 0.05 MICROgram(s)/kG/Min (4.392 mL/Hr) IV Continuous <Continuous>  pantoprazole Infusion 8 mG/Hr (10 mL/Hr) IV Continuous <Continuous>  phenylephrine    Infusion 0.1 MICROgram(s)/kG/Min (3.06 mL/Hr) IV Continuous <Continuous>    MEDICATIONS  (PRN):      FAMILY HISTORY:  Family history of hypertension: mother  Family history of diabetes mellitus (Sibling): sister and brothers    No family history of premature coronary artery disease or sudden cardiac death    SOCIAL HISTORY:  Smoking-ex-smoker 15 pack year history   Alcohol-2 shots of whiskey every day  Ilicit Drug use-denies    REVIEW OF SYSTEMS:  Constitutional: [ ] fever, [ ]weight loss,  [ ]fatigue  Eyes: [ ] visual changes  Respiratory: [X ]shortness of breath;  [ ] cough, [ ]wheezing, [ ]chills, [ ]hemoptysis  Cardiovascular: [ ] chest pain, [ ]palpitations, [ ]dizziness,  [ ]leg swelling  Gastrointestinal: [ ] abdominal pain, [ ]nausea, [ ]vomiting,  [ ]diarrhea   Genitourinary: [ ] dysuria, [ ] hematuria  Neurologic: [ ] headaches [ ] tremors  [ ] weakness [ ] lightheadedness  Skin: [ ] itching, [ ]burning, [ ] rashes  Endocrine: [ ] heat or cold intolerance  Musculoskeletal: [ ] joint pain or swelling; [ ] muscle, back, or extremity pain  Psychiatric: [ ] depression, [ ]anxiety, [ ]mood swings, or [ ]difficulty sleeping  Hematologic: [ ] easy bruising, [ ] bleeding gums       [ x] All others negative	  [ ] Unable to obtain    Vital Signs Last 24 Hrs  T(C): 36.7 (31 Oct 2019 12:00), Max: 37.4 (31 Oct 2019 09:27)  T(F): 98 (31 Oct 2019 12:00), Max: 99.4 (31 Oct 2019 09:27)  HR: 80 (31 Oct 2019 12:30) (80 - 96)  BP: 93/62 (31 Oct 2019 12:30) (65/39 - 102/60)  BP(mean): 69 (31 Oct 2019 12:30) (58 - 69)  RR: 23 (31 Oct 2019 12:30) (17 - 31)  SpO2: 100% (31 Oct 2019 12:30) (95% - 100%)  I&O's Summary    31 Oct 2019 07:01  -  31 Oct 2019 12:42  --------------------------------------------------------  IN: 278 mL / OUT: 63 mL / NET: 215 mL        PHYSICAL EXAM:  General: No acute distress  HEENT: EOMI, PERRL  Neck: Supple, No JVD  Lungs: Decreased breath sounds to auscultation bilaterally; has wheezing  Heart: Regular rate and rhythm; No murmurs, rubs, or gallops  Abdomen: Nontender, bowel sounds present  Extremities: No clubbing, cyanosis, or edema  Nervous system:  Alert & Oriented X3, no focal deficits  Psychiatric: Normal affect  Skin: No rashes or lesions      LABS:  10-31    141  |  113<H>  |  30<H>  ----------------------------<  108<H>  3.1<L>   |  18<L>  |  2.68<H>    Ca    8.1<L>      31 Oct 2019 12:00    TPro  5.0<L>  /  Alb  2.0<L>  /  TBili  1.2  /  DBili  x   /  AST  18  /  ALT  17  /  AlkPhos  258<H>  10-31    Creatinine Trend: 2.68<--, 2.65<--                        11.6   7.79  )-----------( 69       ( 31 Oct 2019 11:59 )             36.6     PT/INR - ( 31 Oct 2019 07:18 )   PT: 14.1 sec;   INR: 1.26 ratio         PTT - ( 31 Oct 2019 07:18 )  PTT:24.7 sec    Lipid Panel:   Cardiac Enzymes: CARDIAC MARKERS ( 31 Oct 2019 07:18 )  <0.015 ng/mL / x     / x     / x     / x                RADIOLOGY: < from: Xray Chest 1 View- PORTABLE-Urgent (10.31.19 @ 12:30) >  NTERPRETATION:  Chest one view    HISTORY: Central line placement    COMPARISON STUDY: Earlier the same day    Frontal expiratory view of the chest shows the heart to be similar in   size. Right internal jugular line reaches the level of the superior vena   cava. The lungs are clear and there is no evidence of pneumothorax nor   pleural effusion.    IMPRESSION:  No pneumothorax.      < end of copied text >      < from: CT Chest No Cont (10.31.19 @ 08:44) >  No evidence for pneumothorax or pulmonary consolidation.    Small bilateral lung nodules; if the patient is in the high risk category   (i.e. smoker), follow-up chest CT may be pursued in 12 months to ensure   stability.    The appendix appears normal. Colonic diverticulosis without evidence for   diverticulitis. No bowelobstruction or grossly thickened bowel wall.    Mildly enlarged external iliac chain lymph nodes bilaterally measuring up   to 1.3 cm on the right and 1.1 cm on the left.    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    Mild stranding adjacent to the urinary bladder, suggestive of cystitis.    Other findings as above.      < end of copied text >        ECG [my interpretation]:     TELEMETRY:     ECHO: < from: Transthoracic Echocardiogram (04.12.19 @ 09:00) >  1. Normal mitral valve. Mild mitral regurgitation.  2. Calcified, probably trileaflet aortic valve is not well  seen. No aortic stenosis. Mild aortic regurgitation.  3. Normal aortic root.  4. Normal left atrium.  5. Normal left ventricular internal dimensions and wall  thicknesses.  6. Normal Left Ventricular Systolic Function,  (EF =59% by  biplane)  7. Grade I diastolic dysfunction (Impaired relaxation).  8. Normal right atrium.  9. Normal right ventricular size and systolic function  (TAPSE 1.7 cm).  10. Normal tricuspid valve. Trace tricuspid regurgitation.  11. No pericardial effusion.    *** No previous Echo exam.    < end of copied text >      STRESS TEST: < from: Nuclear Stress Test-Pharmacologic (04.12.19 @ 09:00) >  * There is a small, fixed, mild intensity defect in the  mid inferolateral wall that thickens, consistent with  attenuation artifact.  *  Post-stress resting myocardial perfusion gated SPECT  imaging was performed (LVEF = 69 %;LVEDV = 90 ml.)  * Negative study for reversible ischemia    ------------------------------------------------------------------------        < end of copied text >      CATHETERIZATION: CHIEF COMPLAINT: Dyspnea and Abdominal Pain    HPI: 74 y.o. male with CAD with stent (2013), hyperlipidemia, hypertension, borderline DM presents with dyspnea and abdominal pain since yesterday. Pt describes his pain as sharp, 8/10 subsiding now. He complains that it's hard for him to breathe, but denies any chest pains, palpitations, dizziness, headache, nausea or vomiting. This morning he felt perspiring, took 2 baby ASA prompting him to come to ED this morning. He also reports black, hard stools. He is currently in ICU, appears hypoxic, and requires frequent rest periods when talking. He lives with his wife who cooks for him vegetarian diet. He denies any leg edema, retired and not as active as he used to. He takes elevators most of the time, but when he visits his daughter he takes stairs with frequent rest periods due to SOB that the has had for many months. Pt reports taking all medications as prescribed and following up with his doctors as needed.      PAST MEDICAL & SURGICAL HISTORY:  Borderline diabetes  Hyperlipidemia  Obesity (BMI 30-39.9)  CAD (coronary artery disease)  Hypothyroid  Hypertension  After cataract, bilateral  S/P hernia repair: left inguinal  History of kidney stones  H/O total knee replacement, left  H/O heart artery stent: 2013      Allergies    No Known Allergies    Intolerances        MEDICATIONS  (STANDING):  cefepime   IVPB      cefepime   IVPB 2000 milliGRAM(s) IV Intermittent every 12 hours  lactated ringers Bolus 500 milliLiter(s) IV Bolus once  lactated ringers. 1000 milliLiter(s) (100 mL/Hr) IV Continuous <Continuous>  norepinephrine Infusion 0.05 MICROgram(s)/kG/Min (4.392 mL/Hr) IV Continuous <Continuous>  pantoprazole Infusion 8 mG/Hr (10 mL/Hr) IV Continuous <Continuous>  phenylephrine    Infusion 0.1 MICROgram(s)/kG/Min (3.06 mL/Hr) IV Continuous <Continuous>    MEDICATIONS  (PRN):      FAMILY HISTORY:  Family history of hypertension: mother  Family history of diabetes mellitus (Sibling): sister and brothers    No family history of premature coronary artery disease or sudden cardiac death    SOCIAL HISTORY:  Smoking-ex-smoker 15 pack year history   Alcohol-2 shots of whiskey every day  Ilicit Drug use-denies    REVIEW OF SYSTEMS:  Constitutional: [ ] fever, [ ]weight loss,  [ ]fatigue  Eyes: [ ] visual changes  Respiratory: [X ]shortness of breath;  [ ] cough, [ ]wheezing, [ ]chills, [ ]hemoptysis  Cardiovascular: [ ] chest pain, [ ]palpitations, [ ]dizziness,  [ ]leg swelling  Gastrointestinal: [ ] abdominal pain, [ ]nausea, [ ]vomiting,  [ ]diarrhea   Genitourinary: [ ] dysuria, [ ] hematuria  Neurologic: [ ] headaches [ ] tremors  [ ] weakness [ ] lightheadedness  Skin: [ ] itching, [ ]burning, [ ] rashes  Endocrine: [ ] heat or cold intolerance  Musculoskeletal: [ ] joint pain or swelling; [ ] muscle, back, or extremity pain  Psychiatric: [ ] depression, [ ]anxiety, [ ]mood swings, or [ ]difficulty sleeping  Hematologic: [ ] easy bruising, [ ] bleeding gums       [ x] All others negative	  [ ] Unable to obtain    Vital Signs Last 24 Hrs  T(C): 36.7 (31 Oct 2019 12:00), Max: 37.4 (31 Oct 2019 09:27)  T(F): 98 (31 Oct 2019 12:00), Max: 99.4 (31 Oct 2019 09:27)  HR: 80 (31 Oct 2019 12:30) (80 - 96)  BP: 93/62 (31 Oct 2019 12:30) (65/39 - 102/60)  BP(mean): 69 (31 Oct 2019 12:30) (58 - 69)  RR: 23 (31 Oct 2019 12:30) (17 - 31)  SpO2: 100% (31 Oct 2019 12:30) (95% - 100%)  I&O's Summary    31 Oct 2019 07:01  -  31 Oct 2019 12:42  --------------------------------------------------------  IN: 278 mL / OUT: 63 mL / NET: 215 mL        PHYSICAL EXAM:  General: No acute distress  HEENT: EOMI, PERRL  Neck: Supple, No JVD  Lungs: Decreased breath sounds to auscultation bilaterally; has wheezing  Heart: Regular rate and rhythm; No murmurs, rubs, or gallops  Abdomen: Nontender, bowel sounds present  Extremities: No clubbing, cyanosis, or edema  Nervous system:  Alert & Oriented X3, no focal deficits  Psychiatric: Normal affect  Skin: No rashes or lesions      LABS:  10-31    141  |  113<H>  |  30<H>  ----------------------------<  108<H>  3.1<L>   |  18<L>  |  2.68<H>    Ca    8.1<L>      31 Oct 2019 12:00    TPro  5.0<L>  /  Alb  2.0<L>  /  TBili  1.2  /  DBili  x   /  AST  18  /  ALT  17  /  AlkPhos  258<H>  10-31    Creatinine Trend: 2.68<--, 2.65<--                        11.6   7.79  )-----------( 69       ( 31 Oct 2019 11:59 )             36.6     PT/INR - ( 31 Oct 2019 07:18 )   PT: 14.1 sec;   INR: 1.26 ratio         PTT - ( 31 Oct 2019 07:18 )  PTT:24.7 sec    Lipid Panel:   Cardiac Enzymes: CARDIAC MARKERS ( 31 Oct 2019 07:18 )  <0.015 ng/mL / x     / x     / x     / x                RADIOLOGY: < from: Xray Chest 1 View- PORTABLE-Urgent (10.31.19 @ 12:30) >  NTERPRETATION:  Chest one view    HISTORY: Central line placement    COMPARISON STUDY: Earlier the same day    Frontal expiratory view of the chest shows the heart to be similar in   size. Right internal jugular line reaches the level of the superior vena   cava. The lungs are clear and there is no evidence of pneumothorax nor   pleural effusion.    IMPRESSION:  No pneumothorax.      < end of copied text >      < from: CT Chest No Cont (10.31.19 @ 08:44) >  No evidence for pneumothorax or pulmonary consolidation.    Small bilateral lung nodules; if the patient is in the high risk category   (i.e. smoker), follow-up chest CT may be pursued in 12 months to ensure   stability.    The appendix appears normal. Colonic diverticulosis without evidence for   diverticulitis. No bowelobstruction or grossly thickened bowel wall.    Mildly enlarged external iliac chain lymph nodes bilaterally measuring up   to 1.3 cm on the right and 1.1 cm on the left.    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    Mild stranding adjacent to the urinary bladder, suggestive of cystitis.    Other findings as above.      < end of copied text >        ECG [my interpretation]:  < from: 12 Lead ECG (10.31.19 @ 07:27) >   Normal sinus rhythm  Septal infarct , age undetermined  Abnormal ECG    < end of copied text >      TELEMETRY: HR 70-90's, no events.     ECHO: < from: Transthoracic Echocardiogram (04.12.19 @ 09:00) >  1. Normal mitral valve. Mild mitral regurgitation.  2. Calcified, probably trileaflet aortic valve is not well  seen. No aortic stenosis. Mild aortic regurgitation.  3. Normal aortic root.  4. Normal left atrium.  5. Normal left ventricular internal dimensions and wall  thicknesses.  6. Normal Left Ventricular Systolic Function,  (EF =59% by  biplane)  7. Grade I diastolic dysfunction (Impaired relaxation).  8. Normal right atrium.  9. Normal right ventricular size and systolic function  (TAPSE 1.7 cm).  10. Normal tricuspid valve. Trace tricuspid regurgitation.  11. No pericardial effusion.    *** No previous Echo exam.    < end of copied text >      STRESS TEST: < from: Nuclear Stress Test-Pharmacologic (04.12.19 @ 09:00) >  * There is a small, fixed, mild intensity defect in the  mid inferolateral wall that thickens, consistent with  attenuation artifact.  *  Post-stress resting myocardial perfusion gated SPECT  imaging was performed (LVEF = 69 %;LVEDV = 90 ml.)  * Negative study for reversible ischemia    ------------------------------------------------------------------------        < end of copied text >      CATHETERIZATION: CHIEF COMPLAINT: Dyspnea and Abdominal Pain    HPI: 74 y.o. male with CAD with stent (2013), hyperlipidemia, hypertension, borderline DM presents with dyspnea and abdominal pain since yesterday. Pt describes his pain as sharp, 8/10 subsiding now. He complains that it's hard for him to breathe, but denies any chest pains, palpitations, dizziness, headache, nausea or vomiting. This morning he felt perspiring, took 2 baby ASA prompting him to come to ED this morning. He also reports black, hard stools. He is currently in ICU, appears hypoxic, and requires frequent rest periods when talking. He lives with his wife who cooks for him vegetarian diet. He denies any leg edema, retired and not as active as he used to. He takes elevators most of the time, but when he visits his daughter he takes stairs with frequent rest periods due to SOB that the has had for many months. Pt reports taking all medications as prescribed and following up with his doctors as needed.      PAST MEDICAL & SURGICAL HISTORY:  Borderline diabetes  Hyperlipidemia  Obesity (BMI 30-39.9)  CAD (coronary artery disease)  Hypothyroid  Hypertension  After cataract, bilateral  S/P hernia repair: left inguinal  History of kidney stones  H/O total knee replacement, left  H/O heart artery stent: 2013      Allergies    No Known Allergies    MEDICATIONS  (STANDING):  cefepime   IVPB      cefepime   IVPB 2000 milliGRAM(s) IV Intermittent every 12 hours  lactated ringers Bolus 500 milliLiter(s) IV Bolus once  lactated ringers. 1000 milliLiter(s) (100 mL/Hr) IV Continuous <Continuous>  norepinephrine Infusion 0.05 MICROgram(s)/kG/Min (4.392 mL/Hr) IV Continuous <Continuous>  pantoprazole Infusion 8 mG/Hr (10 mL/Hr) IV Continuous <Continuous>  phenylephrine    Infusion 0.1 MICROgram(s)/kG/Min (3.06 mL/Hr) IV Continuous <Continuous>    MEDICATIONS  (PRN):      FAMILY HISTORY:  Family history of hypertension: mother  Family history of diabetes mellitus (Sibling): sister and brothers    No family history of premature coronary artery disease or sudden cardiac death    SOCIAL HISTORY:  Smoking-ex-smoker 15 pack year history   Alcohol-2 shots of whiskey every day  [employer] Drug use-denies    REVIEW OF SYSTEMS:  Constitutional: [ ] fever, [ ]weight loss,  [ ]fatigue  Eyes: [ ] visual changes  Respiratory: [X ]shortness of breath;  [ ] cough, [ ]wheezing, [ ]chills, [ ]hemoptysis  Cardiovascular: [ ] chest pain, [ ]palpitations, [ ]dizziness,  [ ]leg swelling  Gastrointestinal: [ ] abdominal pain, [ ]nausea, [ ]vomiting,  [ ]diarrhea   Genitourinary: [ ] dysuria, [ ] hematuria  Neurologic: [ ] headaches [ ] tremors  [ ] weakness [ ] lightheadedness  Skin: [ ] itching, [ ]burning, [ ] rashes  Endocrine: [ ] heat or cold intolerance  Musculoskeletal: [ ] joint pain or swelling; [ ] muscle, back, or extremity pain  Psychiatric: [ ] depression, [ ]anxiety, [ ]mood swings, or [ ]difficulty sleeping  Hematologic: [ ] easy bruising, [ ] bleeding gums       [ x] All others negative	  [ ] Unable to obtain    Vital Signs Last 24 Hrs  T(C): 36.7 (31 Oct 2019 12:00), Max: 37.4 (31 Oct 2019 09:27)  T(F): 98 (31 Oct 2019 12:00), Max: 99.4 (31 Oct 2019 09:27)  HR: 80 (31 Oct 2019 12:30) (80 - 96)  BP: 93/62 (31 Oct 2019 12:30) (65/39 - 102/60)  BP(mean): 69 (31 Oct 2019 12:30) (58 - 69)  RR: 23 (31 Oct 2019 12:30) (17 - 31)  SpO2: 100% (31 Oct 2019 12:30) (95% - 100%)  I&O's Summary    31 Oct 2019 07:01  -  31 Oct 2019 12:42  --------------------------------------------------------  IN: 278 mL / OUT: 63 mL / NET: 215 mL        PHYSICAL EXAM:  General: No acute distress  HEENT: EOMI, PERRL  Neck: Supple, No JVD  Lungs: Decreased breath sounds to auscultation bilaterally; has wheezing  Heart: Regular rate and rhythm; No murmurs, rubs, or gallops  Abdomen: Nontender, bowel sounds present  Extremities: No clubbing, cyanosis, or edema  Nervous system:  Alert & Oriented X3, no focal deficits  Psychiatric: Normal affect  Skin: No rashes or lesions      LABS:  10-31    141  |  113<H>  |  30<H>  ----------------------------<  108<H>  3.1<L>   |  18<L>  |  2.68<H>    Ca    8.1<L>      31 Oct 2019 12:00    TPro  5.0<L>  /  Alb  2.0<L>  /  TBili  1.2  /  DBili  x   /  AST  18  /  ALT  17  /  AlkPhos  258<H>  10-31    Creatinine Trend: 2.68<--, 2.65<--                        11.6   7.79  )-----------( 69       ( 31 Oct 2019 11:59 )             36.6     PT/INR - ( 31 Oct 2019 07:18 )   PT: 14.1 sec;   INR: 1.26 ratio         PTT - ( 31 Oct 2019 07:18 )  PTT:24.7 sec    Lipid Panel:   Cardiac Enzymes: CARDIAC MARKERS ( 31 Oct 2019 07:18 )  <0.015 ng/mL / x     / x     / x     / x          RADIOLOGY: < from: Xray Chest 1 View- PORTABLE-Urgent (10.31.19 @ 12:30) >  NTERPRETATION:  Chest one view    HISTORY: Central line placement    COMPARISON STUDY: Earlier the same day    Frontal expiratory view of the chest shows the heart to be similar in   size. Right internal jugular line reaches the level of the superior vena   cava. The lungs are clear and there is no evidence of pneumothorax nor   pleural effusion.    IMPRESSION:  No pneumothorax.      < end of copied text >      < from: CT Chest No Cont (10.31.19 @ 08:44) >  No evidence for pneumothorax or pulmonary consolidation.    Small bilateral lung nodules; if the patient is in the high risk category   (i.e. smoker), follow-up chest CT may be pursued in 12 months to ensure   stability.    The appendix appears normal. Colonic diverticulosis without evidence for   diverticulitis. No bowelobstruction or grossly thickened bowel wall.    Mildly enlarged external iliac chain lymph nodes bilaterally measuring up   to 1.3 cm on the right and 1.1 cm on the left.    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    Mild stranding adjacent to the urinary bladder, suggestive of cystitis.    Other findings as above.      < end of copied text >        ECG [my interpretation]:  < from: 12 Lead ECG (10.31.19 @ 07:27) >   Normal sinus rhythm  Septal infarct , age undetermined  Abnormal ECG    < end of copied text >      TELEMETRY: HR 70-90's, no events.     ECHO: < from: Transthoracic Echocardiogram (04.12.19 @ 09:00) >  1. Normal mitral valve. Mild mitral regurgitation.  2. Calcified, probably trileaflet aortic valve is not well  seen. No aortic stenosis. Mild aortic regurgitation.  3. Normal aortic root.  4. Normal left atrium.  5. Normal left ventricular internal dimensions and wall  thicknesses.  6. Normal Left Ventricular Systolic Function,  (EF =59% by  biplane)  7. Grade I diastolic dysfunction (Impaired relaxation).  8. Normal right atrium.  9. Normal right ventricular size and systolic function  (TAPSE 1.7 cm).  10. Normal tricuspid valve. Trace tricuspid regurgitation.  11. No pericardial effusion.    *** No previous Echo exam.    < end of copied text >      STRESS TEST: < from: Nuclear Stress Test-Pharmacologic (04.12.19 @ 09:00) >  * There is a small, fixed, mild intensity defect in the  mid inferolateral wall that thickens, consistent with  attenuation artifact.  *  Post-stress resting myocardial perfusion gated SPECT  imaging was performed (LVEF = 69 %;LVEDV = 90 ml.)  * Negative study for reversible ischemia    ------------------------------------------------------------------------        < end of copied text >

## 2019-10-31 NOTE — CONSULT NOTE ADULT - RS GEN PE MLT RESP DETAILS PC
no wheezes/clear to auscultation bilaterally/no rales/no rhonchi/breath sounds equal/good air movement

## 2019-10-31 NOTE — H&P ADULT - RS GEN PE MLT RESP DETAILS PC
breath sounds equal/no rhonchi/clear to auscultation bilaterally/airway patent/respirations non-labored/no rales

## 2019-10-31 NOTE — H&P ADULT - NSHPPHYSICALEXAM_GEN_ALL_CORE
Vital Signs Last 24 Hrs  T(C): --  T(F): --  HR: 90 (31 Oct 2019 08:53) (90 - 96)  BP: 87/60 (31 Oct 2019 08:53) (65/39 - 102/60)  RR: 24 (31 Oct 2019 08:53) (24 - 26)  SpO2: 100% (31 Oct 2019 08:53) (95% - 100%)

## 2019-10-31 NOTE — H&P ADULT - NSICDXPASTMEDICALHX_GEN_ALL_CORE_FT
PAST MEDICAL HISTORY:  Borderline diabetes     CAD (coronary artery disease)     Hyperlipidemia     Hypertension     Hypothyroid     Obesity (BMI 30-39.9)

## 2019-10-31 NOTE — ED ADULT TRIAGE NOTE - CCCP TRG CHIEF CMPLNT
hypotension/notification /hypotensive/black stools/abd distention hypotension/notification /[id number]/black stools/abd distention

## 2019-10-31 NOTE — H&P ADULT - NSICDXPASTSURGICALHX_GEN_ALL_CORE_FT
PAST SURGICAL HISTORY:  After cataract, bilateral     H/O heart artery stent 2013    H/O total knee replacement, left     History of kidney stones     S/P hernia repair left inguinal

## 2019-10-31 NOTE — ED PROVIDER NOTE - OBJECTIVE STATEMENT
74 year old male PMH HTN, HLD, CAD with 1 stent, hypothyroid coming in with 1 days of melanotic stools and LLQ abd pain. states that also feels SOB 2/2 abdominal pain. Denies fevers, chills, sweats, cough, palpitation, urinary complaints, back pains. had colonoscopy/endoscopy 2 years ago which only showed  hemorrhoids. Never had this before. takes aspirin but no other blood thinners.

## 2019-10-31 NOTE — ED PROVIDER NOTE - CARE PLAN
Principal Discharge DX:	Hypotension  Secondary Diagnosis:	Anemia  Secondary Diagnosis:	Abdominal pain  Secondary Diagnosis:	HALEY (acute kidney injury)

## 2019-10-31 NOTE — ED ADULT NURSE NOTE - OBJECTIVE STATEMENT
Pt was BIB EMS for notification of GI bleed. Pt has 2 days of black stool with left abd pain and chest pain. As per EMS, pt was hypotensive in EMS, Fluid Bolus given in route. O2. placed in route and continued on arrival, 100% sat. AxO3. HX of CVA, HLD, HTN, DM. Pt denied allergies. Pt placed on bedside cardiac monitor. Sinus Rhythm. All safety precautions in place.

## 2019-10-31 NOTE — CONSULT NOTE ADULT - ASSESSMENT
74 year old male with septic shock secondary to UTI and obstructing right ureteral calculi, HALEY, hx congenital atrophic right kidney. Pressor x1, lactic acidosis. Leukopenia/anemia improved s/p 3U PRBC, Thrombocytopenia, 1U platelets pending. Fecal occult blood +    - recommend IR for placement of urgent right nephrostomy tube  - continue IV antibiotics  - urine and blood cultures pending, will follow up results  - continue to trend BUN/Cr  - continue leslie catheter, monitor urine output  - continue IVF, repeat lactate  - continue pressor support per ICU  - continue ICU care  - discussed with Dr. Meza

## 2019-10-31 NOTE — CONSULT NOTE ADULT - SUBJECTIVE AND OBJECTIVE BOX
HPI:  Pt is as 75y/o M, from home, lives w/ wife, with PMHx of CAD s/p 1 stent, pre Diabetes, HLD, HTN, Hypothyroidism, congenital atrophic Rt kidney, Renal Stones, and Obesity who presents to ED with Abdominal pain x 2 days. Pt states yesterday he developed diffuse, 8/10, constant, sharp, non-radiating pain exacerbated when flat, and improved when sitting forward.  Pt reports assoc SOB 2/2 pain.  Pt states pain started when sitting on couch watching TV.  He took two baby asa with no relief.  Pt denies similar pain in the past, and states he suffers from chronic constipation- last BM yesterday evening appeared dark.  Pt denies diarrhea, but states he had 2 episodes of dark formed stool since yesterday.  Pt last took his metoprolol yesterday. Pt denies fever, chills, nausea, vomiting, HA, dizziness, recent illness, sick contacts, recent antibiotic use, or NSAID exposure.  Pt states he underwent colonoscopy/ endoscopy 2 years prior which was only significant for hemorrhoids. Pt denies CP, palpitations, rash, extremity edema, cough, weakness, numbness, or syncope.     Patient seen and examined at bedside for urology consult due to urosepsis with findings of distal right ureteral stones. Patients family present at bedside and assists with history. Patient states that he had a history of kidney stones in the past on the right side which required a procedure. Admits to dysuria with pain and burning and purulent urine. Admits to frequency and decreased stream. Denies fever, chills. Patient presented to Ed due to dark BM with abdominal pain and dizziness with hypotension. Patient has history of congenital atrophic right kidney which per family has no function.     PAST MEDICAL & SURGICAL HISTORY:  Borderline diabetes  Hyperlipidemia  Obesity (BMI 30-39.9)  CAD (coronary artery disease)  Hypothyroid  Hypertension  After cataract, bilateral  S/P hernia repair: left inguinal  History of kidney stones  H/O total knee replacement, left  H/O heart artery stent:     Review of Systems: Contained within HPI    MEDICATIONS  (STANDING):  cefepime   IVPB      cefepime   IVPB 2000 milliGRAM(s) IV Intermittent every 12 hours  lactated ringers Bolus 500 milliLiter(s) IV Bolus once  lactated ringers. 1000 milliLiter(s) (100 mL/Hr) IV Continuous <Continuous>  norepinephrine Infusion 0.05 MICROgram(s)/kG/Min (4.392 mL/Hr) IV Continuous <Continuous>  pantoprazole Infusion 8 mG/Hr (10 mL/Hr) IV Continuous <Continuous>  phenylephrine    Infusion 0.1 MICROgram(s)/kG/Min (3.06 mL/Hr) IV Continuous <Continuous>    Allergies: No Known Allergies    FAMILY HISTORY:  Family history of hypertension: mother  Family history of diabetes mellitus (Sibling): sister and bothers    Vital Signs Last 24 Hrs  T(C): 36.7 (31 Oct 2019 12:00), Max: 37.4 (31 Oct 2019 09:27)  T(F): 98 (31 Oct 2019 12:00), Max: 99.4 (31 Oct 2019 09:27)  HR: 80 (31 Oct 2019 12:30) (80 - 96)  BP: 93/62 (31 Oct 2019 12:30) (65/39 - 102/60)  BP(mean): 69 (31 Oct 2019 12:30) (58 - 69)  RR: 23 (31 Oct 2019 12:30) (17 - 31)  SpO2: 100% (31 Oct 2019 12:30) (95% - 100%)    Physical Exam:    General:  Appears stated age, well-groomed, well-nourished, no distress  Eyes: EOMI  HENT:  WNL, no JVD  Chest: respirations nonlabored  Abdomen: soft, mild tenderness to palpation, mild distention    : no suprapubic tenderness, leslie catheter in place with dark yellow/cloudy urine in bag.   Extremities: no edema bilaterally  Skin: warm and dry  Musculoskeletal: no calf tenderness  Neuro:  Alert, oriented to time, place and person   Psych: normal affect    LABS:                        11.6   7.79  )-----------( 69       ( 31 Oct 2019 11:59 )             36.6     10    141  |  112<H>  |  31<H>  ----------------------------<  90  3.1<L>   |  17<L>  |  2.65<H>    Ca    8.6      31 Oct 2019 07:18    TPro  5.0<L>  /  Alb  2.0<L>  /  TBili  1.2  /  DBili  x   /  AST  18  /  ALT  17  /  AlkPhos  258<H>  10-31    PT/INR - ( 31 Oct 2019 07:18 )   PT: 14.1 sec;   INR: 1.26 ratio       PTT - ( 31 Oct 2019 07:18 )  PTT:24.7 sec    Occult Blood, Feces: Positive (10.31.19 @ 07:18)    Lactate, Blood: 3.8: TYPE:(C=Critical, N=Notification, A=Abnormal) 	N  TESTS: LACTATE  DATE/TIME CALLED: 10/31/19 12:33  CALLED TO: DR. SAMANTHA SUNSHINE  READ BACK (2 Patient Identifiers)(Y/N): Y  READ BACK VALUES (Y/N): Y  CALLED BY: STEVE 10/31/19 12:34  ***Floor notified as per CMS Sepsis/Septic Shock protocol*** mmol/L (10.31.19 @ 11:57)    Urinalysis Basic - ( 31 Oct 2019 08:51 )    Color: Yellow / Appearance: very cloudy / S.015 / pH: x  Gluc: x / Ketone: Trace  / Bili: Negative / Urobili: 1   Blood: x / Protein: 500 mg/dL / Nitrite: Positive   Leuk Esterase: Moderate / RBC: 10-25 /HPF / WBC >50 /HPF   Sq Epi: x / Non Sq Epi: Negative /HPF / Bacteria: Many /HPF    RADIOLOGY & ADDITIONAL STUDIES:  < from: CT Chest No Cont (10.31.19 @ 08:44) >  EXAM:  CT ABDOMEN AND PELVIS                          EXAM:  CT CHEST                          PROCEDURE DATE:  10/31/2019      INTERPRETATION:  CT of the chest, abdomen, and pelvis without IV contrast    Indication: GI bleed.    Technique: Axial multidetector CT images of the chest, abdomen, and   pelvis are acquired without IV contrast.    Comparison: None.    Findings:    Chest: No pleural or pericardial effusion. The heart is not enlarged.   Aortic and coronary artery calcifications are present. Mild ectasia of   the ascending aorta at 3.7 cm in diameter. 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 Allowing for the noncontrast technique, there is no   grossly enlarged mediastinal, hilar, or axillary lymph node.    The trachea and central bronchi are patent.    No evidence for pneumothorax or pulmonary consolidation. Small bilateral   atelectasis. Nonspecific 4 mm subpleural right middle lobe lung nodule   (image 77 series 2). Nonspecific 4 mm subpleural left lower lobe lung   nodule (image 25 series 2). If the patient is in the high risk category   (i.e. smoker), follow-up chest CT may be pursued in 12 months to ensure   stability.    Abdomen/pelvis: Evaluation of the abdomen and pelvis is limited by lack   of IV and oral contrast. Allowing for the noncontrast technique, the   liver, common bile duct, pancreas, and spleen appear grossly   unremarkable. Nonspecific mild adrenal thickening bilaterally.   Cholecystectomy clips are present.    There are 2 right distal ureteral calculi measuring up to 1.2 cm with   associated moderate to severe right hydroureteronephrosis. The right   kidney is atrophic. Small hypodense lesion in the right kidney,   representing a probable cyst. There is a small nonobstructive calculus in   the left kidney. No evidence for a calculus in the left ureter. There is   compensatory hypertrophy of the left kidney. Small hypodense lesion in   the left kidney, representing a probable cyst. No left hydronephrosis.    The appendix appears normal. Colonic diverticulosis without evidence for   diverticulitis. Nonspecific submucosal fat deposition of the ascending   colon. No bowel obstruction or grossly thickened bowel wall.    Small fat-containing periumbilical hernia.     No evidence for free air, or ascites. Mildly enlarged external iliac   chain lymph nodes bilaterally measuring up to 1.3 cm on the right and 1.1   cm on the left.     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    Leslie catheter in the urinary bladder. The urinary bladder is collapsed,   rendering evaluation of the urinary bladder wall difficult.  Mild   stranding adjacent to the urinary bladder, suggestive of cystitis. The   prostate and seminal vesicles appear grossly unremarkable.    Degenerative spondylosis. 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    Impression:    No evidence for pneumothorax or pulmonary consolidation.    Small bilateral lung nodules; if the patient is in the high risk category   (i.e. smoker), follow-up chest CT may be pursued in 12 months to ensure   stability.    The appendix appears normal. Colonic diverticulosis without evidence for   diverticulitis. No bowelobstruction or grossly thickened bowel wall.    Mildly enlarged external iliac chain lymph nodes bilaterally measuring up   to 1.3 cm on the right and 1.1 cm on the left.    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    Mild stranding adjacent to the urinary bladder, suggestive of cystitis.    Other findings as above.    SUZETTE GOMEZ M.D., ATTENDING RADIOLOGIST  This document has been electronicallysigned. Oct 31 2019  9:27AM    < end of copied text >

## 2019-10-31 NOTE — H&P ADULT - HISTORY OF PRESENT ILLNESS
Pt is as 73y/o M, from home, lives w/ wife, with PMHx of CAD s/p 1 stent , pre Diabetes, HLD, HTN, Hypothyroidism, congenital atrophic Rt kidney, Renal Stones, and Obesity who presents to ED with Abdominal pain x 2 days.  Pt states yesterday he developed diffuse, 8/10, constant, sharp, non-radiating pain exacerbated when flat, and improved when sitting forward.  Pt reports assoc SOB 2/2 pain.  Pt states pain started when sitting on couch watching TV.  He took two baby asa with no relief.  Pt denies similar pain in the past, and states he suffers from chronic constipation- last BM yesterday evening appeared dark.  Pt denies diarrhea, but states he had 2 episodes of dark formed stool since yesterday.  Pt last took his metoprolol yesterday. Pt denies fever, chills, nausea, vomiting, HA, dizziness, recent illness, sick contacts, recent antibiotic use, or NSAID exposure.  Pt states he underwent colonoscopy/ endoscopy 2 years prior which was only significant for hemorrhoids.  Pt denies CP,  palpitations, rash, extremity edema, cough, weakness, numbness, or syncope.     In the ED, pt presents in mod painful distress, vitals sig for BP 87/51, HR 96,  remaining vitals WNL, and EKG NSR Pt is as 75y/o M, from home, lives w/ wife, with PMHx of CAD s/p 1 stent, pre Diabetes, HLD, HTN, Hypothyroidism, congenital atrophic Rt kidney, Renal Stones, and Obesity who presents to ED with Abdominal pain x 2 days.  Pt states yesterday he developed diffuse, 8/10, constant, sharp, non-radiating pain exacerbated when flat, and improved when sitting forward.  Pt reports assoc SOB 2/2 pain.  Pt states pain started when sitting on couch watching TV.  He took two baby asa with no relief.  Pt denies similar pain in the past, and states he suffers from chronic constipation- last BM yesterday evening appeared dark.  Pt denies diarrhea, but states he had 2 episodes of dark formed stool since yesterday.  Pt last took his metoprolol yesterday. Pt denies fever, chills, nausea, vomiting, HA, dizziness, recent illness, sick contacts, recent antibiotic use, or NSAID exposure.  Pt states he underwent colonoscopy/ endoscopy 2 years prior which was only significant for hemorrhoids.  Pt denies CP,  palpitations, rash, extremity edema, cough, weakness, numbness, or syncope.     In the ED, pt presents in mod painful distress, vitals sig for BP 87/51, HR 96,  remaining vitals WNL, and EKG NSR

## 2019-10-31 NOTE — CONSULT NOTE ADULT - ASSESSMENT
Septic Shock - on 1 pressors still  Right Pyelonephritis - secondary to obstructing stones  Leukopenia - WBC has now normalized    Plan - decrease Maxipime to 1gm iv q12 hrs based on cr cl.  for nephrostomy tube placement.

## 2019-10-31 NOTE — H&P ADULT - ASSESSMENT
Pt is as 75y/o M, from home, lives w/ wife, with PMHx of CAD s/p 1 stent , pre Diabetes, HLD, HTN, Hypothyroidism, congenital atrophic Rt kidney, Renal Stones, and Obesity who presents to ED with Abdominal pain x 2 days.  Pt states yesterday he developed diffuse, 8/10, constant, sharp, non-radiating pain exacerbated when flat, and improved when sitting forward.  Pt reports assoc SOB 2/2 pain.  Pt states pain started when sitting on couch watching TV.  He took two baby asa with no relief.  Pt denies similar pain in the past, and states he suffers from chronic constipation- last BM yesterday evening appeared dark.  Pt denies diarrhea, but states he had 2 episodes of dark formed stool since yesterday.  Pt last took his metoprolol yesterday. Pt denies fever, chills, nausea, vomiting, HA, dizziness, recent illness, sick contacts, recent antibiotic use, or NSAID exposure.  Pt states he underwent colonoscopy/ endoscopy 2 years prior which was only significant for hemorrhoids.  Pt denies CP,  palpitations, rash, extremity edema, cough, weakness, numbness, or syncope.     In the ED, pt presents in mod painful distress, vitals sig for BP 87/51, HR 96,  remaining vitals WNL, and EKG NSR.  Pt presents pancytopenic with WBC 1.59, PLt __, and Hb 9.7 (baseline 12).  Chest xray appears clear.  UA positive.  CT chest and abdomen performed concerning for     Assessment:        Plan:  Neuro:          CV:      Pulm:      ID:      Nephro:      GI:      Heme:      Endo:  - target CBG < 180    Prophy:  - Heparin S/C    Dispo:  - monitor in the ICU. Prognosis guarded based on hemodynamic status    [facility]   FULL CODE Pt is as 75y/o M, from home, lives w/ wife, with PMHx of CAD s/p 1 stent , pre Diabetes, HLD, HTN, Hypothyroidism, congenital atrophic Rt kidney, Renal Stones, and Obesity who presents to ED with Abdominal pain x 2 days.  Pt states yesterday he developed diffuse, 8/10, constant, sharp, non-radiating pain exacerbated when flat, and improved when sitting forward.  Pt reports assoc SOB 2/2 pain.  Pt states pain started when sitting on couch watching TV.  He took two baby asa with no relief.  Pt denies similar pain in the past, and states he suffers from chronic constipation- last BM yesterday evening appeared dark.  Pt denies diarrhea, but states he had 2 episodes of dark formed stool since yesterday.  Pt last took his metoprolol yesterday. Pt denies fever, chills, nausea, vomiting, HA, dizziness, recent illness, sick contacts, recent antibiotic use, or NSAID exposure.  Pt states he underwent colonoscopy/ endoscopy 2 years prior which was only significant for hemorrhoids.  Pt denies CP,  palpitations, rash, extremity edema, cough, weakness, numbness, or syncope.     In the ED, pt presents in mod painful distress, vitals sig for BP 87/51, HR 96,  remaining vitals WNL, and EKG NSR.  Pt presents pancytopenic with WBC 1.59, PLt __, and Hb 9.7 (baseline 12).  Chest xray appears clear.  UA positive.  CT chest and abdomen performed concerning for     Pt will be admitted to ICU with concern for Septic Shock and GI bleed     Assessment:      Plan:  Neuro:  -Currently AO x 3 at baseline mental status   -Moderate pain- will continue low dose dilaudid in setting of HALEY and hypotension       CV:  -Septic Shock requiring to pressor support- will continue to monitor hemodynamic stability and taper pressor as indicated    s/p 3L NS in ED, started on low dose phenylephrine- taper as tolerated to maintain MAP of 65  -HTN- Hold antihypertensives in the setting of shock- resume as clinically indicated   -HLD- c/w statin therapy       Pulm:  -Chest CT neg for acute pulmonary process   -Mild SOB 2/2 pain - saturating 100% on 2L ncann  -will de-escalate O2 as tolerated        ID:      Nephro:      GI:      Heme:      Endo:  - target CBG < 180    Prophy:  - Heparin S/C    Dispo:  - monitor in the ICU. Prognosis guarded based on hemodynamic status    [facility]   FULL CODE Pt is as 73y/o M, from home, lives w/ wife, with PMHx of CAD s/p 1 stent , pre Diabetes, HLD, HTN, Hypothyroidism, congenital atrophic Rt kidney, Renal Stones, and Obesity who presents to ED with Abdominal pain x 2 days.  Pt states yesterday he developed diffuse, 8/10, constant, sharp, non-radiating pain exacerbated when flat, and improved when sitting forward.  Pt reports assoc SOB 2/2 pain.  Pt states pain started when sitting on couch watching TV.  He took two baby asa with no relief.  Pt denies similar pain in the past, and states he suffers from chronic constipation- last BM yesterday evening appeared dark.  Pt denies diarrhea, but states he had 2 episodes of dark formed stool since yesterday.  Pt last took his metoprolol yesterday. Pt denies fever, chills, nausea, vomiting, HA, dizziness, recent illness, sick contacts, recent antibiotic use, or NSAID exposure.  Pt states he underwent colonoscopy/ endoscopy 2 years prior which was only significant for hemorrhoids.  Pt denies CP,  palpitations, rash, extremity edema, cough, weakness, numbness, or syncope.     In the ED, pt presents in mod painful distress, vitals sig for BP 87/51, HR 96,  remaining vitals WNL, and EKG NSR.  Pt presents pancytopenic with WBC 1.59, PLt 82, and Hb 9.7 (baseline 12).  Chest xray appears clear.  UA positive- Urine appears purulent.  CT chest and abdomen performed concerning for mod to severe rt hydroureteronephrosis with obstructing ureteral calculi, cystitis, and external iliac lymphadenopathy.    Pt will be admitted to ICU with concern for Septic Shock and GI bleed     Assessment:  -Septic Shock due to UTI  -Ureteral obstruction  -Anemia   -HALEY on CKD  -Pancytopenia       Plan:  Neuro:  -Currently AO x 3 at baseline mental status   -Moderate pain- will continue low dose Dilaudid in setting of HALEY and hypotension       CV:  -Septic Shock requiring to pressor support- will continue to monitor hemodynamic stability and taper pressor as indicated    s/p 3L NS in ED, started on low dose phenylephrine, will switch to Levophed when central line is placed- taper as tolerated to maintain MAP of 65   will place central line for pressor support   -HTN- Hold antihypertensives in the setting of shock- resume as clinically indicated   -HLD- c/w statin therapy   -CAD- Hold asa in setting of thrombocytopenia   -Cardio Consulted: Dr. Fonseca     Pulm:  -Chest CT neg for acute pulmonary process  -Mild SOB 2/2 pain - saturating 100% on 2L ncann  -will de-escalate O2 as tolerated        ID:  -Septic Shock 2/2 UTI requiring pressor support  -c/w tapering dose of levophed to maintain MAP > 65  -s/p 1g Rocephin; will start on IV cefepime empirically  -UA positive with purulent Urine  -f/u Blood and Urine cultures    Nephro:  -HALEY on CKD stage 2; creat 2.6 on admission (baseline 1.5)   likely pre-renal from dehydration in the setting of sepsis   s/p 3L NS in ED   will repeat BMP, and send urine lytes    -Rt hydroureteronephrosis with ureteral stones   s/p 1g Rocephin in ED, will continue IV Cefepime empirically   Pt will likely need stent placement to relieve ureteral obs   Urology consulted: Dr. Meza     GI:  -Abdominal pain- likely 2/2 UTI   CT abdomen negative for acute intra-abdominal process   Pt has mild fecal impaction    -Melanotic Stool- Occult pos, but no active bleeding during admission   drop in Hb to 9.7 on admission (baseline 12)- concern for GI bleed   s/p 2u pRBC's in ED on admission- will follow up post transfusion CBC   Protonix 40mg q12 for now   NPO for now    GI consulted- Dr. Reyna     Heme:  -Pancytopenia on admission likely 2/2 sepsis UTI   c/w antibiotic regimen and monitor CBC daily     -Anemia   drop in Hb to 9.7 on admission (baseline 12)- concern for GI bleed   s/p 2u pRBC's in ED on admission- will follow up post transfusion CBC, will send anemia studies in AM   Protonix 40mg q12 for now   NPO for now    GI consulted- Dr. Reyna      Endo:  -target CBG < 180    Prophy:  - Hold chemical DVT prophy in setting of anemia and thrombocytopenia     Dispo:  - monitor in the ICU. Prognosis guarded based on hemodynamic status    [facility]   FULL CODE Pt is as 75y/o M, from home, lives w/ wife, with PMHx of CAD s/p 1 stent , pre Diabetes, HLD, HTN, Hypothyroidism, congenital atrophic Rt kidney, Renal Stones, and Obesity who presents to ED with Abdominal pain x 2 days.  Pt states yesterday he developed diffuse, 8/10, constant, sharp, non-radiating pain exacerbated when flat, and improved when sitting forward.  Pt reports assoc SOB 2/2 pain.  Pt states pain started when sitting on couch watching TV.  He took two baby asa with no relief.  Pt denies similar pain in the past, and states he suffers from chronic constipation- last BM yesterday evening appeared dark.  Pt denies diarrhea, but states he had 2 episodes of dark formed stool since yesterday.  Pt last took his metoprolol yesterday. Pt denies fever, chills, nausea, vomiting, HA, dizziness, recent illness, sick contacts, recent antibiotic use, or NSAID exposure.  Pt states he underwent colonoscopy/ endoscopy 2 years prior which was only significant for hemorrhoids.  Pt denies CP,  palpitations, rash, extremity edema, cough, weakness, numbness, or syncope.     In the ED, pt presents in mod painful distress, vitals sig for BP 87/51, HR 96,  remaining vitals WNL, and EKG NSR.  Pt presents pancytopenic with WBC 1.59, PLt 82, and Hb 9.7 (baseline 12).  Chest xray appears clear.  UA positive- Urine appears purulent.  CT chest and abdomen performed concerning for mod to severe rt hydroureteronephrosis with obstructing ureteral calculi, cystitis, and external iliac lymphadenopathy.    Pt will be admitted to ICU with concern for Septic Shock and GI bleed     Assessment:  -Septic Shock due to UTI  -Ureteral obstruction  -Anemia   -HALEY on CKD  -Pancytopenia       Plan:  Neuro:  -Currently AO x 3 at baseline mental status   -Moderate pain- will continue low dose Dilaudid in setting of HALEY and hypotension       CV:  -Septic Shock requiring to pressor support- will continue to monitor hemodynamic stability and taper pressor as indicated    s/p 3L NS in ED, started on low dose phenylephrine, will switch to Levophed when central line is placed- taper as tolerated to maintain MAP of 65   will place central line for pressor support   -HTN- Hold antihypertensives in the setting of shock- resume as clinically indicated   -HLD- c/w statin therapy   -CAD- Hold asa in setting of thrombocytopenia   -Cardio Consulted: Dr. Fonseca     Pulm:  -Chest CT neg for acute pulmonary process  -Mild SOB 2/2 pain - saturating 100% on 2L ncann  -will de-escalate O2 as tolerated        ID:  -Septic Shock 2/2 UTI requiring pressor support   c/w tapering dose of levophed to maintain MAP > 65   s/p 1g Rocephin; will start on IV cefepime empirically   UA positive with purulent Urine   f/u Lactate   f/u Blood and Urine cultures    Nephro:  -HALEY on CKD stage 2; creat 2.6 on admission (baseline 1.5)   likely pre-renal from dehydration in the setting of sepsis   s/p 3L NS in ED   will repeat BMP, and send urine lytes    -Rt hydroureteronephrosis with ureteral stones   s/p 1g Rocephin in ED, will continue IV Cefepime empirically   Pt will likely need stent placement to relieve ureteral obs   Urology consulted: Dr. Meza     GI:  -Abdominal pain- likely 2/2 UTI   CT abdomen negative for acute intra-abdominal process   Pt has mild fecal impaction    -Melanotic Stool- Occult pos, but no active bleeding during admission   drop in Hb to 9.7 on admission (baseline 12)- concern for GI bleed   s/p 2u pRBC's in ED on admission- will follow up post transfusion CBC   Protonix 40mg q12 for now   NPO for now    will monitor CBC q6 for now    GI consulted- Dr. Reyna     Heme:  -Pancytopenia on admission likely 2/2 sepsis UTI   c/w antibiotic regimen and monitor CBC daily     -Anemia   drop in Hb to 9.7 on admission (baseline 12)- concern for GI bleed   s/p 2u pRBC's in ED on admission- will follow up post transfusion CBC, will send anemia studies in AM   Protonix 40mg q12 for now   NPO for now    GI consulted- Dr. Reyna      Endo:  -target CBG < 180    Prophy:  - Hold chemical DVT prophy in setting of anemia and thrombocytopenia     Dispo:  - monitor in the ICU. Prognosis guarded based on hemodynamic status    [facility]   FULL CODE Pt is as 75y/o M, from home, lives w/ wife, with PMHx of CAD s/p 1 stent , pre Diabetes, HLD, HTN, Hypothyroidism, congenital atrophic Rt kidney, Renal Stones, and Obesity who presents to ED with Abdominal pain x 2 days.  Pt states yesterday he developed diffuse, 8/10, constant, sharp, non-radiating pain exacerbated when flat, and improved when sitting forward.  Pt reports assoc SOB 2/2 pain.  Pt states pain started when sitting on couch watching TV.  He took two baby asa with no relief.  Pt denies similar pain in the past, and states he suffers from chronic constipation- last BM yesterday evening appeared dark.  Pt denies diarrhea, but states he had 2 episodes of dark formed stool since yesterday.  Pt last took his metoprolol yesterday. Pt denies fever, chills, nausea, vomiting, HA, dizziness, recent illness, sick contacts, recent antibiotic use, or NSAID exposure.  Pt states he underwent colonoscopy/ endoscopy 2 years prior which was only significant for hemorrhoids.  Pt denies CP,  palpitations, rash, extremity edema, cough, weakness, numbness, or syncope.     In the ED, pt presents in mod painful distress, vitals sig for BP 87/51, HR 96,  remaining vitals WNL, and EKG NSR.  Pt presents pancytopenic with WBC 1.59, PLt 82, and Hb 9.7 (baseline 12).  Chest xray appears clear.  UA positive- Urine appears purulent.  CT chest and abdomen performed concerning for mod to severe rt hydroureteronephrosis with obstructing ureteral calculi, cystitis, and external iliac lymphadenopathy.    Pt will be admitted to ICU with concern for Septic Shock and GI bleed     Assessment:  -Septic Shock due to UTI  -Ureteral obstruction  -Anemia   -HALEY on CKD  -Pancytopenia   -CAD  -HTN  -HLD    Plan:  Neuro:  -Currently AO x 3 at baseline mental status   -Moderate pain- will continue low dose Dilaudid in setting of HALEY and hypotension       CV:  -Septic Shock 2/2 obstructive uropathy requiring to pressor support- will continue to monitor hemodynamic stability and taper pressor as indicated    s/p 3L NS in ED, started on low dose phenylephrine, will switch to Levophed when central line is placed- taper as tolerated to maintain MAP of 65   will place central line for pressor support   -HTN- Hold antihypertensives in the setting of shock- resume as clinically indicated   -HLD- c/w statin therapy   -CAD- Hold asa in setting of thrombocytopenia   -Cardio Consulted: Dr. Fonseca     Pulm:  -Chest CT neg for acute pulmonary process  -Mild SOB 2/2 pain - saturating 100% on 2L ncann  -will de-escalate O2 as tolerated        ID:  -Septic Shock 2/2 UTI/ Obstructive Uropathy requiring pressor support   c/w tapering dose of levophed to maintain MAP > 65   s/p 1g Rocephin; will start on IV cefepime empirically   UA positive with purulent Urine   f/u Lactate   f/u Blood and Urine cultures    Nephro:  -HALEY on CKD stage 2; creat 2.6 on admission (baseline 1.5)   likely pre-renal from dehydration in the setting of sepsis   s/p 3L NS in ED   will repeat BMP, and send urine lytes    -Obstructive Uropathy- Rt hydroureteronephrosis with ureteral stones   s/p 1g Rocephin in ED, will continue IV Cefepime empirically   Pt will likely need stent placement to relieve ureteral obs   Urology consulted: Dr. Meza     GI:  -Abdominal pain- likely 2/2 UTI   CT abdomen negative for acute intra-abdominal process   Pt has mild fecal impaction    -Melanotic Stool- Occult pos, but no active bleeding during admission   drop in Hb to 9.7 on admission (baseline 12)- concern for GI bleed   s/p 2u pRBC's in ED on admission- will follow up post transfusion CBC   Protonix 40mg q12 for now   NPO for now    will monitor CBC q6 for now    GI consulted- Dr. Reyna     Heme:  -Pancytopenia on admission likely 2/2 sepsis UTI   c/w antibiotic regimen and monitor CBC daily     -Anemia   drop in Hb to 9.7 on admission (baseline 12)- concern for GI bleed   s/p 2u pRBC's in ED on admission- will follow up post transfusion CBC, will send anemia studies in AM   Protonix 40mg q12 for now   NPO for now    GI consulted- Dr. Reyna      Endo:  -target CBG < 180    Prophy:  - Hold chemical DVT prophy in setting of anemia and thrombocytopenia     Dispo:  - monitor in the ICU. Prognosis guarded based on hemodynamic status    [facility]   FULL CODE Pt is as 73y/o M, from home, lives w/ wife, with PMHx of CAD s/p 1 stent , pre Diabetes, HLD, HTN, Hypothyroidism, congenital atrophic Rt kidney, Renal Stones, and Obesity who presents to ED with Abdominal pain x 2 days.  Pt states yesterday he developed diffuse, 8/10, constant, sharp, non-radiating pain exacerbated when flat, and improved when sitting forward.  Pt reports assoc SOB 2/2 pain.  Pt states pain started when sitting on couch watching TV.  He took two baby asa with no relief.  Pt denies similar pain in the past, and states he suffers from chronic constipation- last BM yesterday evening appeared dark.  Pt denies diarrhea, but states he had 2 episodes of dark formed stool since yesterday.  Pt last took his metoprolol yesterday. Pt denies fever, chills, nausea, vomiting, HA, dizziness, recent illness, sick contacts, recent antibiotic use, or NSAID exposure.  Pt states he underwent colonoscopy/ endoscopy 2 years prior which was only significant for hemorrhoids.  Pt denies CP,  palpitations, rash, extremity edema, cough, weakness, numbness, or syncope.     In the ED, pt presents in mod painful distress, vitals sig for BP 87/51, HR 96,  remaining vitals WNL, and EKG NSR.  Pt presents pancytopenic with WBC 1.59, PLt 82, and Hb 9.7 (baseline 12).  Chest xray appears clear.  UA positive- Urine appears purulent.  CT chest and abdomen performed concerning for mod to severe rt hydroureteronephrosis with obstructing ureteral calculi, cystitis, and external iliac lymphadenopathy.    Pt will be admitted to ICU with concern for Septic Shock and GI bleed     Assessment:  -Septic Shock due to UTI  -Ureteral obstruction  -Anemia   -HALEY on CKD  -Pancytopenia   -CAD  -HTN  -HLD    Plan:  Neuro:  -Currently AO x 3 at baseline mental status   -Moderate pain- will continue low dose Dilaudid in setting of HALEY and hypotension       CV:  -Septic Shock 2/2 obstructive uropathy requiring to pressor support- will continue to monitor hemodynamic stability and taper pressor as indicated    s/p 3L NS in ED, started on low dose phenylephrine, will switch to Levophed when central line is placed- taper as tolerated to maintain MAP of 65   will place central line for pressor support   -HTN- Hold antihypertensives in the setting of shock- resume as clinically indicated   -HLD- c/w statin therapy   -CAD- Hold asa in setting of thrombocytopenia   -Cardio Consulted: Dr. Fonseca     Pulm:  -Chest CT neg for acute pulmonary process  -Mild SOB 2/2 pain - saturating 100% on 2L ncann  -will de-escalate O2 as tolerated        ID:  -Septic Shock 2/2 UTI/ Obstructive Uropathy requiring pressor support   c/w tapering dose of levophed to maintain MAP > 65   s/p 1g Rocephin; will start on IV cefepime empirically   UA positive with purulent Urine   f/u Lactate   f/u Blood and Urine cultures    Nephro:  -HALEY on CKD stage 2 due to obstructive uropathy; creat 2.6 on admission (baseline 1.5)   likely pre-renal from dehydration in the setting of sepsis   s/p 3L NS in ED   will repeat BMP, and send urine lytes    -Obstructive Uropathy- Rt hydroureteronephrosis with ureteral stones   s/p 1g Rocephin in ED, will continue IV Cefepime empirically   Pt will likely need stent placement to relieve ureteral obs   Urology consulted: Dr. Meza     GI:  -Abdominal pain- likely 2/2 UTI   CT abdomen negative for acute intra-abdominal process   Pt has mild fecal impaction    -Melanotic Stool- Occult pos, but no active bleeding during admission   drop in Hb to 9.7 on admission (baseline 12)- concern for GI bleed   s/p 2u pRBC's in ED on admission- will follow up post transfusion CBC   Protonix 40mg q12 for now   NPO for now    will monitor CBC q6 for now    GI consulted- Dr. Reyna     Heme:  -Pancytopenia on admission likely 2/2 sepsis UTI   c/w antibiotic regimen and monitor CBC daily     -Anemia   drop in Hb to 9.7 on admission (baseline 12)- concern for GI bleed   s/p 2u pRBC's in ED on admission- will follow up post transfusion CBC, will send anemia studies in AM   Protonix 40mg q12 for now   NPO for now    GI consulted- Dr. Reyna      Endo:  -target CBG < 180    Prophy:  - Hold chemical DVT prophy in setting of anemia and thrombocytopenia     Dispo:  - monitor in the ICU. Prognosis guarded based on hemodynamic status    GO   FULL CODE

## 2019-10-31 NOTE — H&P ADULT - NSICDXFAMILYHX_GEN_ALL_CORE_FT
FAMILY HISTORY:  Family history of hypertension, mother    Sibling  Still living? Yes, Estimated age: Age Unknown  Family history of diabetes mellitus, Age at diagnosis: Age Unknown

## 2019-10-31 NOTE — PROCEDURE NOTE - NSICDXPROCEDURE_GEN_ALL_CORE_FT
PROCEDURES:  US Doppler guided insertion of central venous catheter 31-Oct-2019 11:39:54  Anton Boogie

## 2019-10-31 NOTE — CONSULT NOTE ADULT - ASSESSMENT
This is 74 y.o. male with CAD, stent 2013,       These are preliminary recommendations and should be not followed until note is signed by attending cardiologist. This is 74 y.o. male with CAD, stent 2013, HTN, borderline DM, hyperlipidemia presents with dyspnea and abdominal pain in the setting of sepsis awaiting urologic procedure.    Preprocedural evaluation:  Patient's Revised Cardiac Risk Index (RCRI) score is 6 (class II risk)and Cole score is 0.9 %, low risk. Patient is at intermediate risk of adverse perioperative cardiac events undergoing intermediate risk surgery. Patient is hypoxic on exam. Previous ECHO and nuclear stress test reviewed. EF preserved, grade I diastolic dysfunction. He does not have an unstable cardiac condition such as decompensated HF or arrhythmia or ischemia that would preclude surgery. No further cardiac testing is needed prior to patient's surgery.     Pt is hypotensive, BP fluctuating 65/39-93/62      These are preliminary recommendations and should be not followed until note is signed by attending cardiologist. This is 74 y.o. male with CAD, stent 2013, HTN, borderline DM, hyperlipidemia presents with dyspnea and abdominal pain in the setting of sepsis awaiting urologic procedure.    Preprocedural evaluation:  Patient's Revised Cardiac Risk Index (RCRI) score is 1 (class II risk)and Cole score is 0.9 %, low risk. Patient is at intermediate risk of adverse perioperative cardiac events undergoing intermediate risk surgery. Patient is hypoxic on exam. Previous ECHO and nuclear stress test reviewed. EF preserved, grade I diastolic dysfunction. He does not have an unstable cardiac condition such as decompensated HF or arrhythmia or ischemia that would preclude surgery. No further cardiac testing is needed prior to patient's surgery.     Pt is hypotensive, BP fluctuating 65/39-93/62,       These are preliminary recommendations and should be not followed until note is signed by attending cardiologist. This is 74 y.o. male with CAD, stent 2013, HTN, borderline DM, hyperlipidemia presents with dyspnea and abdominal pain in the setting of sepsis awaiting urologic procedure.    Preprocedural evaluation:  Patient's Revised Cardiac Risk Index (RCRI) score is 1 (class II risk)and Cole score is 0.9 %, low risk. Patient is at intermediate risk of adverse perioperative cardiac events undergoing intermediate risk surgery. Patient is hypoxic on exam. Previous ECHO and nuclear stress test reviewed. EF preserved, grade I diastolic dysfunction, but would prefer if baseline pro-BNP is done.  please do A1C to establish if patient has DM  Patient does not have an unstable cardiac condition such as decompensated HF or arrhythmia or ischemia that would preclude surgery. No further cardiac testing is needed prior to patient's surgery.   1. CAD- continue statin  Hold ASA pre-operatively, but would resume when cleared from surgical and GI perspectives  Pt is hypotensive, BP fluctuating 65/39-93/62, would hold off all antihypertensive meds until B/P stabilizes, and then would resume first Metoprolol to avoid rebound hypertension.    These are preliminary recommendations and should be not followed until note is signed by attending cardiologist. This is 74 y.o. male with CAD, stent 2013, HTN, borderline DM, hyperlipidemia presents with dyspnea and abdominal pain in the setting of sepsis awaiting urologic procedure.    Preprocedural evaluation:  Patient's Revised Cardiac Risk Index (RCRI) score is 1 (class II risk)and Cole score is 0.9 %. Patient is at intermediate risk of adverse perioperative cardiac events undergoing intermediate risk surgery.  Recent ECHO and nuclear stress test reviewed. EF preserved, grade I diastolic dysfunction, no evidence  of ischemia.   Patient does not have an unstable cardiac condition such as decompensated HF or arrhythmia or ischemia that would preclude surgery. No further cardiac testing is needed prior to patient's surgery.  He is taking shallow inspirations due to pain (splinting).   1. CAD- continue statin  Hold ASA pre-operatively, but would resume when cleared from surgical and GI perspectives after procedures/work-up.  Pt is hypotensive, BP fluctuating 65/39-93/62, would hold off all antihypertensive meds until B/P stabilizes and patient off pressors, and then would resume first Metoprolol to avoid rebound hypertension.

## 2019-10-31 NOTE — CONSULT NOTE ADULT - SUBJECTIVE AND OBJECTIVE BOX
HPI:  Pt is as 73y/o M, from home, lives w/ wife, with PMHx of CAD s/p 1 stent, pre Diabetes, HLD, HTN, Hypothyroidism, congenital atrophic Rt kidney, Renal Stones, and Obesity who presents to ED with Abdominal pain x 2 days.  Pt states yesterday he developed diffuse, 8/10, constant, sharp, non-radiating pain exacerbated when flat, and improved when sitting forward.  Pt reports assoc SOB 2/2 pain.  Pt states pain started when sitting on couch watching TV.  He took two baby asa with no relief.  Pt denies similar pain in the past, and states he suffers from chronic constipation- last BM yesterday evening appeared dark.  Pt denies diarrhea, but states he had 2 episodes of dark formed stool since yesterday.  Pt last took his metoprolol yesterday. Pt denies fever, chills, nausea, vomiting, HA, dizziness, recent illness, sick contacts, recent antibiotic use, or NSAID exposure.  Pt states he underwent colonoscopy/ endoscopy 2 years prior which was only significant for hemorrhoids.  Pt denies CP,  palpitations, rash, extremity edema, cough, weakness, numbness, or syncope.     In the ED, pt presents in mod painful distress, vitals sig for BP 87/51, HR 96,  remaining vitals WNL, and EKG NSR (31 Oct 2019 09:05)      PAST MEDICAL & SURGICAL HISTORY:  Borderline diabetes  Hyperlipidemia  Obesity (BMI 30-39.9)  CAD (coronary artery disease)  Hypothyroid  Hypertension  After cataract, bilateral  S/P hernia repair: left inguinal  History of kidney stones  H/O total knee replacement, left  H/O heart artery stent:       No Known Allergies      Meds:  cefepime   IVPB      cefepime   IVPB 2000 milliGRAM(s) IV Intermittent every 12 hours  HYDROmorphone  Injectable 0.5 milliGRAM(s) IV Push every 4 hours PRN  influenza   Vaccine 0.5 milliLiter(s) IntraMuscular once  lactated ringers. 1000 milliLiter(s) IV Continuous <Continuous>  norepinephrine Infusion 0.05 MICROgram(s)/kG/Min IV Continuous <Continuous>  pantoprazole  Injectable 40 milliGRAM(s) IV Push every 12 hours      SOCIAL HISTORY:  Smoker:  YES / NO        PACK YEARS:                         WHEN QUIT?  ETOH use:  YES / NO               FREQUENCY / QUANTITY:  Ilicit Drug use:  YES / NO  Occupation:  Assisted device use (Cane / Walker):  Live with:    FAMILY HISTORY:  Family history of hypertension: mother  Family history of diabetes mellitus (Sibling): sister and bothers      VITALS:  Vital Signs Last 24 Hrs  T(C): 36.7 (31 Oct 2019 16:11), Max: 37.4 (31 Oct 2019 09:27)  T(F): 98.1 (31 Oct 2019 16:11), Max: 99.4 (31 Oct 2019 09:27)  HR: 81 (31 Oct 2019 18:00) (76 - 96)  BP: 105/55 (31 Oct 2019 18:00) (65/39 - 121/71)  BP(mean): 68 (31 Oct 2019 18:00) (58 - 81)  RR: 24 (31 Oct 2019 18:00) (17 - 31)  SpO2: 96% (31 Oct 2019 18:00) (94% - 100%)    LABS/DIAGNOSTIC TESTS:                          11.4   11.11 )-----------( 68       ( 31 Oct 2019 15:05 )             37.0     WBC Count: 11.11 K/uL (10-31 @ 15:05)  WBC Count: 7.79 K/uL (10-31 @ 11:59)  WBC Count: 1.59 K/uL (10-31 @ 07:18)      10-31    140  |  113<H>  |  31<H>  ----------------------------<  123<H>  3.6   |  17<L>  |  2.87<H>    Ca    7.9<L>      31 Oct 2019 15:05    TPro  5.0<L>  /  Alb  2.0<L>  /  TBili  1.2  /  DBili  x   /  AST  18  /  ALT  17  /  AlkPhos  258<H>  10-31      Urinalysis Basic - ( 31 Oct 2019 08:51 )    Color: Yellow / Appearance: very cloudy / S.015 / pH: x  Gluc: x / Ketone: Trace  / Bili: Negative / Urobili: 1   Blood: x / Protein: 500 mg/dL / Nitrite: Positive   Leuk Esterase: Moderate / RBC: 10-25 /HPF / WBC >50 /HPF   Sq Epi: x / Non Sq Epi: Negative /HPF / Bacteria: Many /HPF        LIVER FUNCTIONS - ( 31 Oct 2019 07:18 )  Alb: 2.0 g/dL / Pro: 5.0 g/dL / ALK PHOS: 258 U/L / ALT: 17 U/L DA / AST: 18 U/L / GGT: x             PT/INR - ( 31 Oct 2019 07:18 )   PT: 14.1 sec;   INR: 1.26 ratio         PTT - ( 31 Oct 2019 07:18 )  PTT:24.7 sec    LACTATE:Lactate, Blood: 4.3 mmol/L (10-31 @ 15:05)  Lactate, Blood: 3.8 mmol/L (10-31 @ 11:57)      ABG -     CULTURES:       RADIOLOGY:< from: CT Abdomen and Pelvis No Cont (10.31.19 @ 08:37) >  EXAM:  CT ABDOMEN AND PELVIS                          EXAM:  CT CHEST                            PROCEDURE DATE:  10/31/2019          INTERPRETATION:  CT of the chest, abdomen, and pelvis without IV contrast    Indication: GI bleed.    Technique: Axial multidetector CT images of the chest, abdomen, and   pelvis are acquired without IV contrast.    Comparison: None.    Findings:    Chest: No pleural or pericardial effusion. The heart is not enlarged.   Aortic and coronary artery calcifications are present. Mild ectasia of   the ascending aorta at 3.7 cm in diameter. 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 Allowing for the noncontrast technique, there is no   grossly enlarged mediastinal, hilar, or axillary lymph node.    The trachea and central bronchi are patent.    No evidence for pneumothorax or pulmonary consolidation. Small bilateral   atelectasis. Nonspecific 4 mm subpleural right middle lobe lung nodule   (image 77 series 2). Nonspecific 4 mm subpleural left lower lobe lung   nodule (image 25 series 2). If the patient is in the high risk category   (i.e. smoker), follow-up chest CT may be pursued in 12 months to ensure   stability.    Abdomen/pelvis: Evaluation of the abdomen and pelvis is limited by lack   of IV and oral contrast. Allowing for the noncontrast technique, the   liver, common bile duct, pancreas, and spleen appear grossly   unremarkable. Nonspecific mild adrenal thickening bilaterally.   Cholecystectomy clips are present.    There are 2 right distal ureteral calculi measuring up to 1.2 cm with   associated moderate to severe right hydroureteronephrosis. The right   kidney is atrophic. Small hypodense lesion in the right kidney,   representing a probable cyst. There is a small nonobstructive calculus in   the left kidney. No evidence for a calculus in the left ureter. There is   compensatory hypertrophy of the left kidney. Small hypodense lesion in   the left kidney, representing a probable cyst. No left hydronephrosis.    The appendix appears normal. Colonic diverticulosis without evidence for   diverticulitis. Nonspecific submucosal fat deposition of the ascending   colon. No bowel obstruction or grossly thickened bowel wall.    Small fat-containing periumbilical hernia.     No evidence for free air, or ascites. Mildly enlarged external iliac   chain lymph nodes bilaterally measuring up to 1.3 cm on the right and 1.1   cm on the left.     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    Geronimo catheter in the urinary bladder. The urinary bladder is collapsed,   rendering evaluation of the urinary bladder wall difficult.  Mild   stranding adjacent to the urinary bladder, suggestive of cystitis. The   prostate and seminal vesicles appear grossly unremarkable.    Degenerative spondylosis. 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    Impression:    No evidence for pneumothorax or pulmonary consolidation.    Small bilateral lung nodules; if the patient is in the high risk category   (i.e. smoker), follow-up chest CT may be pursued in 12 months to ensure   stability.    The appendix appears normal. Colonic diverticulosis without evidence for   diverticulitis. No bowelobstruction or grossly thickened bowel wall.    Mildly enlarged external iliac chain lymph nodes bilaterally measuring up   to 1.3 cm on the right and 1.1 cm on the left.    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    Mild stranding adjacent to the urinary bladder, suggestive of cystitis.    Other findings as above.                SUZETTE GOMEZ M.D., ATTENDING RADIOLOGIST  This document has been electronicallysigned. Oct 31 2019  9:27AM          < end of copied text >              ROS  [  ] UNABLE TO ELICIT HPI:  Pt is as 75y/o M, from home, lives w/ wife, with PMHx of CAD s/p 1 stent, pre Diabetes, HLD, HTN, Hypothyroidism, congenital atrophic Rt kidney, Renal Stones, and Obesity who presents to ED with Abdominal pain x 2 days.  Pt states yesterday he developed diffuse, 8/10, constant, sharp, non-radiating pain exacerbated when flat, and improved when sitting forward.  Pt reports assoc SOB 2/2 pain.  Pt states pain started when sitting on couch watching TV.  He took two baby asa with no relief.  Pt denies similar pain in the past, and states he suffers from chronic constipation- last BM yesterday evening appeared dark.  Pt denies diarrhea, but states he had 2 episodes of dark formed stool since yesterday.  Pt last took his metoprolol yesterday. Pt denies fever, chills, nausea, vomiting, HA, dizziness, recent illness, sick contacts, recent antibiotic use, or NSAID exposure.  Pt states he underwent colonoscopy/ endoscopy 2 years prior which was only significant for hemorrhoids.  Pt denies CP,  palpitations, rash, extremity edema, cough, weakness, numbness, or syncope.   In the ED, pt presents in mod painful distress, vitals sig for BP 87/51, HR 96,  remaining vitals WNL, and EKG NSR (31 Oct 2019 09:05)    History as above , Pt with above history was admitted with abdominal pain x 2 days and found to have right sided Pyelonephritis , he had no fevers at home, he was found to be hypotensive and is currently in the ICU on 1 pressor only. He needs to get a right nephrostomy tube placed but IR here is unable to do it and so he is going to be transferred to [facility] for the procedure . He c/o SOB , along with abdominal distention and abdominal pain. He has no back pain or other complaints just now.       PAST MEDICAL & SURGICAL HISTORY:  Borderline diabetes  Hyperlipidemia  Obesity (BMI 30-39.9)  CAD (coronary artery disease)  Hypothyroid  Hypertension  After cataract, bilateral  S/P hernia repair: left inguinal  History of kidney stones  H/O total knee replacement, left  H/O heart artery stent:       No Known Allergies      Meds:  cefepime   IVPB      cefepime   IVPB 2000 milliGRAM(s) IV Intermittent every 12 hours  HYDROmorphone  Injectable 0.5 milliGRAM(s) IV Push every 4 hours PRN  influenza   Vaccine 0.5 milliLiter(s) IntraMuscular once  lactated ringers. 1000 milliLiter(s) IV Continuous <Continuous>  norepinephrine Infusion 0.05 MICROgram(s)/kG/Min IV Continuous <Continuous>  pantoprazole  Injectable 40 milliGRAM(s) IV Push every 12 hours      SOCIAL HISTORY:  Smoker:  ex smoker  ETOH use:  he has drinks a day      FAMILY HISTORY:  Family history of hypertension: mother  Family history of diabetes mellitus (Sibling): sister and bothers      VITALS:  Vital Signs Last 24 Hrs  T(C): 36.7 (31 Oct 2019 16:11), Max: 37.4 (31 Oct 2019 09:27)  T(F): 98.1 (31 Oct 2019 16:11), Max: 99.4 (31 Oct 2019 09:27)  HR: 81 (31 Oct 2019 18:00) (76 - 96)  BP: 105/55 (31 Oct 2019 18:00) (65/39 - 121/71)  BP(mean): 68 (31 Oct 2019 18:00) (58 - 81)  RR: 24 (31 Oct 2019 18:00) (17 - 31)  SpO2: 96% (31 Oct 2019 18:00) (94% - 100%)    LABS/DIAGNOSTIC TESTS:                          11.4   11.11 )-----------( 68       ( 31 Oct 2019 15:05 )             37.0     WBC Count: 11.11 K/uL (10-31 @ 15:05)  WBC Count: 7.79 K/uL (10-31 @ 11:59)  WBC Count: 1.59 K/uL (10-31 @ 07:18)      10-31    140  |  113<H>  |  31<H>  ----------------------------<  123<H>  3.6   |  17<L>  |  2.87<H>    Ca    7.9<L>      31 Oct 2019 15:05    TPro  5.0<L>  /  Alb  2.0<L>  /  TBili  1.2  /  DBili  x   /  AST  18  /  ALT  17  /  AlkPhos  258<H>  10-31      Urinalysis Basic - ( 31 Oct 2019 08:51 )    Color: Yellow / Appearance: very cloudy / S.015 / pH: x  Gluc: x / Ketone: Trace  / Bili: Negative / Urobili: 1   Blood: x / Protein: 500 mg/dL / Nitrite: Positive   Leuk Esterase: Moderate / RBC: 10-25 /HPF / WBC >50 /HPF   Sq Epi: x / Non Sq Epi: Negative /HPF / Bacteria: Many /HPF        LIVER FUNCTIONS - ( 31 Oct 2019 07:18 )  Alb: 2.0 g/dL / Pro: 5.0 g/dL / ALK PHOS: 258 U/L / ALT: 17 U/L DA / AST: 18 U/L / GGT: x             PT/INR - ( 31 Oct 2019 07:18 )   PT: 14.1 sec;   INR: 1.26 ratio         PTT - ( 31 Oct 2019 07:18 )  PTT:24.7 sec    LACTATE:Lactate, Blood: 4.3 mmol/L (10-31 @ 15:05)  Lactate, Blood: 3.8 mmol/L (10-31 @ 11:57)      ABG -     CULTURES:       RADIOLOGY:< from: CT Abdomen and Pelvis No Cont (10.31.19 @ 08:37) >  EXAM:  CT ABDOMEN AND PELVIS                          EXAM:  CT CHEST                            PROCEDURE DATE:  10/31/2019          INTERPRETATION:  CT of the chest, abdomen, and pelvis without IV contrast    Indication: GI bleed.    Technique: Axial multidetector CT images of the chest, abdomen, and   pelvis are acquired without IV contrast.    Comparison: None.    Findings:    Chest: No pleural or pericardial effusion. The heart is not enlarged.   Aortic and coronary artery calcifications are present. Mild ectasia of   the ascending aorta at 3.7 cm in diameter. 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 Allowing for the noncontrast technique, there is no   grossly enlarged mediastinal, hilar, or axillary lymph node.    The trachea and central bronchi are patent.    No evidence for pneumothorax or pulmonary consolidation. Small bilateral   atelectasis. Nonspecific 4 mm subpleural right middle lobe lung nodule   (image 77 series 2). Nonspecific 4 mm subpleural left lower lobe lung   nodule (image 25 series 2). If the patient is in the high risk category   (i.e. smoker), follow-up chest CT may be pursued in 12 months to ensure   stability.    Abdomen/pelvis: Evaluation of the abdomen and pelvis is limited by lack   of IV and oral contrast. Allowing for the noncontrast technique, the   liver, common bile duct, pancreas, and spleen appear grossly   unremarkable. Nonspecific mild adrenal thickening bilaterally.   Cholecystectomy clips are present.    There are 2 right distal ureteral calculi measuring up to 1.2 cm with   associated moderate to severe right hydroureteronephrosis. The right   kidney is atrophic. Small hypodense lesion in the right kidney,   representing a probable cyst. There is a small nonobstructive calculus in   the left kidney. No evidence for a calculus in the left ureter. There is   compensatory hypertrophy of the left kidney. Small hypodense lesion in   the left kidney, representing a probable cyst. No left hydronephrosis.    The appendix appears normal. Colonic diverticulosis without evidence for   diverticulitis. Nonspecific submucosal fat deposition of the ascending   colon. No bowel obstruction or grossly thickened bowel wall.    Small fat-containing periumbilical hernia.     No evidence for free air, or ascites. Mildly enlarged external iliac   chain lymph nodes bilaterally measuring up to 1.3 cm on the right and 1.1   cm on the left.     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    Geronimo catheter in the urinary bladder. The urinary bladder is collapsed,   rendering evaluation of the urinary bladder wall difficult.  Mild   stranding adjacent to the urinary bladder, suggestive of cystitis. The   prostate and seminal vesicles appear grossly unremarkable.    Degenerative spondylosis. 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    Impression:    No evidence for pneumothorax or pulmonary consolidation.    Small bilateral lung nodules; if the patient is in the high risk category   (i.e. smoker), follow-up chest CT may be pursued in 12 months to ensure   stability.    The appendix appears normal. Colonic diverticulosis without evidence for   diverticulitis. No bowelobstruction or grossly thickened bowel wall.    Mildly enlarged external iliac chain lymph nodes bilaterally measuring up   to 1.3 cm on the right and 1.1 cm on the left.    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    Mild stranding adjacent to the urinary bladder, suggestive of cystitis.    Other findings as above.                SUZETTE GOMEZ M.D., ATTENDING RADIOLOGIST  This document has been electronicallysigned. Oct 31 2019  9:27AM          < end of copied text >              ROS  [  ] UNABLE TO ELICIT

## 2019-10-31 NOTE — ACUTE INTERFACILITY TRANSFER NOTE - PLAN OF CARE
Stent/ Nephrostomy to relieve obstruction You were found to be in septic shock due to obstructive ureteral stone with hydronephrosis. You were started on antibiotics, urology was consulted who recommended percutaneous tube to relieve obstruction. You are being transferred to [facility] to receive medical care not available in our hospital. Meanwhile will continue the pressor medication, antibiotics and supportive care. You blood cultures and urine cultures are sent Your imaging studies show obstructive stone with hydronephrosis that needs to be drained. You are being transferred for nephrostomy tube. You were found to have positive UA, cultures were sent and you are started on antibiotics. Your stool occult was positive. You received 2 prbc. No active bleeding was noted and you are started on pantoprazole. Dr Reyna was consulted and he will follow you at [facility]

## 2019-10-31 NOTE — ED PROVIDER NOTE - PROGRESS NOTE DETAILS
ADDENDUM by Pelon Loredo M.D.: I received sign-off from Dr. VIANNEY Melendez. 74yoM with h/o HTN, HLD, CVA, CAD s/p stent on ASA alone, hypothyroidism, congenital one kidney, presents with hypoTN and generalized abdominal pain L>R, black stool x 2 days, associated SOB but only b/c of pain, brown stool, received 3L fluids and 2u PRBC already completed with significant improvement in BP, ordered protonix, ceftriaxone, another 1u PRBC and 1u platelets, CT ordered, ICU called and awaiting consult. On my assessment, pt confirms hx, denies CP, denies SOB other than due to abdominal pain. Lungs CTAB, abd diffusely TTP, 2+ radial pulse, no LE edema. Bedside FAST U/S with no free fluid, grossly poor EF. Awaiting ICU consult, CT non-con in light of HALEY and congenital agenesis. ICU accepted, assuming care at this time.

## 2019-10-31 NOTE — PROCEDURE NOTE - NSPROCDETAILS_GEN_ALL_CORE
guidewire recovered/lumen(s) aspirated and flushed/ultrasound guidance/sterile dressing applied/sterile technique, catheter placed

## 2019-10-31 NOTE — ED ADULT TRIAGE NOTE - CHIEF COMPLAINT QUOTE
notification/hypotensive /black stools/abdominal distention notification/hypotensive 67/43/black stools/abdominal distention/sob

## 2019-10-31 NOTE — CONSULT NOTE ADULT - ATTENDING COMMENTS
septic patient on pressors with pos ua grossly infected urine ureteral stones and hydro  dw with fam/pt pcn vs stent  in this setting pcn is safer option   advise transfer to [facility] or [facility] for placement
I was physically present for the key portions of the evaluation and management (E/M) service provided.  The patient was personally seen and examined at bedside.    Thank you for your consultation and allowing  me to participate in the care of your patients. If you have further questions please contact me at [phone].     Lino Cummings M.D.       _________________________________________________________________________________________________  [facility]  [address] [place] [address]  Office: [phone]    Gregory Juarez PA-C  ___________________________________________________________________________________________________
Thank you for the courtesy of a consultation, please contact me for any additional questions

## 2019-10-31 NOTE — ACUTE INTERFACILITY TRANSFER NOTE - HOSPITAL COURSE
Pt is a 73y/o M, from home, lives w/ wife, with PMHx of CAD s/p 1 stent, pre Diabetes, HLD, HTN, Hypothyroidism, congenital atrophic Rt kidney, Renal Stones, and Obesity who presents to ED with Abdominal pain x 2 days and 2 episodes of dark formed stool since yesterday. In the ED, pt presents in mod painful distress, vitals sig for BP 87/51, HR 96,  remaining vitals WNL, and EKG NSR. Pt occult blood was positive. Pt underwent CT abdomen that showed right ureteral stent with moderate hydronephrosis. Pt was started on pressors, IV antibiotics. Urology, IR, gastroenterology was consulted who recommended nephrostomy tube placement to relieve obstruction. Pt is being transferred to [facility] for intervention. Pt received 2 prbc & 3L of IVF, GI was consulted with no urgent intervention at present.

## 2019-10-31 NOTE — CONSULT NOTE ADULT - PROBLEM SELECTOR RECOMMENDATION 9
-Npo -  IVF   -IV PPI pushes   -Avoid NSAIDs   -Likey stress induced gastropathy with use of ASA   -No active bleeding at this time   Will monitor CBC   No endoscopic intervention at this time

## 2019-10-31 NOTE — CONSULT NOTE ADULT - ASSESSMENT
74 year old male with obstructing right ureteral calculi with possible hydronephrosis in septic shock. Also with two episodes of dark stools.

## 2019-10-31 NOTE — CHART NOTE - NSCHARTNOTEFT_GEN_A_CORE
Patient with obstructing right ureteral calculi with possible hydronephrosis in septic shock. Discussed case with Urology attending, patient needs percutaneous nephrostomy for drainage at this time. Referred to interventional radiology attending who recommends transfer to tertiary centre as patient with congenital right atrophic kidney and high risk of complications, should have intervention performed by someone with higher expertise. As such patient referred to [facility]. Case discussed with MICU fellow Dr. Daugherty, who accepted the patient for transfer under Dr. Castro's service.

## 2019-10-31 NOTE — ED PROVIDER NOTE - CLINICAL SUMMARY MEDICAL DECISION MAKING FREE TEXT BOX
74 year old male with abd pain and melanotic stools. hypotensive. PE as above.  on arrival pt hypotensive likely 2/2 GI bleed as complained of black stools. given 2L NS bolus and 2 units uncrossed prbc. BP improved from 70s-90s.  labs significant for pancytopenia, hgb dropped from 11 to 9.  given protonix. platelets low and on ASA- ordered unit of platelets and additional 1 unit prbc  ecg NSR, RRR, no st elevations.  ICU consulted.

## 2019-11-01 ENCOUNTER — INPATIENT (INPATIENT)
Facility: HOSPITAL | Age: 74
LOS: 5 days | Discharge: HOME | End: 2019-11-07
Attending: HOSPITALIST | Admitting: HOSPITALIST
Payer: MEDICARE

## 2019-11-01 ENCOUNTER — TRANSCRIPTION ENCOUNTER (OUTPATIENT)
Age: 74
End: 2019-11-01

## 2019-11-01 VITALS
RESPIRATION RATE: 17 BRPM | TEMPERATURE: 99 F | HEIGHT: 64 IN | SYSTOLIC BLOOD PRESSURE: 116 MMHG | WEIGHT: 211.64 LBS | HEART RATE: 84 BPM | DIASTOLIC BLOOD PRESSURE: 71 MMHG | OXYGEN SATURATION: 99 %

## 2019-11-01 VITALS
SYSTOLIC BLOOD PRESSURE: 112 MMHG | DIASTOLIC BLOOD PRESSURE: 59 MMHG | OXYGEN SATURATION: 98 % | RESPIRATION RATE: 20 BRPM | HEART RATE: 82 BPM

## 2019-11-01 DIAGNOSIS — Z96.652 PRESENCE OF LEFT ARTIFICIAL KNEE JOINT: Chronic | ICD-10-CM

## 2019-11-01 DIAGNOSIS — Z87.442 PERSONAL HISTORY OF URINARY CALCULI: Chronic | ICD-10-CM

## 2019-11-01 DIAGNOSIS — Z95.5 PRESENCE OF CORONARY ANGIOPLASTY IMPLANT AND GRAFT: Chronic | ICD-10-CM

## 2019-11-01 DIAGNOSIS — N13.6 PYONEPHROSIS: ICD-10-CM

## 2019-11-01 DIAGNOSIS — R57.9 SHOCK, UNSPECIFIED: ICD-10-CM

## 2019-11-01 DIAGNOSIS — Z98.89 OTHER SPECIFIED POSTPROCEDURAL STATES: Chronic | ICD-10-CM

## 2019-11-01 DIAGNOSIS — H26.40 UNSPECIFIED SECONDARY CATARACT: Chronic | ICD-10-CM

## 2019-11-01 DIAGNOSIS — Z71.89 OTHER SPECIFIED COUNSELING: ICD-10-CM

## 2019-11-01 LAB
ALBUMIN SERPL ELPH-MCNC: 2 G/DL — LOW (ref 3.5–5)
ALBUMIN SERPL ELPH-MCNC: 2.6 G/DL — LOW (ref 3.3–5)
ALP SERPL-CCNC: 133 U/L — HIGH (ref 40–120)
ALP SERPL-CCNC: 134 U/L — HIGH (ref 40–120)
ALT FLD-CCNC: 19 U/L — SIGNIFICANT CHANGE UP (ref 4–41)
ALT FLD-CCNC: 26 U/L DA — SIGNIFICANT CHANGE UP (ref 10–60)
ANION GAP SERPL CALC-SCNC: 10 MMOL/L — SIGNIFICANT CHANGE UP (ref 5–17)
ANION GAP SERPL CALC-SCNC: 14 MMO/L — SIGNIFICANT CHANGE UP (ref 7–14)
ANISOCYTOSIS BLD QL: SLIGHT — SIGNIFICANT CHANGE UP
APTT BLD: 23.9 SEC — LOW (ref 27.5–36.3)
APTT BLD: 30.1 SEC — SIGNIFICANT CHANGE UP (ref 27.5–36.3)
AST SERPL-CCNC: 26 U/L — SIGNIFICANT CHANGE UP (ref 4–40)
AST SERPL-CCNC: 30 U/L — SIGNIFICANT CHANGE UP (ref 10–40)
BASOPHILS # BLD AUTO: 0.05 K/UL — SIGNIFICANT CHANGE UP (ref 0–0.2)
BASOPHILS # BLD AUTO: SIGNIFICANT CHANGE UP K/UL (ref 0–0.2)
BASOPHILS NFR BLD AUTO: 0.4 % — SIGNIFICANT CHANGE UP (ref 0–2)
BASOPHILS NFR BLD AUTO: SIGNIFICANT CHANGE UP % (ref 0–2)
BASOPHILS NFR SPEC: 0 % — SIGNIFICANT CHANGE UP (ref 0–2)
BILIRUB SERPL-MCNC: 1.4 MG/DL — HIGH (ref 0.2–1.2)
BILIRUB SERPL-MCNC: 1.8 MG/DL — HIGH (ref 0.2–1.2)
BLD GP AB SCN SERPL QL: NEGATIVE — SIGNIFICANT CHANGE UP
BUN SERPL-MCNC: 39 MG/DL — HIGH (ref 7–18)
BUN SERPL-MCNC: 41 MG/DL — HIGH (ref 7–23)
CALCIUM SERPL-MCNC: 8.1 MG/DL — LOW (ref 8.4–10.5)
CALCIUM SERPL-MCNC: 8.5 MG/DL — SIGNIFICANT CHANGE UP (ref 8.4–10.5)
CHLORIDE SERPL-SCNC: 108 MMOL/L — HIGH (ref 98–107)
CHLORIDE SERPL-SCNC: 111 MMOL/L — HIGH (ref 96–108)
CO2 SERPL-SCNC: 17 MMOL/L — LOW (ref 22–31)
CO2 SERPL-SCNC: 17 MMOL/L — LOW (ref 22–31)
CREAT SERPL-MCNC: 3.19 MG/DL — HIGH (ref 0.5–1.3)
CREAT SERPL-MCNC: 3.27 MG/DL — HIGH (ref 0.5–1.3)
E COLI DNA BLD POS QL NAA+NON-PROBE: SIGNIFICANT CHANGE UP
EOSINOPHIL # BLD AUTO: 0.02 K/UL — SIGNIFICANT CHANGE UP (ref 0–0.5)
EOSINOPHIL # BLD AUTO: SIGNIFICANT CHANGE UP K/UL (ref 0–0.5)
EOSINOPHIL NFR BLD AUTO: 0.2 % — SIGNIFICANT CHANGE UP (ref 0–6)
EOSINOPHIL NFR BLD AUTO: SIGNIFICANT CHANGE UP % (ref 0–6)
EOSINOPHIL NFR FLD: 0 % — SIGNIFICANT CHANGE UP (ref 0–6)
GLUCOSE SERPL-MCNC: 143 MG/DL — HIGH (ref 70–99)
GLUCOSE SERPL-MCNC: 97 MG/DL — SIGNIFICANT CHANGE UP (ref 70–99)
GRAM STN FLD: SIGNIFICANT CHANGE UP
HCT VFR BLD CALC: 36.5 % — LOW (ref 39–50)
HCT VFR BLD CALC: 37 % — LOW (ref 39–50)
HCT VFR BLD CALC: 39 % — SIGNIFICANT CHANGE UP (ref 39–50)
HGB BLD-MCNC: 11.2 G/DL — LOW (ref 13–17)
HGB BLD-MCNC: 11.4 G/DL — LOW (ref 13–17)
HGB BLD-MCNC: 11.7 G/DL — LOW (ref 13–17)
IMM GRANULOCYTES NFR BLD AUTO: 10.8 % — HIGH (ref 0–1.5)
IMM GRANULOCYTES NFR BLD AUTO: SIGNIFICANT CHANGE UP % (ref 0–1.5)
INR BLD: 1.31 — HIGH (ref 0.88–1.17)
INR BLD: 1.32 RATIO — HIGH (ref 0.88–1.16)
LACTATE SERPL-SCNC: 1.3 MMOL/L — SIGNIFICANT CHANGE UP (ref 0.5–2)
LACTATE SERPL-SCNC: 1.8 MMOL/L — SIGNIFICANT CHANGE UP (ref 0.7–2)
LYMPHOCYTES # BLD AUTO: 0.38 K/UL — LOW (ref 1–3.3)
LYMPHOCYTES # BLD AUTO: 3.2 % — LOW (ref 13–44)
LYMPHOCYTES # BLD AUTO: SIGNIFICANT CHANGE UP % (ref 13–44)
LYMPHOCYTES # BLD AUTO: SIGNIFICANT CHANGE UP K/UL (ref 1–3.3)
LYMPHOCYTES NFR SPEC AUTO: 4 % — LOW (ref 13–44)
MACROCYTES BLD QL: SLIGHT — SIGNIFICANT CHANGE UP
MAGNESIUM SERPL-MCNC: 1.5 MG/DL — LOW (ref 1.6–2.6)
MAGNESIUM SERPL-MCNC: 2 MG/DL — SIGNIFICANT CHANGE UP (ref 1.6–2.6)
MANUAL SMEAR VERIFICATION: SIGNIFICANT CHANGE UP
MCHC RBC-ENTMCNC: 25.3 PG — LOW (ref 27–34)
MCHC RBC-ENTMCNC: 26.1 PG — LOW (ref 27–34)
MCHC RBC-ENTMCNC: 26.3 PG — LOW (ref 27–34)
MCHC RBC-ENTMCNC: 30 % — LOW (ref 32–36)
MCHC RBC-ENTMCNC: 30.7 GM/DL — LOW (ref 32–36)
MCHC RBC-ENTMCNC: 30.8 GM/DL — LOW (ref 32–36)
MCV RBC AUTO: 84.2 FL — SIGNIFICANT CHANGE UP (ref 80–100)
MCV RBC AUTO: 84.9 FL — SIGNIFICANT CHANGE UP (ref 80–100)
MCV RBC AUTO: 85.7 FL — SIGNIFICANT CHANGE UP (ref 80–100)
METHOD TYPE: SIGNIFICANT CHANGE UP
MONOCYTES # BLD AUTO: 1.17 K/UL — HIGH (ref 0–0.9)
MONOCYTES # BLD AUTO: SIGNIFICANT CHANGE UP K/UL (ref 0–0.9)
MONOCYTES NFR BLD AUTO: 10 % — SIGNIFICANT CHANGE UP (ref 2–14)
MONOCYTES NFR BLD AUTO: SIGNIFICANT CHANGE UP % (ref 2–14)
MONOCYTES NFR BLD: 14 % — HIGH (ref 2–9)
MYELOCYTES NFR BLD: 1 % — HIGH (ref 0–0)
NEUTROPHIL AB SER-ACNC: 67 % — SIGNIFICANT CHANGE UP (ref 43–77)
NEUTROPHILS # BLD AUTO: 8.84 K/UL — HIGH (ref 1.8–7.4)
NEUTROPHILS # BLD AUTO: SIGNIFICANT CHANGE UP K/UL (ref 1.8–7.4)
NEUTROPHILS NFR BLD AUTO: 75.4 % — SIGNIFICANT CHANGE UP (ref 43–77)
NEUTROPHILS NFR BLD AUTO: SIGNIFICANT CHANGE UP % (ref 43–77)
NEUTS BAND # BLD: 14 % — HIGH (ref 0–6)
NRBC # BLD: 0 /100 WBCS — SIGNIFICANT CHANGE UP (ref 0–0)
NRBC # BLD: 0 /100 WBCS — SIGNIFICANT CHANGE UP (ref 0–0)
NRBC # BLD: 0 /100WBC — SIGNIFICANT CHANGE UP
NRBC # FLD: 0.02 K/UL — SIGNIFICANT CHANGE UP (ref 0–0)
PHOSPHATE SERPL-MCNC: 2.8 MG/DL — SIGNIFICANT CHANGE UP (ref 2.5–4.5)
PHOSPHATE SERPL-MCNC: 2.8 MG/DL — SIGNIFICANT CHANGE UP (ref 2.5–4.5)
PLATELET # BLD AUTO: 55 K/UL — LOW (ref 150–400)
PLATELET # BLD AUTO: 60 K/UL — LOW (ref 150–400)
PLATELET # BLD AUTO: 61 K/UL — LOW (ref 150–400)
PLATELET COUNT - ESTIMATE: SIGNIFICANT CHANGE UP
PMV BLD: 12.1 FL — SIGNIFICANT CHANGE UP (ref 7–13)
POLYCHROMASIA BLD QL SMEAR: SLIGHT — SIGNIFICANT CHANGE UP
POTASSIUM SERPL-MCNC: 3.7 MMOL/L — SIGNIFICANT CHANGE UP (ref 3.5–5.3)
POTASSIUM SERPL-MCNC: 3.8 MMOL/L — SIGNIFICANT CHANGE UP (ref 3.5–5.3)
POTASSIUM SERPL-SCNC: 3.7 MMOL/L — SIGNIFICANT CHANGE UP (ref 3.5–5.3)
POTASSIUM SERPL-SCNC: 3.8 MMOL/L — SIGNIFICANT CHANGE UP (ref 3.5–5.3)
PROT SERPL-MCNC: 5.6 G/DL — LOW (ref 6–8.3)
PROT SERPL-MCNC: 5.8 G/DL — LOW (ref 6–8.3)
PROTHROM AB SERPL-ACNC: 14.8 SEC — HIGH (ref 10–12.9)
PROTHROM AB SERPL-ACNC: 15 SEC — HIGH (ref 9.8–13.1)
RBC # BLD: 4.26 M/UL — SIGNIFICANT CHANGE UP (ref 4.2–5.8)
RBC # BLD: 4.36 M/UL — SIGNIFICANT CHANGE UP (ref 4.2–5.8)
RBC # BLD: 4.63 M/UL — SIGNIFICANT CHANGE UP (ref 4.2–5.8)
RBC # FLD: 16.2 % — HIGH (ref 10.3–14.5)
RBC # FLD: 16.2 % — HIGH (ref 10.3–14.5)
RBC # FLD: 16.3 % — HIGH (ref 10.3–14.5)
RH IG SCN BLD-IMP: POSITIVE — SIGNIFICANT CHANGE UP
RH IG SCN BLD-IMP: POSITIVE — SIGNIFICANT CHANGE UP
SODIUM SERPL-SCNC: 138 MMOL/L — SIGNIFICANT CHANGE UP (ref 135–145)
SODIUM SERPL-SCNC: 139 MMOL/L — SIGNIFICANT CHANGE UP (ref 135–145)
WBC # BLD: 11.72 K/UL — HIGH (ref 3.8–10.5)
WBC # BLD: 12.57 K/UL — HIGH (ref 3.8–10.5)
WBC # BLD: 14.16 K/UL — HIGH (ref 3.8–10.5)
WBC # FLD AUTO: 11.72 K/UL — HIGH (ref 3.8–10.5)
WBC # FLD AUTO: 12.57 K/UL — HIGH (ref 3.8–10.5)
WBC # FLD AUTO: 14.16 K/UL — HIGH (ref 3.8–10.5)

## 2019-11-01 PROCEDURE — 83735 ASSAY OF MAGNESIUM: CPT

## 2019-11-01 PROCEDURE — 83690 ASSAY OF LIPASE: CPT

## 2019-11-01 PROCEDURE — 36430 TRANSFUSION BLD/BLD COMPNT: CPT

## 2019-11-01 PROCEDURE — 87640 STAPH A DNA AMP PROBE: CPT

## 2019-11-01 PROCEDURE — 87086 URINE CULTURE/COLONY COUNT: CPT

## 2019-11-01 PROCEDURE — 71045 X-RAY EXAM CHEST 1 VIEW: CPT | Mod: 26

## 2019-11-01 PROCEDURE — 86850 RBC ANTIBODY SCREEN: CPT

## 2019-11-01 PROCEDURE — 85027 COMPLETE CBC AUTOMATED: CPT

## 2019-11-01 PROCEDURE — 71045 X-RAY EXAM CHEST 1 VIEW: CPT

## 2019-11-01 PROCEDURE — 74176 CT ABD & PELVIS W/O CONTRAST: CPT

## 2019-11-01 PROCEDURE — 99223 1ST HOSP IP/OBS HIGH 75: CPT

## 2019-11-01 PROCEDURE — 87186 SC STD MICRODIL/AGAR DIL: CPT

## 2019-11-01 PROCEDURE — 82962 GLUCOSE BLOOD TEST: CPT

## 2019-11-01 PROCEDURE — 99291 CRITICAL CARE FIRST HOUR: CPT

## 2019-11-01 PROCEDURE — 85730 THROMBOPLASTIN TIME PARTIAL: CPT

## 2019-11-01 PROCEDURE — 84484 ASSAY OF TROPONIN QUANT: CPT

## 2019-11-01 PROCEDURE — 84133 ASSAY OF URINE POTASSIUM: CPT

## 2019-11-01 PROCEDURE — 94640 AIRWAY INHALATION TREATMENT: CPT

## 2019-11-01 PROCEDURE — 82272 OCCULT BLD FECES 1-3 TESTS: CPT

## 2019-11-01 PROCEDURE — 84100 ASSAY OF PHOSPHORUS: CPT

## 2019-11-01 PROCEDURE — 36415 COLL VENOUS BLD VENIPUNCTURE: CPT

## 2019-11-01 PROCEDURE — 83935 ASSAY OF URINE OSMOLALITY: CPT

## 2019-11-01 PROCEDURE — 82570 ASSAY OF URINE CREATININE: CPT

## 2019-11-01 PROCEDURE — 87641 MR-STAPH DNA AMP PROBE: CPT

## 2019-11-01 PROCEDURE — 99232 SBSQ HOSP IP/OBS MODERATE 35: CPT

## 2019-11-01 PROCEDURE — 85610 PROTHROMBIN TIME: CPT

## 2019-11-01 PROCEDURE — 50432 PLMT NEPHROSTOMY CATHETER: CPT | Mod: RT

## 2019-11-01 PROCEDURE — 87040 BLOOD CULTURE FOR BACTERIA: CPT

## 2019-11-01 PROCEDURE — 99291 CRITICAL CARE FIRST HOUR: CPT | Mod: 25

## 2019-11-01 PROCEDURE — 84300 ASSAY OF URINE SODIUM: CPT

## 2019-11-01 PROCEDURE — 93005 ELECTROCARDIOGRAM TRACING: CPT

## 2019-11-01 PROCEDURE — 86901 BLOOD TYPING SEROLOGIC RH(D): CPT

## 2019-11-01 PROCEDURE — 86900 BLOOD TYPING SEROLOGIC ABO: CPT

## 2019-11-01 PROCEDURE — 82803 BLOOD GASES ANY COMBINATION: CPT

## 2019-11-01 PROCEDURE — 80048 BASIC METABOLIC PNL TOTAL CA: CPT

## 2019-11-01 PROCEDURE — 96375 TX/PRO/DX INJ NEW DRUG ADDON: CPT

## 2019-11-01 PROCEDURE — P9040: CPT

## 2019-11-01 PROCEDURE — 83605 ASSAY OF LACTIC ACID: CPT

## 2019-11-01 PROCEDURE — 71250 CT THORAX DX C-: CPT

## 2019-11-01 PROCEDURE — 86922 COMPATIBILITY TEST ANTIGLOB: CPT

## 2019-11-01 PROCEDURE — 80053 COMPREHEN METABOLIC PANEL: CPT

## 2019-11-01 PROCEDURE — 87150 DNA/RNA AMPLIFIED PROBE: CPT

## 2019-11-01 PROCEDURE — 96374 THER/PROPH/DIAG INJ IV PUSH: CPT

## 2019-11-01 PROCEDURE — 81001 URINALYSIS AUTO W/SCOPE: CPT

## 2019-11-01 RX ORDER — SODIUM CHLORIDE 9 MG/ML
1000 INJECTION, SOLUTION INTRAVENOUS
Refills: 0 | Status: DISCONTINUED | OUTPATIENT
Start: 2019-11-01 | End: 2019-11-02

## 2019-11-01 RX ORDER — MAGNESIUM SULFATE 500 MG/ML
2 VIAL (ML) INJECTION ONCE
Refills: 0 | Status: COMPLETED | OUTPATIENT
Start: 2019-11-01 | End: 2019-11-01

## 2019-11-01 RX ORDER — NOREPINEPHRINE BITARTRATE/D5W 8 MG/250ML
0.05 PLASTIC BAG, INJECTION (ML) INTRAVENOUS
Qty: 16 | Refills: 0 | Status: DISCONTINUED | OUTPATIENT
Start: 2019-11-01 | End: 2019-11-03

## 2019-11-01 RX ORDER — ACETAMINOPHEN 500 MG
1000 TABLET ORAL ONCE
Refills: 0 | Status: COMPLETED | OUTPATIENT
Start: 2019-11-01 | End: 2019-11-01

## 2019-11-01 RX ORDER — CHLORHEXIDINE GLUCONATE 213 G/1000ML
1 SOLUTION TOPICAL DAILY
Refills: 0 | Status: DISCONTINUED | OUTPATIENT
Start: 2019-11-01 | End: 2019-11-07

## 2019-11-01 RX ORDER — CEFEPIME 1 G/1
1000 INJECTION, POWDER, FOR SOLUTION INTRAMUSCULAR; INTRAVENOUS EVERY 24 HOURS
Refills: 0 | Status: DISCONTINUED | OUTPATIENT
Start: 2019-11-01 | End: 2019-11-06

## 2019-11-01 RX ORDER — FUROSEMIDE 40 MG
40 TABLET ORAL ONCE
Refills: 0 | Status: COMPLETED | OUTPATIENT
Start: 2019-11-01 | End: 2019-11-01

## 2019-11-01 RX ADMIN — CHLORHEXIDINE GLUCONATE 1 APPLICATION(S): 213 SOLUTION TOPICAL at 06:02

## 2019-11-01 RX ADMIN — CEFEPIME 100 MILLIGRAM(S): 1 INJECTION, POWDER, FOR SOLUTION INTRAMUSCULAR; INTRAVENOUS at 06:02

## 2019-11-01 RX ADMIN — Medication 50 GRAM(S): at 08:04

## 2019-11-01 RX ADMIN — HYDROMORPHONE HYDROCHLORIDE 0.5 MILLIGRAM(S): 2 INJECTION INTRAMUSCULAR; INTRAVENOUS; SUBCUTANEOUS at 00:00

## 2019-11-01 RX ADMIN — PANTOPRAZOLE SODIUM 40 MILLIGRAM(S): 20 TABLET, DELAYED RELEASE ORAL at 06:02

## 2019-11-01 RX ADMIN — SODIUM CHLORIDE 100 MILLILITER(S): 9 INJECTION, SOLUTION INTRAVENOUS at 18:51

## 2019-11-01 RX ADMIN — Medication 1000 MILLIGRAM(S): at 16:40

## 2019-11-01 RX ADMIN — HYDROMORPHONE HYDROCHLORIDE 0.5 MILLIGRAM(S): 2 INJECTION INTRAMUSCULAR; INTRAVENOUS; SUBCUTANEOUS at 08:29

## 2019-11-01 RX ADMIN — Medication 40 MILLIGRAM(S): at 04:54

## 2019-11-01 RX ADMIN — Medication 400 MILLIGRAM(S): at 15:38

## 2019-11-01 RX ADMIN — Medication 4.5 MICROGRAM(S)/KG/MIN: at 14:34

## 2019-11-01 RX ADMIN — HYDROMORPHONE HYDROCHLORIDE 0.5 MILLIGRAM(S): 2 INJECTION INTRAMUSCULAR; INTRAVENOUS; SUBCUTANEOUS at 08:44

## 2019-11-01 NOTE — PROGRESS NOTE ADULT - SUBJECTIVE AND OBJECTIVE BOX
Patient seen and examined at bedside in ICU.  On pressor x1.   Admits to abdominal pain.     Vital Signs Last 24 Hrs  T(F): 96.2 (11-01-19 @ 07:30), Max: 99.4 (10-31-19 @ 09:27)  HR: 83 (11-01-19 @ 08:30)  BP: 122/62 (11-01-19 @ 08:30)  RR: 17 (11-01-19 @ 08:30)  SpO2: 98% (11-01-19 @ 08:30)  POCT Blood Glucose.: 133 mg/dL (31 Oct 2019 20:57)    GENERAL: Alert, NAD  CHEST/LUNG: respirations nonlabored  ABDOMEN: soft, mild abdominal tenderness, Nondistended  : leslie catheter in place draining yellow, purulent urine: 463ml/24hours   EXTREMITIES:  no calf tenderness, No edema    I&O's Detail    31 Oct 2019 07:01  -  01 Nov 2019 07:00  --------------------------------------------------------  IN:    Lactated Ringers IV Bolus: 1000 mL    lactated ringers.: 1200 mL    norepinephrine Infusion: 165.8 mL    Oral Fluid: 300 mL    pantoprazole Infusion: 30 mL    phenylephrine   Infusion: 93.6 mL    Solution: 100 mL    Solution: 200 mL    Solution: 200 mL  Total IN: 3289.4 mL    OUT:    Indwelling Catheter - Urethral: 463 mL  Total OUT: 463 mL    Total NET: 2826.4 mL    LABS:                        11.4   12.57 )-----------( 55       ( 01 Nov 2019 06:20 )             37.0     11-01    138  |  111<H>  |  39<H>  ----------------------------<  143<H>  3.7   |  17<L>  |  3.27<H>    Ca    8.1<L>      01 Nov 2019 06:20  Phos  2.8     11-01  Mg     1.5     11-01    TPro  5.6<L>  /  Alb  2.0<L>  /  TBili  1.8<H>  /  DBili  x   /  AST  30  /  ALT  26  /  AlkPhos  134<H>  11-01    PT/INR - ( 01 Nov 2019 06:20 )   PT: 14.8 sec;   INR: 1.32 ratio       PTT - ( 01 Nov 2019 06:20 )  PTT:30.1 sec

## 2019-11-01 NOTE — PROGRESS NOTE ADULT - PROBLEM SELECTOR PLAN 1
- two episodes dark stools at [facility]; will cont to monitor while here at [facility]  - Hgb remains stable; cont to trend  - would continue to treat with Protonix 40mg IVP   - avoid nsaids   - continue to monitor stool color and keep stools soft with miralax and suppositories prn  - no acute role for endoscopic evaluation   - will cont to follow  - cont care per micu/appreciated

## 2019-11-01 NOTE — H&P ADULT - ATTENDING COMMENTS
1Septic shock from uti from obstructive uropathy. Pt found to have kidney stones. Pt transferred to [facility] for IR percutaneous nephrostomy. Pt on norepinephrine for hypotensive. Started on cefepime.   1. Septic shock. Taper norepinephrine for MAP >65.  2. Obstructive uropathy from renal stones  . Pt with R hydronephrosis and congenitally small R kidney. For IR nephrostomy.  3. UTI. Continue cefepime. Await UCX at [facility].  4. Thrombocytopenia from sepsis. Continue to follow.    I have examined pt at bedside with residents. I have spent 35 min cc time excluding procedures.

## 2019-11-01 NOTE — DISCHARGE NOTE NURSING/CASE MANAGEMENT/SOCIAL WORK - PATIENT PORTAL LINK FT
You can access the FollowMyHealth Patient Portal offered by [facility] by registering at the following website: [email or web address]. By joining [employer]’s FollowMyHealth portal, you will also be able to view your health information using other applications (apps) compatible with our system.

## 2019-11-01 NOTE — CHART NOTE - NSCHARTNOTEFT_GEN_A_CORE
PRE-INTERVENTIONAL RADIOLOGY PROCEDURE NOTE      Patient Age: 74    Patient Gender: M     Procedure: Hx R congenital atrophic kidney, found to have R distal ureteral stones    Diagnosis/Indication: -IR planning percutaneous nephrostomy for drainage w Dr. Escobedo    Interventional Radiology Attending Physician: Dr. Sanabria    Ordering Attending Physician: Dr. Sanabria     Pertinent Medical History:  74M PMHx CAD s/p stent, HTN, HLD, hypothyroidism, congenital atrophy of Rt kidney, previous kidney stones, obesity, with R hydronephrosis and urosepsis 2/2 R distal ureteral stones.     Pertinent labs:                      11.4   12.57 )-----------( 55       ( 01 Nov 2019 06:20 )             37.0       11-01    138  |  111<H>  |  39<H>  ----------------------------<  143<H>  3.7   |  17<L>  |  3.27<H>    Ca    8.1<L>      01 Nov 2019 06:20  Phos  2.8     11-01  Mg     1.5     11-01    TPro  5.6<L>  /  Alb  2.0<L>  /  TBili  1.8<H>  /  DBili  x   /  AST  30  /  ALT  26  /  AlkPhos  134<H>  11-01      PT/INR - ( 01 Nov 2019 06:20 )   PT: 14.8 sec;   INR: 1.32 ratio         PTT - ( 01 Nov 2019 06:20 )  PTT:30.1 sec        Patient and Family Aware ? Yes

## 2019-11-01 NOTE — H&P ADULT - NSHPLABSRESULTS_GEN_ALL_CORE
LABS:                        11.4   12.57 )-----------( 55       ( 01 Nov 2019 06:20 )             37.0     11-01    138  |  111<H>  |  39<H>  ----------------------------<  143<H>  3.7   |  17<L>  |  3.27<H>    Ca    8.1<L>      01 Nov 2019 06:20  Phos  2.8     11-01  Mg     1.5     11-01    TPro  5.6<L>  /  Alb  2.0<L>  /  TBili  1.8<H>  /  DBili  x   /  AST  30  /  ALT  26  /  AlkPhos  134<H>  11-01    PT/INR - ( 01 Nov 2019 06:20 )   PT: 14.8 sec;   INR: 1.32 ratio         PTT - ( 01 Nov 2019 06:20 )  PTT:30.1 sec

## 2019-11-01 NOTE — H&P ADULT - HISTORY OF PRESENT ILLNESS
Pt is as 75y/o M, who presented to [facility] on 10/31 from home, with PMHx of CAD s/p 1 stent, prediabetes, HLD, HTN, Hypothyroidism, congenital atrophic Rt kidney, Renal Stones, and Obesity who presents to ED with Abdominal pain x 2 days.  Pt states yesterday he developed diffuse, 8/10, constant, sharp, non-radiating pain exacerbated when flat, and improved when sitting forward.  Pt reports assoc SOB 2/2 pain.  Pt states pain started when sitting on couch watching TV.  On admission, pt denies fever, chills, nausea, vomiting, HA, dizziness, recent illness, sick contacts, recent antibiotic use, or NSAID exposure.  Pt states he underwent colonoscopy/endoscopy 2 years prior which was only significant for hemorrhoids.  Pt denies CP, palpitations, rash, extremity edema, cough, weakness, numbness, or syncope.   In [facility] ED, pt presents in mod painful distress, vitals sig for BP 87/51, HR 96, remaining vitals WNL, and EKG NSR.   UA positive- Urine appears purulent.  CT chest and abdomen performed demonstrated mod to severe rt hydroureteronephrosis with obstructing R distal ureteral calculi, cystitis, and external iliac lymphadenopathy.   Patient s/p ceftriazone 1g, cefepime 2g, 1L LR. Patient started on norepinephrine.   Given hx of congenital atrophic R kidney, arrangements were made to perform IR procedure for ureteral stones at [facility] with Dr. Escobedo.

## 2019-11-01 NOTE — H&P ADULT - NSHPPHYSICALEXAM_GEN_ALL_CORE
PHYSICAL EXAM:  GENERAL: NAD, lying in bed comfortably  HEAD:  Atraumatic, Normocephalic, ecchymoses at chin  EYES: EOMI, PERRLA, conjunctiva and sclera clear  ENT: Moist mucous membranes  NECK: Supple, No JVD  CHEST/LUNG: Clear to auscultation bilaterally; No rales, rhonchi, wheezing, or rubs. Unlabored respirations  HEART: Regular rate and rhythm; No murmurs, rubs, or gallops  ABDOMEN: +distension, +R flank pain  EXTREMITIES:  2+ Peripheral Pulses, brisk capillary refill. No clubbing, cyanosis, or edema  NERVOUS SYSTEM:  Alert & Oriented X3, speech clear. No deficits   MSK: FROM all 4 extremities, full and equal strength  SKIN: ecchymosis at chin

## 2019-11-01 NOTE — H&P ADULT - NSHPREVIEWOFSYSTEMS_GEN_ALL_CORE
REVIEW OF SYSTEMS:    CONSTITUTIONAL: No weakness, fevers or chills, +mild headache  EYES/ENT: No visual changes;  No vertigo or throat pain   NECK: No pain or stiffness  RESPIRATORY: No cough, wheezing, hemoptysis; No shortness of breath  CARDIOVASCULAR: No chest pain or palpitations  GASTROINTESTINAL: +R flank pain. No nausea, vomiting, or hematemesis; No diarrhea or constipation. No melena or hematochezia.  GENITOURINARY: +dysuria, +R flank pain  NEUROLOGICAL: No numbness or weakness  SKIN: No itching, rashes

## 2019-11-01 NOTE — H&P ADULT - ASSESSMENT
Assessment:   74M PMHx CAD s/p stent, HTN, HLD, hypothyroidism, congenital atrophy of Rt kidney, previous kidney stones, obesity, with R hydronephrosis and urosepsis 2/2 R distal ureteral stones.   Plan:  Neuro:  -Patient AAOx3  -c/w dilaudid 0.5mg PRN for pain control   CV  -Patient w CAD s/p stent, HTN, HLD  -Shock etiology likely multifactorial (septic, hypovolemic shock)  -Septic Shock 2/2 urosepsis c/b obstructive uropathy  -hypovolemic shock 2/2 ?GI bleed (baseline Hgb 11, p/w Hgb 9.6) as patient w reported melanotic stool and FOBT positive.   -s/p 4L crystalloid, on Levophed, titrate to maintain MAP of 65, wean as tolerated   -s/p 3u PRBC, 1u platelets.   -monitor fluid status   -HTN- Hold antihypertensives in the setting of shock  -HLD- c/w statin therapy   -CAD- Hold asa in setting of thrombocytopenia  Respiratory  -currently on RA, supplemental oxygen via NC as needed  -CT scan demonstrates ANNA lung nodules; will need OP f/u.     GI  -patient reports melena x2 days w +FOBT.  -CT scan demonstrates colonic diverticulosis w/out diverticulitis  -s/p 3u PRBC, 1u platelets in [facility]   -trend H&H (CBC q12hr)  -observe BM   -GI consult in AM    Renal/  -s/p 4L fluids at [facility]  -Hx R congenital atrophic kidney, found to have R distal ureteral stones  -CT findings c/w cystitis  -IR planning percutaneous nephrostomy for drainage w Dr. Escobedo  -Urology consult in AM  Heme:   -Patient w hypovolemia, anemia in the setting of reported melanotic stool  -Patient s/p 3u PRBC, 1u platelets.   -Trend H&H (CBC q12hr)  -monitor coags  -active T&S  ID  -UA+  -f/u BCx, UCx at [facility]  -s/p 1g ceftriaxone, 2g cefepime at [facility]  -c/w cefepime pending sensitivities   Endo:   -Prediabetes  -f/u HbA1C    DVT prophylaxis:  -holding AC in setting of GI bleed  -SCDs

## 2019-11-01 NOTE — PROGRESS NOTE ADULT - ASSESSMENT
74 year old male with septic shock secondary to UTI, GNR bacteremia and obstructing right ureteral calculi, worsening HALEY, hx congenital atrophic right kidney. Pressor x1. Leukocytosis. Anemia improved s/p 3U PRBC, Worsening Thrombocytopenia. Fecal occult blood +    - transfer pending for IR for placement of urgent right nephrostomy tube  - continue IV antibiotics  - urine and blood cultures pending, will follow up results  - continue to trend BUN/Cr  - continue leslie catheter, monitor urine output  - recommend IVF  - continue pressor support per ICU  - continue ICU care  - discussed with Dr. Meza

## 2019-11-01 NOTE — PROGRESS NOTE ADULT - ASSESSMENT
Pt is as 73y/o M, from home, lives w/ wife, with PMHx of CAD s/p 1 stent , pre Diabetes, HLD, HTN, Hypothyroidism, congenital atrophic Rt kidney, Renal Stones, and Obesity who presents to ED with Abdominal pain x 2 days.  Pt states yesterday he developed diffuse, 8/10, constant, sharp, non-radiating pain exacerbated when flat, and improved when sitting forward.  Pt reports assoc SOB 2/2 pain.  Pt states pain started when sitting on couch watching TV.  He took two baby asa with no relief.  Pt denies similar pain in the past, and states he suffers from chronic constipation- last BM yesterday evening appeared dark.  Pt denies diarrhea, but states he had 2 episodes of dark formed stool since yesterday.  Pt last took his metoprolol yesterday. Pt denies fever, chills, nausea, vomiting, HA, dizziness, recent illness, sick contacts, recent antibiotic use, or NSAID exposure.  Pt states he underwent colonoscopy/ endoscopy 2 years prior which was only significant for hemorrhoids.  Pt denies CP,  palpitations, rash, extremity edema, cough, weakness, numbness, or syncope.     In the ED, pt presents in mod painful distress, vitals sig for BP 87/51, HR 96,  remaining vitals WNL, and EKG NSR.  Pt presents pancytopenic with WBC 1.59, PLt 82, and Hb 9.7 (baseline 12).  Chest xray appears clear.  UA positive- Urine appears purulent.  CT chest and abdomen performed concerning for mod to severe rt hydroureteronephrosis with obstructing ureteral calculi, cystitis, and external iliac lymphadenopathy.    Pt will be admitted to ICU with concern for Septic Shock s/s to obstructive uropathy    Plan:  Neuro:  -Currently AO x 3 at baseline mental status   -Moderate pain- will continue low dose Dilaudid in setting of HALEY and hypotension       CV:  -Septic Shock 2/2 obstructive uropathy requiring to pressor support- will continue to monitor hemodynamic stability and taper pressor as indicated    s/p 3L NS in ED, started on low dose phenylephrine, will switch to Levophed when central line is placed- taper as tolerated to maintain MAP of 65   will place central line for pressor support   -HTN- Hold antihypertensives in the setting of shock- resume as clinically indicated   -HLD- c/w statin therapy   -CAD- Hold asa in setting of thrombocytopenia   -Cardio Consulted: Dr. Fonseca     Pulm:  -Chest CT neg for acute pulmonary process  -Mild SOB 2/2 pain - saturating 100% on 2L ncann  -s/p lasix 40mg overnight   -    ID:  -Septic Shock 2/2 UTI/ Obstructive Uropathy requiring pressor support   c/w maxipime   repeat cultures in 24 hours      Nephro:  -HALEY on CKD stage with worsening kidney function likely in the setting obstructive uropathy and UTI  C/w urine output monitoring    -Obstructive Uropathy- Rt hydroureteronephrosis with ureteral stones   s/p 1g Rocephin in ED, will continue IV Cefepime empirically  Blood cultures positive for E Coli  c/w antibiotics   Tranfer for nephrostomy tube to decompress hydronephrosis    GI:  -Abdominal pain- likely 2/2 UTI  CT abdomen negative for acute intra-abdominal process  C/w pain medication and bowel regimen       Heme:  -Pancytopenia on admission likely 2/2 sepsis UTI   c/w antibiotic regimen and monitor CBC daily     -Anemia  s/p 2 prbc transfusion  Hb stable, no acute bleeding noted  c/w pantoprazole iv bid  GI Dr Reyna      Endo:  -target CBG < 180    Prophy:  - Hold chemical DVT prophy in setting of anemia and thrombocytopenia     Dispo:  -Plan for transfer to [facility] for nephrostomy tube placement     GOC   FULL CODE

## 2019-11-01 NOTE — CHART NOTE - NSCHARTNOTEFT_GEN_A_CORE
Vascular & Interventional Radiology Post-Procedure Note    Pre-Procedure Diagnosis: obstructing right ureteral stone with urosepsis  Post-Procedure Diagnosis: Same as pre.  Indications for Procedure: urosepsis.     Attending: Dr. Sanabria  Resident: Dr. Ortiz    Procedure Details/Findings: Right nephrostomy tube placed.   Access (if applicable): right flank    Complications: none  Estimated Blood Loss: Minimal  Specimen: sent for cultures, approximately 30cc of viscous yellow fluid aspirated  Contrast: 8cc  Sedation: yes  Patient Condition/Disposition: Stable, Back to MICU    Plan:   - f/u cultures,   - given appearance of fluid would continue with Abx  - monitor output.   - care as per ICU team. Vascular & Interventional Radiology Post-Procedure Note    Pre-Procedure Diagnosis: obstructing right ureteral stone with urosepsis  Post-Procedure Diagnosis: Same as pre.  Indications for Procedure: urosepsis.     Attending: Dr. Sanabria  Resident: Dr. Ortiz    Procedure Details/Findings: Right nephrostomy tube placed.   Access (if applicable): right flank    Complications: none  Estimated Blood Loss: Minimal  Specimen: sent for cultures, approximately 30cc of purulent urine aspirated  Contrast: 8cc  Sedation: yes  Patient Condition/Disposition: Stable, Back to MICU    Plan:   - f/u cultures,   - given appearance of fluid would continue with Abx  - monitor output.   - care as per ICU team.

## 2019-11-01 NOTE — PROGRESS NOTE ADULT - SUBJECTIVE AND OBJECTIVE BOX
INTERVAL HPI/OVERNIGHT EVENTS:    seen with family in the ICU  pt reports feeling well   he endorsed loose stools overnight after suppository and enema; no longer constipated but unsure of color   hgb remains stable    MEDICATIONS  (STANDING):  chlorhexidine 4% Liquid 1 Application(s) Topical daily  norepinephrine Infusion 0.05 MICROgram(s)/kG/Min (4.5 mL/Hr) IV Continuous <Continuous>    MEDICATIONS  (PRN):      Allergies    No Known Allergies    Intolerances        Review of Systems:    General:  No wt loss, fevers, chills, night sweats, fatigue   Eyes:  Good vision, no reported pain  ENT:  No sore throat, pain, runny nose, dysphagia  CV:  No pain, palpitations, hypo/hypertension  Resp:  No dyspnea, cough, tachypnea, wheezing  GI:  No pain, No nausea, No vomiting, No diarrhea, No constipation, No weight loss, No fever, No pruritis, No rectal bleeding, No melena, No dysphagia  :  No pain, bleeding, incontinence, nocturia  Muscle:  No pain, weakness  Neuro:  No weakness, tingling, memory problems  Psych:  No fatigue, insomnia, mood problems, depression  Endocrine:  No polyuria, polydypsia, cold/heat intolerance  Heme:  No petechiae, ecchymosis, easy bruisability  Skin:  No rash, tattoos, scars, edema      Vital Signs Last 24 Hrs  T(C): 35.7 (2019 07:30), Max: 36.8 (2019 00:02)  T(F): 96.2 (2019 07:30), Max: 98.3 (2019 00:02)  HR: 86 (2019 14:00) (76 - 93)  BP: 125/63 (2019 14:00) (72/37 - 126/74)  BP(mean): 81 (2019 14:00) (43 - 88)  RR: 21 (2019 14:00) (13 - 28)  SpO2: 98% (2019 14:00) (94% - 100%)    PHYSICAL EXAM:    Constitutional: NAD  HEENT: EOMI, throat clear  Neck: No LAD, supple  Respiratory: CTA and P  Cardiovascular: S1 and S2, RRR, no M  Gastrointestinal: BS+, soft, NT/ND, neg HSM,  Extremities: No peripheral edema, neg clubbing, cyanosis  Vascular: 2+ peripheral pulses  Neurological: A/O x 3, no focal deficits  Psychiatric: Normal mood, normal affect  Skin: No rashes      LABS:                        11.7   11.72 )-----------( x        ( 2019 13:55 )             39.0         138  |  111<H>  |  39<H>  ----------------------------<  143<H>  3.7   |  17<L>  |  3.27<H>    Ca    8.1<L>      2019 06:20  Phos  2.8       Mg     1.5         TPro  5.6<L>  /  Alb  2.0<L>  /  TBili  1.8<H>  /  DBili  x   /  AST  30  /  ALT  26  /  AlkPhos  134<H>      PT/INR - ( 2019 06:20 )   PT: 14.8 sec;   INR: 1.32 ratio         PTT - ( 2019 06:20 )  PTT:30.1 sec  Urinalysis Basic - ( 31 Oct 2019 08:51 )    Color: Yellow / Appearance: very cloudy / S.015 / pH: x  Gluc: x / Ketone: Trace  / Bili: Negative / Urobili: 1   Blood: x / Protein: 500 mg/dL / Nitrite: Positive   Leuk Esterase: Moderate / RBC: 10-25 /HPF / WBC >50 /HPF   Sq Epi: x / Non Sq Epi: Negative /HPF / Bacteria: Many /HPF        RADIOLOGY & ADDITIONAL TESTS:

## 2019-11-01 NOTE — PROGRESS NOTE ADULT - SUBJECTIVE AND OBJECTIVE BOX
INTERVAL HPI/OVERNIGHT EVENTS: Pt was planned for transfer to [facility] overnight, waiting for bed. Pt was mildly short of breath, urine out-put improved after lasix.    PRESSORS: [ x] YES [ ] NO      Antimicrobial:  cefepime   IVPB 1000 milliGRAM(s) IV Intermittent every 12 hours    Cardiovascular:  norepinephrine Infusion 0.05 MICROgram(s)/kG/Min IV Continuous <Continuous>    Pulmonary:    Hematalogic:    Other:  chlorhexidine 4% Liquid 1 Application(s) Topical <User Schedule>  HYDROmorphone  Injectable 0.5 milliGRAM(s) IV Push every 4 hours PRN  influenza   Vaccine 0.5 milliLiter(s) IntraMuscular once  pantoprazole  Injectable 40 milliGRAM(s) IV Push every 12 hours  sodium chloride 0.9% lock flush 10 milliLiter(s) IV Push every 1 hour PRN    cefepime   IVPB 1000 milliGRAM(s) IV Intermittent every 12 hours  chlorhexidine 4% Liquid 1 Application(s) Topical <User Schedule>  HYDROmorphone  Injectable 0.5 milliGRAM(s) IV Push every 4 hours PRN  influenza   Vaccine 0.5 milliLiter(s) IntraMuscular once  norepinephrine Infusion 0.05 MICROgram(s)/kG/Min IV Continuous <Continuous>  pantoprazole  Injectable 40 milliGRAM(s) IV Push every 12 hours  sodium chloride 0.9% lock flush 10 milliLiter(s) IV Push every 1 hour PRN    Drug Dosing Weight  Height (cm): 167.64 (31 Oct 2019 06:58)  Weight (kg): 93.7 (31 Oct 2019 09:42)  BMI (kg/m2): 33.3 (31 Oct 2019 09:42)  BSA (m2): 2.03 (31 Oct 2019 09:42)    CENTRAL LINE: [ ] YES [ ] NO  LOCATION:   DATE INSERTED:  REMOVE: [ ] YES [ ] NO  EXPLAIN:    SYKES: [ ] YES [ ] NO    DATE INSERTED:  REMOVE:  [ ] YES [ ] NO  EXPLAIN:    A-LINE:  [ ] YES [ ] NO  LOCATION:   DATE INSERTED:  REMOVE:  [ ] YES [ ] NO  EXPLAIN:      ICU Vital Signs Last 24 Hrs  T(C): 35.7 (2019 07:30), Max: 36.8 (2019 00:02)  T(F): 96.2 (2019 07:30), Max: 98.3 (2019 00:02)  HR: 82 (2019 10:30) (76 - 93)  BP: 112/59 (2019 10:30) (72/37 - 125/63)  BP(mean): 73 (2019 10:30) (43 - 81)  ABP: --  ABP(mean): --  RR: 20 (2019 10:30) (13 - 28)  SpO2: 98% (2019 10:30) (94% - 100%)            10-31 @ 07:  -   @ 07:00  --------------------------------------------------------  IN: 3289.4 mL / OUT: 563 mL / NET: 2726.4 mL      PHYSICAL EXAM:    GENERAL: NAD, appears comfortable  HEAD:  Atraumatic, Normocephalic  EYES: EOMI, PERRLA, conjunctiva and sclera clear  ENMT: No tonsillar erythema, exudates, or enlargement; Moist mucous membranes, Good dentition, No lesions  NECK: Supple, normal appearance, No JVD; Normal thyroid; Trachea midline  NERVOUS SYSTEM:  Alert & Oriented X3, Good concentration; Motor Strength 5/5 B/L upper and lower extremities; DTRs 2+ intact and symmetric  CHEST/LUNG: bibasalar crackles, good air entry  HEART: Regular rate and rhythm; No murmurs, rubs, or gallops  ABDOMEN: Soft, Nontender, Nondistended; Bowel sounds present  EXTREMITIES:  2+ Peripheral Pulses, No clubbing, cyanosis, or edema  LYMPH: No lymphadenopathy noted  SKIN: No rashes or lesions; Good capillary refill      LABS:  CBC Full  -  ( 2019 06:20 )  WBC Count : 12.57 K/uL  RBC Count : 4.36 M/uL  Hemoglobin : 11.4 g/dL  Hematocrit : 37.0 %  Platelet Count - Automated : 55 K/uL  Mean Cell Volume : 84.9 fl  Mean Cell Hemoglobin : 26.1 pg  Mean Cell Hemoglobin Concentration : 30.8 gm/dL  Auto Neutrophil # : SEE NOTE K/uL  Auto Lymphocyte # : SEE NOTE K/uL  Auto Monocyte # : SEE NOTE K/uL  Auto Eosinophil # : SEE NOTE K/uL  Auto Basophil # : SEE NOTE K/uL  Auto Neutrophil % : SEE NOTE %  Auto Lymphocyte % : SEE NOTE %  Auto Monocyte % : SEE NOTE %  Auto Eosinophil % : SEE NOTE %  Auto Basophil % : SEE NOTE %        138  |  111<H>  |  39<H>  ----------------------------<  143<H>  3.7   |  17<L>  |  3.27<H>    Ca    8.1<L>      2019 06:20  Phos  2.8       Mg     1.5         TPro  5.6<L>  /  Alb  2.0<L>  /  TBili  1.8<H>  /  DBili  x   /  AST  30  /  ALT  26  /  AlkPhos  134<H>      PT/INR - ( 2019 06:20 )   PT: 14.8 sec;   INR: 1.32 ratio         PTT - ( 2019 06:20 )  PTT:30.1 sec  Urinalysis Basic - ( 31 Oct 2019 08:51 )    Color: Yellow / Appearance: very cloudy / S.015 / pH: x  Gluc: x / Ketone: Trace  / Bili: Negative / Urobili: 1   Blood: x / Protein: 500 mg/dL / Nitrite: Positive   Leuk Esterase: Moderate / RBC: 10-25 /HPF / WBC >50 /HPF   Sq Epi: x / Non Sq Epi: Negative /HPF / Bacteria: Many /HPF      Culture Results:   Growth in anaerobic bottle: Gram Negative Rods  Growth in aerobic bottle: Gram Negative Rods  "Due to technical problems, Proteus sp. will Not be reported as part of  the BCID panel until further notice"  ***Blood Panel PCR results on this specimenare available  approximately 3 hours after the Gram stain result.***  Gram stain, PCR, and/or culture results may not always  correspond due to difference in methodologies.  ************************************************************  This PCR assaywas performed using [employer].  The following targets are tested for: Enterococcus,  vancomycin resistant enterococci, Listeria monocytogenes,  coagulase negative staphylococci, S. aureus,  methicillin resistant S. aureus, Streptococcus agalactiae  (Group B), S. pneumoniae, S. pyogenes (Group A),  Acinetobacter baumannii, Enterobacter cloacae, E. coli,  Klebsiella oxytoca, K. pneumoniae, Proteus sp.,  Serratia marcescens, Haemophilus influenzae,  Neisseria meningitidis, Pseudomonas aeruginosa, Candida  albicans, C. glabrata, C krusei, C parapsilosis,  C. tropicalis and the KPC resistance gene. (10-31 @ 10:58)  Culture Results:   No growth to date. (10-31 @ 10:58)      RADIOLOGY & ADDITIONAL STUDIES REVIEWED:  ***    [ ]GOALS OF CARE DISCUSSION WITH PATIENT/FAMILY/PROXY:    CRITICAL CARE TIME SPENT: 35 minutes

## 2019-11-02 LAB
-  AMIKACIN: SIGNIFICANT CHANGE UP
-  AMIKACIN: SIGNIFICANT CHANGE UP
-  AMPICILLIN/SULBACTAM: SIGNIFICANT CHANGE UP
-  AMPICILLIN/SULBACTAM: SIGNIFICANT CHANGE UP
-  AMPICILLIN: SIGNIFICANT CHANGE UP
-  AMPICILLIN: SIGNIFICANT CHANGE UP
-  AZTREONAM: SIGNIFICANT CHANGE UP
-  AZTREONAM: SIGNIFICANT CHANGE UP
-  CEFAZOLIN: SIGNIFICANT CHANGE UP
-  CEFAZOLIN: SIGNIFICANT CHANGE UP
-  CEFEPIME: SIGNIFICANT CHANGE UP
-  CEFEPIME: SIGNIFICANT CHANGE UP
-  CEFOXITIN: SIGNIFICANT CHANGE UP
-  CEFOXITIN: SIGNIFICANT CHANGE UP
-  CEFTRIAXONE: SIGNIFICANT CHANGE UP
-  CEFTRIAXONE: SIGNIFICANT CHANGE UP
-  CIPROFLOXACIN: SIGNIFICANT CHANGE UP
-  CIPROFLOXACIN: SIGNIFICANT CHANGE UP
-  ERTAPENEM: SIGNIFICANT CHANGE UP
-  GENTAMICIN: SIGNIFICANT CHANGE UP
-  GENTAMICIN: SIGNIFICANT CHANGE UP
-  IMIPENEM: SIGNIFICANT CHANGE UP
-  IMIPENEM: SIGNIFICANT CHANGE UP
-  LEVOFLOXACIN: SIGNIFICANT CHANGE UP
-  LEVOFLOXACIN: SIGNIFICANT CHANGE UP
-  MEROPENEM: SIGNIFICANT CHANGE UP
-  MEROPENEM: SIGNIFICANT CHANGE UP
-  NITROFURANTOIN: SIGNIFICANT CHANGE UP
-  PIPERACILLIN/TAZOBACTAM: SIGNIFICANT CHANGE UP
-  PIPERACILLIN/TAZOBACTAM: SIGNIFICANT CHANGE UP
-  TIGECYCLINE: SIGNIFICANT CHANGE UP
-  TOBRAMYCIN: SIGNIFICANT CHANGE UP
-  TOBRAMYCIN: SIGNIFICANT CHANGE UP
-  TRIMETHOPRIM/SULFAMETHOXAZOLE: SIGNIFICANT CHANGE UP
-  TRIMETHOPRIM/SULFAMETHOXAZOLE: SIGNIFICANT CHANGE UP
ALBUMIN SERPL ELPH-MCNC: 2.4 G/DL — LOW (ref 3.3–5)
ALP SERPL-CCNC: 136 U/L — HIGH (ref 40–120)
ALT FLD-CCNC: 18 U/L — SIGNIFICANT CHANGE UP (ref 4–41)
ANION GAP SERPL CALC-SCNC: 15 MMO/L — HIGH (ref 7–14)
APTT BLD: 22.2 SEC — LOW (ref 27.5–36.3)
AST SERPL-CCNC: 24 U/L — SIGNIFICANT CHANGE UP (ref 4–40)
BILIRUB SERPL-MCNC: 1.1 MG/DL — SIGNIFICANT CHANGE UP (ref 0.2–1.2)
BUN SERPL-MCNC: 44 MG/DL — HIGH (ref 7–23)
CALCIUM SERPL-MCNC: 8.7 MG/DL — SIGNIFICANT CHANGE UP (ref 8.4–10.5)
CHLORIDE SERPL-SCNC: 107 MMOL/L — SIGNIFICANT CHANGE UP (ref 98–107)
CO2 SERPL-SCNC: 16 MMOL/L — LOW (ref 22–31)
CREAT SERPL-MCNC: 3.25 MG/DL — HIGH (ref 0.5–1.3)
CULTURE RESULTS: SIGNIFICANT CHANGE UP
CULTURE RESULTS: SIGNIFICANT CHANGE UP
GLUCOSE SERPL-MCNC: 116 MG/DL — HIGH (ref 70–99)
HBA1C BLD-MCNC: 5.6 % — SIGNIFICANT CHANGE UP (ref 4–5.6)
HCT VFR BLD CALC: 36 % — LOW (ref 39–50)
HGB BLD-MCNC: 11.5 G/DL — LOW (ref 13–17)
INR BLD: 1.25 — HIGH (ref 0.88–1.17)
MAGNESIUM SERPL-MCNC: 2 MG/DL — SIGNIFICANT CHANGE UP (ref 1.6–2.6)
MCHC RBC-ENTMCNC: 26.2 PG — LOW (ref 27–34)
MCHC RBC-ENTMCNC: 31.9 % — LOW (ref 32–36)
MCV RBC AUTO: 82 FL — SIGNIFICANT CHANGE UP (ref 80–100)
METHOD TYPE: SIGNIFICANT CHANGE UP
METHOD TYPE: SIGNIFICANT CHANGE UP
MRSA PCR RESULT.: SIGNIFICANT CHANGE UP
NRBC # FLD: 0.03 K/UL — SIGNIFICANT CHANGE UP (ref 0–0)
ORGANISM # SPEC MICROSCOPIC CNT: SIGNIFICANT CHANGE UP
PHOSPHATE SERPL-MCNC: 2.3 MG/DL — LOW (ref 2.5–4.5)
PLATELET # BLD AUTO: 80 K/UL — LOW (ref 150–400)
PMV BLD: 11.7 FL — SIGNIFICANT CHANGE UP (ref 7–13)
POTASSIUM SERPL-MCNC: 3.7 MMOL/L — SIGNIFICANT CHANGE UP (ref 3.5–5.3)
POTASSIUM SERPL-SCNC: 3.7 MMOL/L — SIGNIFICANT CHANGE UP (ref 3.5–5.3)
PROT SERPL-MCNC: 5.8 G/DL — LOW (ref 6–8.3)
PROTHROM AB SERPL-ACNC: 14.4 SEC — HIGH (ref 9.8–13.1)
RBC # BLD: 4.39 M/UL — SIGNIFICANT CHANGE UP (ref 4.2–5.8)
RBC # FLD: 16 % — HIGH (ref 10.3–14.5)
S AUREUS DNA NOSE QL NAA+PROBE: DETECTED
SODIUM SERPL-SCNC: 138 MMOL/L — SIGNIFICANT CHANGE UP (ref 135–145)
SPECIMEN SOURCE: SIGNIFICANT CHANGE UP
WBC # BLD: 12.39 K/UL — HIGH (ref 3.8–10.5)
WBC # FLD AUTO: 12.39 K/UL — HIGH (ref 3.8–10.5)

## 2019-11-02 PROCEDURE — 99291 CRITICAL CARE FIRST HOUR: CPT

## 2019-11-02 RX ORDER — IPRATROPIUM/ALBUTEROL SULFATE 18-103MCG
3 AEROSOL WITH ADAPTER (GRAM) INHALATION EVERY 4 HOURS
Refills: 0 | Status: DISCONTINUED | OUTPATIENT
Start: 2019-11-02 | End: 2019-11-07

## 2019-11-02 RX ORDER — SIMETHICONE 80 MG/1
80 TABLET, CHEWABLE ORAL ONCE
Refills: 0 | Status: COMPLETED | OUTPATIENT
Start: 2019-11-02 | End: 2019-11-02

## 2019-11-02 RX ORDER — IPRATROPIUM/ALBUTEROL SULFATE 18-103MCG
3 AEROSOL WITH ADAPTER (GRAM) INHALATION ONCE
Refills: 0 | Status: DISCONTINUED | OUTPATIENT
Start: 2019-11-02 | End: 2019-11-02

## 2019-11-02 RX ORDER — ACETAMINOPHEN 500 MG
650 TABLET ORAL ONCE
Refills: 0 | Status: COMPLETED | OUTPATIENT
Start: 2019-11-02 | End: 2019-11-02

## 2019-11-02 RX ADMIN — Medication 650 MILLIGRAM(S): at 12:00

## 2019-11-02 RX ADMIN — SODIUM CHLORIDE 100 MILLILITER(S): 9 INJECTION, SOLUTION INTRAVENOUS at 09:49

## 2019-11-02 RX ADMIN — Medication 4.5 MICROGRAM(S)/KG/MIN: at 09:49

## 2019-11-02 RX ADMIN — SIMETHICONE 80 MILLIGRAM(S): 80 TABLET, CHEWABLE ORAL at 20:49

## 2019-11-02 RX ADMIN — Medication 650 MILLIGRAM(S): at 12:30

## 2019-11-02 RX ADMIN — Medication 3 MILLILITER(S): at 11:36

## 2019-11-02 RX ADMIN — CHLORHEXIDINE GLUCONATE 1 APPLICATION(S): 213 SOLUTION TOPICAL at 13:00

## 2019-11-02 RX ADMIN — Medication 4.5 MICROGRAM(S)/KG/MIN: at 00:00

## 2019-11-02 RX ADMIN — CEFEPIME 100 MILLIGRAM(S): 1 INJECTION, POWDER, FOR SOLUTION INTRAMUSCULAR; INTRAVENOUS at 03:50

## 2019-11-02 NOTE — PROGRESS NOTE ADULT - ATTENDING COMMENTS
E.coli bacteremia due to infected renal stone transferred from [facility] for nephrostomy tube placement.  Continue abx, clinically improving  Titrating off steroids.  Wheezing, likely due to mild pulm edema likely due to fluid overload, will support with nebs, no diuretic due to HALEY, hold fluids, patient not hypoxic.

## 2019-11-02 NOTE — PROGRESS NOTE ADULT - PROBLEM SELECTOR PLAN 1
- two episodes dark stools at [facility]; will cont to monitor while here at [facility]  - Hgb remains stable; cont to trend  - rec Protonix 40mg IVP QD given questionable melena at [facility]  - avoid nsaids   - continue to monitor stool color and keep stools soft with miralax and suppositories prn  - no acute role for endoscopic evaluation   - will cont to follow  - cont care per micu/appreciated

## 2019-11-02 NOTE — PROGRESS NOTE ADULT - ASSESSMENT
Assessment:   74M PMHx CAD s/p stent, HTN, HLD, hypothyroidism, congenital atrophy of Rt kidney, previous kidney stones, obesity, with R hydronephrosis and urosepsis 2/2 R distal ureteral stones.   Plan:  Neuro:  -Patient AAOx3  -c/w dilaudid 0.5mg PRN for pain control   CV  -Patient w CAD s/p stent, HTN, HLD  -Shock etiology likely multifactorial (septic, hypovolemic shock)  -Septic Shock 2/2 urosepsis c/b obstructive uropathy  -hypovolemic shock 2/2 ?GI bleed (baseline Hgb 11, p/w Hgb 9.6) as patient w reported melanotic stool and FOBT positive.   -s/p 4L crystalloid, on Levophed, titrate to maintain MAP of 65, wean as tolerated   -s/p 3u PRBC, 1u platelets.   -monitor fluid status   -HTN- Hold antihypertensives in the setting of shock  -HLD- c/w statin therapy   -CAD- Hold asa in setting of thrombocytopenia  Respiratory  -currently on RA, supplemental oxygen via NC as needed  -CT scan demonstrates ANNA lung nodules; will need OP f/u.     GI  -patient reports melena x2 days w +FOBT.  -CT scan demonstrates colonic diverticulosis w/out diverticulitis  -s/p 3u PRBC, 1u platelets in [facility]   -trend H&H (CBC q12hr)  -observe BM   -GI consult in AM    Renal/  -s/p 4L fluids at [facility]  -Hx R congenital atrophic kidney, found to have R distal ureteral stones  -CT findings c/w cystitis  -IR planning percutaneous nephrostomy for drainage w Dr. Escobedo  -Urology consult in AM  Heme:   -Patient w hypovolemia, anemia in the setting of reported melanotic stool  -Patient s/p 3u PRBC, 1u platelets.   -Trend H&H (CBC q12hr)  -monitor coags  -active T&S  ID  -UA+  -f/u BCx, UCx at [facility]  -s/p 1g ceftriaxone, 2g cefepime at [facility]  -c/w cefepime pending sensitivities   Endo:   -Prediabetes  -f/u HbA1C    DVT prophylaxis:  -holding AC in setting of GI bleed  -SCDs 74M PMHx CAD s/p stent, HTN, HLD, hypothyroidism, congenital atrophy of Rt kidney, previous kidney stones, obesity, with R hydronephrosis and urosepsis 2/2 R distal ureteral stones s/p R percutaneous nephrostomy tube.     Neuro:  -Patient AAOx3  CV  -Patient w CAD s/p stent, HTN, HLD  -Shock etiology likely multifactorial (septic, hypovolemic shock)  -Septic Shock 2/2 urosepsis c/b obstructive uropathy  -hypovolemic shock 2/2 ?GI bleed (baseline Hgb 11, p/w Hgb 9.6) as patient w reported melanotic stool and FOBT positive.   -s/p 4L crystalloid, on Levophed, titrate to maintain MAP of 65, wean as tolerated   -s/p 3u PRBC, 1u platelets.   -monitor fluid status   -HTN- Hold antihypertensives in the setting of shock  -HLD- c/w statin therapy   -CAD- Hold asa in setting of thrombocytopenia  Respiratory  -currently on RA, supplemental oxygen via NC as needed  -CT scan demonstrates ANNA lung nodules; will need OP f/u.     GI  -patient reports melena x2 days w +FOBT.  -CT scan demonstrates colonic diverticulosis w/out diverticulitis  -s/p 3u PRBC, 1u platelets in [facility]   -trend H&H (CBC q12hr)  -observe BM   -per GI c/s -- no recs     Renal/  -s/p 4L fluids at [facility]  -Hx R congenital atrophic kidney, found to have R distal ureteral stones  -CT findings c/w cystitis  -s/p R percutaneous nephrostomy tube on 11/1; continue to monitor urine output  -c/w cefepime for UTI     Heme:   -Patient w hypovolemia, anemia in the setting of reported melanotic stool  -Patient s/p 3u PRBC, 1u platelets.   -Trend H&H (CBC q12hr)  -monitor coags  -active T&S  ID  -UA+  -f/u BCx, UCx at [facility]  -s/p 1g ceftriaxone, 2g cefepime at [facility]  -c/w cefepime (day 3) pending sensitivities   Endo:   -Prediabetes  -continue to monitor FS and ISS     DVT prophylaxis:  -holding AC in setting of GI bleed  -SCDs

## 2019-11-02 NOTE — PROGRESS NOTE ADULT - SUBJECTIVE AND OBJECTIVE BOX
Cat Mitchell, PGY 2  [phone]    OVERNIGHT EVENTS / SUBJECTIVE: Patient seen and examined at bedside.     OBJECTIVE:    VITAL SIGNS:  ICU Vital Signs Last 24 Hrs  T(C): 36.6 (02 Nov 2019 08:00), Max: 36.9 (01 Nov 2019 16:00)  T(F): 97.8 (02 Nov 2019 08:00), Max: 98.4 (01 Nov 2019 16:00)  HR: 84 (02 Nov 2019 11:37) (70 - 92)  BP: 134/78 (02 Nov 2019 09:00) (73/29 - 140/71)  BP(mean): 95 (02 Nov 2019 09:00) (41 - 99)  ABP: --  ABP(mean): --  RR: 25 (02 Nov 2019 09:00) (12 - 25)  SpO2: 98% (02 Nov 2019 09:00) (93% - 100%)        11-01 @ 07:01  -  11-02 @ 07:00  --------------------------------------------------------  IN: 1266.3 mL / OUT: 1740 mL / NET: -473.7 mL    11-02 @ 08:01  -  11-02 @ 13:16  --------------------------------------------------------  IN: 211 mL / OUT: 250 mL / NET: -39 mL      CAPILLARY BLOOD GLUCOSE      POCT Blood Glucose.: 114 mg/dL (01 Nov 2019 09:35)      PHYSICAL EXAM:    General: NAD  HEENT: NC/AT; PERRL, clear conjunctiva  Neck: supple  Respiratory: CTA b/l  Cardiovascular: +S1/S2; RRR  Abdomen: soft, NT/ND; +BS x4  Extremities: WWP, 2+ peripheral pulses b/l; no LE edema  Skin: normal color and turgor; no rash  Neurological:    MEDICATIONS:  MEDICATIONS  (STANDING):  acetaminophen   Tablet .. 650 milliGRAM(s) Oral once  cefepime   IVPB 1000 milliGRAM(s) IV Intermittent every 24 hours  chlorhexidine 4% Liquid 1 Application(s) Topical daily  norepinephrine Infusion 0.05 MICROgram(s)/kG/Min (4.5 mL/Hr) IV Continuous <Continuous>    MEDICATIONS  (PRN):  albuterol/ipratropium for Nebulization. 3 milliLiter(s) Nebulizer every 4 hours PRN Wheezing      ALLERGIES:  Allergies    No Known Allergies    Intolerances        LABS:                        11.5   12.39 )-----------( 80       ( 02 Nov 2019 03:30 )             36.0     Hemoglobin: 11.5 g/dL (11-02 @ 03:30)  Hemoglobin: 11.7 g/dL (11-01 @ 13:55)  Hemoglobin: 11.4 g/dL (11-01 @ 06:20)  Hemoglobin: 11.2 g/dL (10-31 @ 23:10)  Hemoglobin: 11.4 g/dL (10-31 @ 15:05)    CBC Full  -  ( 02 Nov 2019 03:30 )  WBC Count : 12.39 K/uL  RBC Count : 4.39 M/uL  Hemoglobin : 11.5 g/dL  Hematocrit : 36.0 %  Platelet Count - Automated : 80 K/uL  Mean Cell Volume : 82.0 fL  Mean Cell Hemoglobin : 26.2 pg  Mean Cell Hemoglobin Concentration : 31.9 %  Auto Neutrophil # : x  Auto Lymphocyte # : x  Auto Monocyte # : x  Auto Eosinophil # : x  Auto Basophil # : x  Auto Neutrophil % : x  Auto Lymphocyte % : x  Auto Monocyte % : x  Auto Eosinophil % : x  Auto Basophil % : x    11-02    138  |  107  |  44<H>  ----------------------------<  116<H>  3.7   |  16<L>  |  3.25<H>    Ca    8.7      02 Nov 2019 03:30  Phos  2.3     11-02  Mg     2.0     11-02    TPro  5.8<L>  /  Alb  2.4<L>  /  TBili  1.1  /  DBili  x   /  AST  24  /  ALT  18  /  AlkPhos  136<H>  11-02    Creatinine Trend: 3.25<--, 3.19<--, 3.27<--, 2.93<--, 2.87<--, 2.68<--  LIVER FUNCTIONS - ( 02 Nov 2019 03:30 )  Alb: 2.4 g/dL / Pro: 5.8 g/dL / ALK PHOS: 136 u/L / ALT: 18 u/L / AST: 24 u/L / GGT: x           PT/INR - ( 02 Nov 2019 03:30 )   PT: 14.4 SEC;   INR: 1.25          PTT - ( 02 Nov 2019 03:30 )  PTT:22.2 SEC    hs Troponin:              CSF:                      EKG:   MICROBIOLOGY:    Culture - Urine (collected 01 Nov 2019 20:10)  Source: URINE  Preliminary Report (02 Nov 2019 12:18):    EC^Escherichia coli    COLONY COUNT: > = 100,000 CFU/ML    Culture - Urine (collected 31 Oct 2019 13:59)  Source: .Urine  Preliminary Report (01 Nov 2019 14:47):    >100,000 CFU/ml Gram Negative Rods    Culture - Blood (collected 31 Oct 2019 10:58)  Source: .Blood  Preliminary Report (01 Nov 2019 11:01):    No growth to date.    Culture - Blood (collected 31 Oct 2019 10:58)  Source: .Blood  Gram Stain (01 Nov 2019 02:27):    Growth in anaerobic bottle: Gram Negative Rods    Growth in aerobic bottle: Gram Negative Rods  Final Report (02 Nov 2019 11:14):    Growth in aerobic and anaerobic bottles: Escherichia coli    "Due to technical problems, Proteus sp. will Not be reported as part of    the BCID panel until further notice"    ***Blood Panel PCR results on this specimen are available    approximately 3 hours after the Gram stain result.***    Gram stain, PCR, and/or culture results may not always    correspond due to difference in methodologies.    ************************************************************    This PCR assay was performed using [employer].    The following targets are tested for: Enterococcus,    vancomycin resistant enterococci, Listeria monocytogenes,    coagulase negative staphylococci, S. aureus,    methicillin resistant S. aureus, Streptococcus agalactiae    (Group B), S. pneumoniae, S.pyogenes (Group A),    Acinetobacter baumannii, Enterobacter cloacae, E. coli,    Klebsiella oxytoca, K. pneumoniae, Proteus sp.,    Serratia marcescens, Haemophilus influenzae,    Neisseria meningitidis, Pseudomonas aeruginosa, Candida    albicans, C. glabrata, C krusei, C parapsilosis,    C. tropicalis and the KPC resistance gene.  Organism: Blood Culture PCR  Escherichia coli (02 Nov 2019 11:14)  Organism: Escherichia coli (02 Nov 2019 11:14)  Organism: Blood Culture PCR (02 Nov 2019 11:14)      IMAGING:      Labs, imaging, EKG personally reviewed    RADIOLOGY & ADDITIONAL TESTS: Reviewed. Cat Mitchell, PGY 2  [phone]    OVERNIGHT EVENTS / SUBJECTIVE: Patient seen and examined at bedside. No acute events overnight.  Complaining of SOB currently on 2L NC.  Denies any fevers, chills, night sweats.  Denies any abdominal pain, nausea, vomiting.     OBJECTIVE:    VITAL SIGNS:  ICU Vital Signs Last 24 Hrs  T(C): 36.6 (02 Nov 2019 08:00), Max: 36.9 (01 Nov 2019 16:00)  T(F): 97.8 (02 Nov 2019 08:00), Max: 98.4 (01 Nov 2019 16:00)  HR: 84 (02 Nov 2019 11:37) (70 - 92)  BP: 134/78 (02 Nov 2019 09:00) (73/29 - 140/71)  BP(mean): 95 (02 Nov 2019 09:00) (41 - 99)  ABP: --  ABP(mean): --  RR: 25 (02 Nov 2019 09:00) (12 - 25)  SpO2: 98% (02 Nov 2019 09:00) (93% - 100%)        11-01 @ 07:01 - 11-02 @ 07:00  --------------------------------------------------------  IN: 1266.3 mL / OUT: 1740 mL / NET: -473.7 mL    11-02 @ 08:01 - 11-02 @ 13:16  --------------------------------------------------------  IN: 211 mL / OUT: 250 mL / NET: -39 mL      CAPILLARY BLOOD GLUCOSE      POCT Blood Glucose.: 114 mg/dL (01 Nov 2019 09:35)        PHYSICAL EXAM:    General: NAD  HEENT: NC/AT; PERRL, clear conjunctiva  Neck: supple  Respiratory: +expiratory wheezing   Cardiovascular: +S1/S2; RRR  Abdomen: soft, NT/ND; +BS x4  Extremities: WWP, 2+ peripheral pulses b/l; no LE edema  Skin: normal color and turgor; no rash  Neurological: nonfocal       MEDICATIONS:  MEDICATIONS  (STANDING):  acetaminophen   Tablet .. 650 milliGRAM(s) Oral once  cefepime   IVPB 1000 milliGRAM(s) IV Intermittent every 24 hours  chlorhexidine 4% Liquid 1 Application(s) Topical daily  norepinephrine Infusion 0.05 MICROgram(s)/kG/Min (4.5 mL/Hr) IV Continuous <Continuous>    MEDICATIONS  (PRN):  albuterol/ipratropium for Nebulization. 3 milliLiter(s) Nebulizer every 4 hours PRN Wheezing      ALLERGIES:  Allergies    No Known Allergies    Intolerances        LABS:                        11.5   12.39 )-----------( 80       ( 02 Nov 2019 03:30 )             36.0     Hemoglobin: 11.5 g/dL (11-02 @ 03:30)  Hemoglobin: 11.7 g/dL (11-01 @ 13:55)  Hemoglobin: 11.4 g/dL (11-01 @ 06:20)  Hemoglobin: 11.2 g/dL (10-31 @ 23:10)  Hemoglobin: 11.4 g/dL (10-31 @ 15:05)    CBC Full  -  ( 02 Nov 2019 03:30 )  WBC Count : 12.39 K/uL  RBC Count : 4.39 M/uL  Hemoglobin : 11.5 g/dL  Hematocrit : 36.0 %  Platelet Count - Automated : 80 K/uL  Mean Cell Volume : 82.0 fL  Mean Cell Hemoglobin : 26.2 pg  Mean Cell Hemoglobin Concentration : 31.9 %  Auto Neutrophil # : x  Auto Lymphocyte # : x  Auto Monocyte # : x  Auto Eosinophil # : x  Auto Basophil # : x  Auto Neutrophil % : x  Auto Lymphocyte % : x  Auto Monocyte % : x  Auto Eosinophil % : x  Auto Basophil % : x    11-02    138  |  107  |  44<H>  ----------------------------<  116<H>  3.7   |  16<L>  |  3.25<H>    Ca    8.7      02 Nov 2019 03:30  Phos  2.3     11-02  Mg     2.0     11-02    TPro  5.8<L>  /  Alb  2.4<L>  /  TBili  1.1  /  DBili  x   /  AST  24  /  ALT  18  /  AlkPhos  136<H>  11-02    Creatinine Trend: 3.25<--, 3.19<--, 3.27<--, 2.93<--, 2.87<--, 2.68<--  LIVER FUNCTIONS - ( 02 Nov 2019 03:30 )  Alb: 2.4 g/dL / Pro: 5.8 g/dL / ALK PHOS: 136 u/L / ALT: 18 u/L / AST: 24 u/L / GGT: x           PT/INR - ( 02 Nov 2019 03:30 )   PT: 14.4 SEC;   INR: 1.25          PTT - ( 02 Nov 2019 03:30 )  PTT:22.2 SEC    hs Troponin:              CSF:                      EKG:   MICROBIOLOGY:    Culture - Urine (collected 01 Nov 2019 20:10)  Source: URINE  Preliminary Report (02 Nov 2019 12:18):    EC^Escherichia coli    COLONY COUNT: > = 100,000 CFU/ML    Culture - Urine (collected 31 Oct 2019 13:59)  Source: .Urine  Preliminary Report (01 Nov 2019 14:47):    >100,000 CFU/ml Gram Negative Rods    Culture - Blood (collected 31 Oct 2019 10:58)  Source: .Blood  Preliminary Report (01 Nov 2019 11:01):    No growth to date.    Culture - Blood (collected 31 Oct 2019 10:58)  Source: .Blood  Gram Stain (01 Nov 2019 02:27):    Growth in anaerobic bottle: Gram Negative Rods    Growth in aerobic bottle: Gram Negative Rods  Final Report (02 Nov 2019 11:14):    Growth in aerobic and anaerobic bottles: Escherichia coli    "Due to technical problems, Proteus sp. will Not be reported as part of    the BCID panel until further notice"    ***Blood Panel PCR results on this specimen are available    approximately 3 hours after the Gram stain result.***    Gram stain, PCR, and/or culture results may not always    correspond due to difference in methodologies.    ************************************************************    This PCR assay was performed using [employer].    The following targets are tested for: Enterococcus,    vancomycin resistant enterococci, Listeria monocytogenes,    coagulase negative staphylococci, S. aureus,    methicillin resistant S. aureus, Streptococcus agalactiae    (Group B), S. pneumoniae, S.pyogenes (Group A),    Acinetobacter baumannii, Enterobacter cloacae, E. coli,    Klebsiella oxytoca, K. pneumoniae, Proteus sp.,    Serratia marcescens, Haemophilus influenzae,    Neisseria meningitidis, Pseudomonas aeruginosa, Candida    albicans, C. glabrata, C krusei, C parapsilosis,    C. tropicalis and the KPC resistance gene.  Organism: Blood Culture PCR  Escherichia coli (02 Nov 2019 11:14)  Organism: Escherichia coli (02 Nov 2019 11:14)  Organism: Blood Culture PCR (02 Nov 2019 11:14)      IMAGING:      Labs, imaging, EKG personally reviewed    RADIOLOGY & ADDITIONAL TESTS: Reviewed.

## 2019-11-02 NOTE — PROGRESS NOTE ADULT - SUBJECTIVE AND OBJECTIVE BOX
INTERVAL HPI/OVERNIGHT EVENTS:    no overnight gi events; no bloody stools; no melena   no abdominal pain   hgb stable    MEDICATIONS  (STANDING):  cefepime   IVPB 1000 milliGRAM(s) IV Intermittent every 24 hours  chlorhexidine 4% Liquid 1 Application(s) Topical daily  norepinephrine Infusion 0.05 MICROgram(s)/kG/Min (4.5 mL/Hr) IV Continuous <Continuous>    MEDICATIONS  (PRN):  albuterol/ipratropium for Nebulization. 3 milliLiter(s) Nebulizer every 4 hours PRN Wheezing      Allergies    No Known Allergies    Intolerances        Review of Systems:    General:  No wt loss, fevers, chills, night sweats, fatigue   Eyes:  Good vision, no reported pain  ENT:  No sore throat, pain, runny nose, dysphagia  CV:  No pain, palpitations, hypo/hypertension  Resp:  No dyspnea, cough, tachypnea, wheezing  GI:  No pain, No nausea, No vomiting, No diarrhea, No constipation, No weight loss, No fever, No pruritis, No rectal bleeding, No melena, No dysphagia  :  No pain, bleeding, incontinence, nocturia  Muscle:  No pain, weakness  Neuro:  No weakness, tingling, memory problems  Psych:  No fatigue, insomnia, mood problems, depression  Endocrine:  No polyuria, polydypsia, cold/heat intolerance  Heme:  No petechiae, ecchymosis, easy bruisability  Skin:  No rash, tattoos, scars, edema      Vital Signs Last 24 Hrs  T(C): 36.6 (02 Nov 2019 08:00), Max: 37.3 (01 Nov 2019 12:30)  T(F): 97.8 (02 Nov 2019 08:00), Max: 99.2 (01 Nov 2019 12:30)  HR: 84 (02 Nov 2019 11:37) (70 - 92)  BP: 134/78 (02 Nov 2019 09:00) (73/29 - 140/71)  BP(mean): 95 (02 Nov 2019 09:00) (41 - 99)  RR: 25 (02 Nov 2019 09:00) (12 - 25)  SpO2: 98% (02 Nov 2019 09:00) (93% - 100%)    PHYSICAL EXAM:    Constitutional: NAD  HEENT: EOMI, throat clear  Neck: No LAD, supple  Respiratory: CTA and P  Cardiovascular: S1 and S2, RRR, no M  Gastrointestinal: BS+, soft, NT/ND, neg HSM,  Extremities: No peripheral edema, neg clubbing, cyanosis  Vascular: 2+ peripheral pulses  Neurological: A/O x 3, no focal deficits  Psychiatric: Normal mood, normal affect  Skin: No rashes      LABS:                        11.5   12.39 )-----------( 80       ( 02 Nov 2019 03:30 )             36.0     11-02    138  |  107  |  44<H>  ----------------------------<  116<H>  3.7   |  16<L>  |  3.25<H>    Ca    8.7      02 Nov 2019 03:30  Phos  2.3     11-02  Mg     2.0     11-02    TPro  5.8<L>  /  Alb  2.4<L>  /  TBili  1.1  /  DBili  x   /  AST  24  /  ALT  18  /  AlkPhos  136<H>  11-02    PT/INR - ( 02 Nov 2019 03:30 )   PT: 14.4 SEC;   INR: 1.25          PTT - ( 02 Nov 2019 03:30 )  PTT:22.2 SEC      RADIOLOGY & ADDITIONAL TESTS:

## 2019-11-03 LAB
-  AMIKACIN: SIGNIFICANT CHANGE UP
-  AMPICILLIN/SULBACTAM: SIGNIFICANT CHANGE UP
-  AMPICILLIN: SIGNIFICANT CHANGE UP
-  AZTREONAM: SIGNIFICANT CHANGE UP
-  CEFAZOLIN: SIGNIFICANT CHANGE UP
-  CEFEPIME: SIGNIFICANT CHANGE UP
-  CEFOXITIN: SIGNIFICANT CHANGE UP
-  CEFTAZIDIME: SIGNIFICANT CHANGE UP
-  CEFTRIAXONE: SIGNIFICANT CHANGE UP
-  ERTAPENEM: SIGNIFICANT CHANGE UP
-  GENTAMICIN: SIGNIFICANT CHANGE UP
-  IMIPENEM: SIGNIFICANT CHANGE UP
-  LEVOFLOXACIN: SIGNIFICANT CHANGE UP
-  MEROPENEM: SIGNIFICANT CHANGE UP
-  NITROFURANTOIN: SIGNIFICANT CHANGE UP
-  PIPERACILLIN/TAZOBACTAM: SIGNIFICANT CHANGE UP
-  TIGECYCLINE: SIGNIFICANT CHANGE UP
-  TOBRAMYCIN: SIGNIFICANT CHANGE UP
-  TRIMETHOPRIM/SULFAMETHOXAZOLE: SIGNIFICANT CHANGE UP
ALBUMIN SERPL ELPH-MCNC: 2.5 G/DL — LOW (ref 3.3–5)
ALP SERPL-CCNC: 161 U/L — HIGH (ref 40–120)
ALT FLD-CCNC: 17 U/L — SIGNIFICANT CHANGE UP (ref 4–41)
ANION GAP SERPL CALC-SCNC: 13 MMO/L — SIGNIFICANT CHANGE UP (ref 7–14)
APTT BLD: 27.4 SEC — LOW (ref 27.5–36.3)
AST SERPL-CCNC: 18 U/L — SIGNIFICANT CHANGE UP (ref 4–40)
BACTERIA UR CULT: SIGNIFICANT CHANGE UP
BASOPHILS # BLD AUTO: 0.06 K/UL — SIGNIFICANT CHANGE UP (ref 0–0.2)
BASOPHILS NFR BLD AUTO: 0.4 % — SIGNIFICANT CHANGE UP (ref 0–2)
BILIRUB SERPL-MCNC: 1.6 MG/DL — HIGH (ref 0.2–1.2)
BUN SERPL-MCNC: 43 MG/DL — HIGH (ref 7–23)
CALCIUM SERPL-MCNC: 9.2 MG/DL — SIGNIFICANT CHANGE UP (ref 8.4–10.5)
CHLORIDE SERPL-SCNC: 107 MMOL/L — SIGNIFICANT CHANGE UP (ref 98–107)
CO2 SERPL-SCNC: 19 MMOL/L — LOW (ref 22–31)
CREAT SERPL-MCNC: 3.52 MG/DL — HIGH (ref 0.5–1.3)
EOSINOPHIL # BLD AUTO: 0.3 K/UL — SIGNIFICANT CHANGE UP (ref 0–0.5)
EOSINOPHIL NFR BLD AUTO: 2.1 % — SIGNIFICANT CHANGE UP (ref 0–6)
GLUCOSE SERPL-MCNC: 109 MG/DL — HIGH (ref 70–99)
GRAM STN FLD: SIGNIFICANT CHANGE UP
HCT VFR BLD CALC: 34.7 % — LOW (ref 39–50)
HGB BLD-MCNC: 11.3 G/DL — LOW (ref 13–17)
IMM GRANULOCYTES NFR BLD AUTO: 1.6 % — HIGH (ref 0–1.5)
INR BLD: 1.2 — HIGH (ref 0.88–1.17)
LYMPHOCYTES # BLD AUTO: 0.61 K/UL — LOW (ref 1–3.3)
LYMPHOCYTES # BLD AUTO: 4.2 % — LOW (ref 13–44)
MAGNESIUM SERPL-MCNC: 2.1 MG/DL — SIGNIFICANT CHANGE UP (ref 1.6–2.6)
MANUAL SMEAR VERIFICATION: SIGNIFICANT CHANGE UP
MCHC RBC-ENTMCNC: 26.2 PG — LOW (ref 27–34)
MCHC RBC-ENTMCNC: 32.6 % — SIGNIFICANT CHANGE UP (ref 32–36)
MCV RBC AUTO: 80.3 FL — SIGNIFICANT CHANGE UP (ref 80–100)
METHOD TYPE: SIGNIFICANT CHANGE UP
METHOD TYPE: SIGNIFICANT CHANGE UP
MONOCYTES # BLD AUTO: 0.83 K/UL — SIGNIFICANT CHANGE UP (ref 0–0.9)
MONOCYTES NFR BLD AUTO: 5.7 % — SIGNIFICANT CHANGE UP (ref 2–14)
NEUTROPHILS # BLD AUTO: 12.59 K/UL — HIGH (ref 1.8–7.4)
NEUTROPHILS NFR BLD AUTO: 86 % — HIGH (ref 43–77)
NRBC # FLD: 0.03 K/UL — SIGNIFICANT CHANGE UP (ref 0–0)
ORGANISM # SPEC MICROSCOPIC CNT: SIGNIFICANT CHANGE UP
PHOSPHATE SERPL-MCNC: 1.9 MG/DL — LOW (ref 2.5–4.5)
PLATELET # BLD AUTO: 68 K/UL — LOW (ref 150–400)
PMV BLD: 11.7 FL — SIGNIFICANT CHANGE UP (ref 7–13)
POTASSIUM SERPL-MCNC: 3.5 MMOL/L — SIGNIFICANT CHANGE UP (ref 3.5–5.3)
POTASSIUM SERPL-SCNC: 3.5 MMOL/L — SIGNIFICANT CHANGE UP (ref 3.5–5.3)
PROT SERPL-MCNC: 5.7 G/DL — LOW (ref 6–8.3)
PROTHROM AB SERPL-ACNC: 13.4 SEC — HIGH (ref 9.8–13.1)
RBC # BLD: 4.32 M/UL — SIGNIFICANT CHANGE UP (ref 4.2–5.8)
RBC # FLD: 15.9 % — HIGH (ref 10.3–14.5)
SODIUM SERPL-SCNC: 139 MMOL/L — SIGNIFICANT CHANGE UP (ref 135–145)
SPECIMEN SOURCE: SIGNIFICANT CHANGE UP
SPECIMEN SOURCE: SIGNIFICANT CHANGE UP
WBC # BLD: 14.63 K/UL — HIGH (ref 3.8–10.5)
WBC # FLD AUTO: 14.63 K/UL — HIGH (ref 3.8–10.5)

## 2019-11-03 PROCEDURE — 99233 SBSQ HOSP IP/OBS HIGH 50: CPT | Mod: GC

## 2019-11-03 RX ORDER — PANTOPRAZOLE SODIUM 20 MG/1
40 TABLET, DELAYED RELEASE ORAL DAILY
Refills: 0 | Status: DISCONTINUED | OUTPATIENT
Start: 2019-11-03 | End: 2019-11-04

## 2019-11-03 RX ORDER — ACETAMINOPHEN 500 MG
650 TABLET ORAL ONCE
Refills: 0 | Status: COMPLETED | OUTPATIENT
Start: 2019-11-03 | End: 2019-11-03

## 2019-11-03 RX ORDER — SIMETHICONE 80 MG/1
80 TABLET, CHEWABLE ORAL
Refills: 0 | Status: DISCONTINUED | OUTPATIENT
Start: 2019-11-03 | End: 2019-11-07

## 2019-11-03 RX ORDER — LANOLIN ALCOHOL/MO/W.PET/CERES
3 CREAM (GRAM) TOPICAL AT BEDTIME
Refills: 0 | Status: DISCONTINUED | OUTPATIENT
Start: 2019-11-03 | End: 2019-11-07

## 2019-11-03 RX ORDER — SODIUM,POTASSIUM PHOSPHATES 278-250MG
1 POWDER IN PACKET (EA) ORAL
Refills: 0 | Status: COMPLETED | OUTPATIENT
Start: 2019-11-03 | End: 2019-11-03

## 2019-11-03 RX ADMIN — Medication 260 MILLIGRAM(S): at 03:00

## 2019-11-03 RX ADMIN — Medication 3 MILLIGRAM(S): at 00:56

## 2019-11-03 RX ADMIN — Medication 1 TABLET(S): at 09:30

## 2019-11-03 RX ADMIN — SIMETHICONE 80 MILLIGRAM(S): 80 TABLET, CHEWABLE ORAL at 20:52

## 2019-11-03 RX ADMIN — CHLORHEXIDINE GLUCONATE 1 APPLICATION(S): 213 SOLUTION TOPICAL at 13:54

## 2019-11-03 RX ADMIN — Medication 650 MILLIGRAM(S): at 03:30

## 2019-11-03 RX ADMIN — Medication 650 MILLIGRAM(S): at 10:30

## 2019-11-03 RX ADMIN — Medication 1 TABLET(S): at 13:55

## 2019-11-03 RX ADMIN — CEFEPIME 100 MILLIGRAM(S): 1 INJECTION, POWDER, FOR SOLUTION INTRAMUSCULAR; INTRAVENOUS at 02:31

## 2019-11-03 RX ADMIN — Medication 650 MILLIGRAM(S): at 09:30

## 2019-11-03 RX ADMIN — PANTOPRAZOLE SODIUM 40 MILLIGRAM(S): 20 TABLET, DELAYED RELEASE ORAL at 13:55

## 2019-11-03 NOTE — PROGRESS NOTE ADULT - PROBLEM SELECTOR PLAN 1
- two episodes dark stools at [facility]; cont to monitor while here at [facility]  - Hgb remains stable; would cont to trend daily  - rec Protonix 40mg IVP for gi ppx given questionable melena at [facility]  - avoid nsaids   - simethicone q6h for gas discomfort   - continue to monitor stool color and keep stools soft with miralax and suppositories prn; pt occasionally requires enema's   - no acute role for endoscopic evaluation   - will cont to follow  - cont care per icu/appreciated

## 2019-11-03 NOTE — PROGRESS NOTE ADULT - ASSESSMENT
74M PMHx CAD s/p stent, HTN, HLD, hypothyroidism, congenital atrophy of Rt kidney, previous kidney stones, obesity, with R hydronephrosis and urosepsis 2/2 R distal ureteral stones s/p R percutaneous nephrostomy tube.     Neuro:  -Patient AAOx3    CV  -Patient w CAD s/p stent, HTN, HLD  -Shock etiology likely multifactorial (septic, hypovolemic shock)  -Septic Shock 2/2 urosepsis c/b obstructive uropathy  -hypovolemic shock 2/2 ?GI bleed (baseline Hgb 11, p/w Hgb 9.6) as patient w reported melanotic stool and FOBT positive.   -s/p 4L crystalloid, on Levophed, titrate to maintain MAP of 65, wean as tolerated   -s/p 3u PRBC, 1u platelets.   -monitor fluid status   -HTN- Hold antihypertensives in the setting of shock  -HLD- c/w statin therapy   -CAD- Hold asa in setting of thrombocytopenia  Respiratory  -currently on RA, supplemental oxygen via NC as needed  -CT scan demonstrates ANNA lung nodules; will need OP f/u.     GI  -patient reports melena x2 days w +FOBT.  -CT scan demonstrates colonic diverticulosis w/out diverticulitis  -s/p 3u PRBC, 1u platelets in [facility]   -trend H&H (CBC q12hr)  -observe BM   -per GI c/s -- no recs     Renal/  -s/p 4L fluids at [facility]  -Hx R congenital atrophic kidney, found to have R distal ureteral stones  -CT findings c/w cystitis  -s/p R percutaneous nephrostomy tube on 11/1; continue to monitor urine output  -c/w cefepime for UTI     Heme:   -Patient w hypovolemia, anemia in the setting of reported melanotic stool  -Patient s/p 3u PRBC, 1u platelets.   -Trend H&H (CBC q12hr)  -monitor coags  -active T&S  ID  -UA+  -f/u BCx, UCx at [facility]  -s/p 1g ceftriaxone, 2g cefepime at [facility]  -c/w cefepime (day 4) pending sensitivities     Endo:   -Prediabetes  -continue to monitor FS and ISS     DVT prophylaxis:  -holding AC in setting of GI bleed  -SCDs 74M PMHx CAD s/p stent, HTN, HLD, hypothyroidism, congenital atrophy of Rt kidney, previous kidney stones, obesity, with R hydronephrosis and urosepsis 2/2 R distal ureteral stones s/p R percutaneous nephrostomy tube.     Neuro:  -Patient AAOx3    CV  -Patient w CAD s/p stent, HTN, HLD  -Shock etiology likely multifactorial (septic, hypovolemic shock)  -Septic Shock 2/2 urosepsis c/b obstructive uropathy  -hypovolemic shock 2/2 ?GI bleed (baseline Hgb 11, p/w Hgb 9.6) as patient w reported melanotic stool and FOBT positive.   -s/p 4L crystalloid  -s/p 3u PRBC, 1u platelets.   - Now off levophed since 11/2.   -monitor fluid status   -HTN- Hold antihypertensives in the setting of shock  -HLD- c/w statin therapy   -CAD- Hold asa in setting of thrombocytopenia    Respiratory  -currently on RA, supplemental oxygen via NC as needed  -CT scan demonstrates ANNA lung nodules; will need OP f/u.     GI  -patient reports melena x2 days w +FOBT.  -CT scan demonstrates colonic diverticulosis w/out diverticulitis  -s/p 3u PRBC, 1u platelets in [facility]   -trend H&H (CBC q12hr)  -observe BM   -per GI c/s -- no recs     Renal/  -s/p 4L fluids at [facility]  -Hx R congenital atrophic kidney, found to have R distal ureteral stones  -CT findings c/w cystitis  -s/p R percutaneous nephrostomy tube on 11/1; continue to monitor urine output  -c/w cefepime for UTI     Heme:   -Patient w hypovolemia, anemia in the setting of reported melanotic stool  -Patient s/p 3u PRBC, 1u platelets.   -Trend H&H (CBC q12hr)  -monitor coags  -active T&S    ID  -UA+  -BCx 11/1 coag neg staph , UCx positive for E.Coli    -s/p 1g ceftriaxone, 2g cefepime at [facility]  -c/w cefepime (day 4) pending sensitivities   - Repeat cultures sent 11/3.     Endo:   -Prediabetes  -continue to monitor FS and ISS   - TSH 5. Clarify if on levothyroxine.     DVT prophylaxis:  -holding AC in setting of GI bleed  -SCDs     Needs med recs 74M PMHx CAD s/p stent, HTN, HLD, hypothyroidism, congenital atrophy of Rt kidney, previous kidney stones, obesity, with R hydronephrosis and urosepsis 2/2 R distal ureteral stones s/p R percutaneous nephrostomy tube.     Neuro:  -Patient AAOx3    CV  -Patient w CAD s/p stent, HTN, HLD  -Shock etiology likely multifactorial (septic, hypovolemic shock)  -Septic Shock 2/2 urosepsis c/b obstructive uropathy  -hypovolemic shock 2/2 ?GI bleed (baseline Hgb 11, p/w Hgb 9.6) as patient w reported melanotic stool and FOBT positive.   -s/p 4L crystalloid  -s/p 3u PRBC, 1u platelets.   - Now off levophed since 11/2.   -HTN- Hold antihypertensives in the setting of shock  -HLD- c/w statin therapy   -CAD- Hold asa in setting of thrombocytopenia  - Restart home meds as tolerated.     Respiratory  -currently on RA, supplemental oxygen via [place] as needed  -CT scan demonstrates ANNA lung nodules; will need OP f/u.     GI  -patient reports melena x2 days w +FOBT.  -CT scan demonstrates colonic diverticulosis w/out diverticulitis  -s/p 3u PRBC, 1u platelets in [facility]   -trend H&H (CBC q12hr)  -observe BM   -per GI c/s -- no recs     Renal/  -s/p 4L fluids at [facility]  -Hx R congenital atrophic kidney, found to have R distal ureteral stones  -CT findings c/w cystitis  -s/p R percutaneous nephrostomy tube on 11/1; continue to monitor urine output  -c/w cefepime for UTI     Heme:   -Patient w hypovolemia, anemia in the setting of reported melanotic stool  -Patient s/p 3u PRBC, 1u platelets.   -Trend H&H (CBC q12hr)  -monitor coags  -active T&S    ID  -UA+  -BCx 11/1 coag neg staph , UCx positive for E.Coli    -s/p 1g ceftriaxone, 2g cefepime at [facility]  -c/w cefepime (day 4) pending sensitivities   - Repeat cultures sent 11/3.     Endo:   -Prediabetes  -continue to monitor FS and ISS   - TSH 5. Clarify if on levothyroxine. Needs med recs    DVT prophylaxis:  -holding AC in setting of GI bleed  -SCDs

## 2019-11-03 NOTE — PROGRESS NOTE ADULT - ATTENDING COMMENTS
E.coli bacteremia due to infected renal stone transferred from [facility] for nephrostomy tube placement.  Continue abx, clinically improving on cefepime.  Titrating off steroids.  History of GIB in [facility], not observed in [facility], GI is following will need work up.  Thrombocytopenia and anemia stable, but of unclear etiology will need further work up.  Stable for transfer to floor

## 2019-11-03 NOTE — CHART NOTE - NSCHARTNOTEFT_GEN_A_CORE
MICU Transfer Note  ---------------------------    Transfer from: MICU  Transfer to:  ( X ) Medicine    (  ) Telemetry    (  ) RCU    (  ) Palliative    (  ) Stroke Unit    (  ) _______________  Accepting Physican:      MICU COURSE    Patient is 74M PMHx HTN, HLD, CAD s/p stent, prediabetes, congenital atrophic R kidney, previous kidney stones, present as transfer from [facility] w R hydronephrosis, urosepsis 2/2 R distal ureteral stones. Patient underwent IR guided percutaneous neprhostomy tube placement on Friday 11/1 with IR. At [facility] patient in ICU, started on levo, received approx 5L crystalloid, 3u PRBC for ?lower GI bleed (patient reported melanotic stool, +FOBT) however initial CBC w marked hemodilution so Hgb at time of admission unclear. BCx+ for E.Coli bacteremia. No reported episodes of large melena or BRBPR during this hospital stay. GI and urology consulted. Urology states no role for acute intervention in ureteral stones during this hospital stay; patient can f/u OP w Dr. Meza. Patient now off levo, continuing w IV abx.         OBJECTIVE --  Vital Signs Last 24 Hrs  T(C): 37.1 (03 Nov 2019 04:00), Max: 37.3 (02 Nov 2019 18:35)  T(F): 98.7 (03 Nov 2019 04:00), Max: 99.1 (02 Nov 2019 18:35)  HR: 73 (03 Nov 2019 07:00) (66 - 92)  BP: 135/73 (03 Nov 2019 07:00) (91/71 - 137/76)  BP(mean): 88 (03 Nov 2019 07:00) (55 - 100)  RR: 19 (03 Nov 2019 07:00) (16 - 25)  SpO2: 97% (03 Nov 2019 07:00) (92% - 99%)    I&O's Summary    02 Nov 2019 08:01  -  03 Nov 2019 07:00  --------------------------------------------------------  IN: 1282 mL / OUT: 2745 mL / NET: -1463 mL        MEDICATIONS  (STANDING):  cefepime   IVPB 1000 milliGRAM(s) IV Intermittent every 24 hours  chlorhexidine 4% Liquid 1 Application(s) Topical daily  norepinephrine Infusion 0.05 MICROgram(s)/kG/Min (4.5 mL/Hr) IV Continuous <Continuous>    MEDICATIONS  (PRN):  albuterol/ipratropium for Nebulization. 3 milliLiter(s) Nebulizer every 4 hours PRN Wheezing  melatonin 3 milliGRAM(s) Oral at bedtime PRN Insomnia        LABS                                            11.3                  Neurophils% (auto):   86.0   (11-03 @ 03:30):    14.63)-----------(68           Lymphocytes% (auto):  4.2                                           34.7                   Eosinphils% (auto):   2.1      Manual%: Neutrophils x    ; Lymphocytes x    ; Eosinophils x    ; Bands%: x    ; Blasts x                                    139    |  107    |  43                  Calcium: 9.2   / iCa: x      (11-03 @ 03:30)    ----------------------------<  109       Magnesium: 2.1                              3.5     |  19     |  3.52             Phosphorous: 1.9      TPro  5.7    /  Alb  2.5    /  TBili  1.6    /  DBili  x      /  AST  18     /  ALT  17     /  AlkPhos  161    03 Nov 2019 03:30    ( 11-03 @ 03:30 )   PT: 13.4 SEC;   INR: 1.20   aPTT: 27.4 SEC          ASSESSMENT & PLAN:     For Follow-Up:  -f/u repeat BCx, Ucx, IR nephrostomy Cx and adjust abx accordingly.   -trend H&H, heightened surveillance of stool for melena   -f/u GI recs for melena  -uro states no need for intervention for ureteral stone until infection clears, if stay prolonged may  or other urologic concerns arise consider uro consult.  -will restart AC today, monitor nephrostomy site for bleeding MICU Transfer Note  ---------------------------    Transfer from: MICU  Transfer to:  ( X ) Medicine    (  ) Telemetry    (  ) RCU    (  ) Palliative    (  ) Stroke Unit    (  ) _______________  Accepting Physican: Dr. Catherine      MICU COURSE    Patient is 74M PMHx HTN, HLD, CAD s/p stent, prediabetes, congenital atrophic R kidney, previous kidney stones, present as transfer from [facility] w R hydronephrosis, urosepsis 2/2 R distal ureteral stones. Patient underwent IR guided percutaneous neprhostomy tube placement on Friday 11/1 with IR. At [facility] patient in ICU, started on levo, received approx 5L crystalloid, 3u PRBC for ?lower GI bleed (patient reported melanotic stool, +FOBT) however initial CBC w marked hemodilution so Hgb at time of admission unclear. BCx+ for E.Coli bacteremia. No reported episodes of large melena or BRBPR during this hospital stay. GI and urology consulted. Urology states no role for acute intervention in ureteral stones during this hospital stay; patient can f/u OP w Dr. Meza. Patient now off levo, continuing w IV abx.         OBJECTIVE --  Vital Signs Last 24 Hrs  T(C): 37.1 (03 Nov 2019 04:00), Max: 37.3 (02 Nov 2019 18:35)  T(F): 98.7 (03 Nov 2019 04:00), Max: 99.1 (02 Nov 2019 18:35)  HR: 73 (03 Nov 2019 07:00) (66 - 92)  BP: 135/73 (03 Nov 2019 07:00) (91/71 - 137/76)  BP(mean): 88 (03 Nov 2019 07:00) (55 - 100)  RR: 19 (03 Nov 2019 07:00) (16 - 25)  SpO2: 97% (03 Nov 2019 07:00) (92% - 99%)    I&O's Summary    02 Nov 2019 08:01  -  03 Nov 2019 07:00  --------------------------------------------------------  IN: 1282 mL / OUT: 2745 mL / NET: -1463 mL        MEDICATIONS  (STANDING):  cefepime   IVPB 1000 milliGRAM(s) IV Intermittent every 24 hours  chlorhexidine 4% Liquid 1 Application(s) Topical daily  norepinephrine Infusion 0.05 MICROgram(s)/kG/Min (4.5 mL/Hr) IV Continuous <Continuous>    MEDICATIONS  (PRN):  albuterol/ipratropium for Nebulization. 3 milliLiter(s) Nebulizer every 4 hours PRN Wheezing  melatonin 3 milliGRAM(s) Oral at bedtime PRN Insomnia        LABS                                            11.3                  Neurophils% (auto):   86.0   (11-03 @ 03:30):    14.63)-----------(68           Lymphocytes% (auto):  4.2                                           34.7                   Eosinphils% (auto):   2.1      Manual%: Neutrophils x    ; Lymphocytes x    ; Eosinophils x    ; Bands%: x    ; Blasts x                                    139    |  107    |  43                  Calcium: 9.2   / iCa: x      (11-03 @ 03:30)    ----------------------------<  109       Magnesium: 2.1                              3.5     |  19     |  3.52             Phosphorous: 1.9      TPro  5.7    /  Alb  2.5    /  TBili  1.6    /  DBili  x      /  AST  18     /  ALT  17     /  AlkPhos  161    03 Nov 2019 03:30    ( 11-03 @ 03:30 )   PT: 13.4 SEC;   INR: 1.20   aPTT: 27.4 SEC          ASSESSMENT & PLAN:     For Follow-Up:  -f/u repeat BCx, Ucx, IR nephrostomy Cx and adjust abx accordingly.   -trend H&H, heightened surveillance of stool for melena   -f/u GI recs for melena  -uro states no need for intervention for ureteral stone until infection clears, if stay prolonged may  or other urologic concerns arise consider uro consult.  -will restart AC today, monitor nephrostomy site for bleeding MICU Transfer Note  ---------------------------    Transfer from: MICU  Transfer to:  ( X ) Medicine  7n 721  (  ) Telemetry    (  ) RCU    (  ) Palliative    (  ) Stroke Unit    (  ) _______________  Accepting Physican: Dr. Catherine      MICU COURSE    Patient is 74M PMHx HTN, HLD, CAD s/p stent, prediabetes, congenital atrophic R kidney, previous kidney stones, present as transfer from [facility] w R hydronephrosis, urosepsis 2/2 R distal ureteral stones. Patient underwent IR guided percutaneous neprhostomy tube placement on Friday 11/1 with IR. At [facility] patient in ICU, started on levo, received approx 5L crystalloid, 3u PRBC for ?lower GI bleed (patient reported melanotic stool, +FOBT) however initial CBC w marked hemodilution so Hgb at time of admission unclear. BCx+ for E.Coli bacteremia. No reported episodes of large melena or BRBPR during this hospital stay. GI and urology consulted. Urology states no role for acute intervention in ureteral stones during this hospital stay; patient can f/u OP w Dr. Meza. Patient now off levo, continuing w IV abx.         OBJECTIVE --  Vital Signs Last 24 Hrs  T(C): 37.1 (03 Nov 2019 04:00), Max: 37.3 (02 Nov 2019 18:35)  T(F): 98.7 (03 Nov 2019 04:00), Max: 99.1 (02 Nov 2019 18:35)  HR: 73 (03 Nov 2019 07:00) (66 - 92)  BP: 135/73 (03 Nov 2019 07:00) (91/71 - 137/76)  BP(mean): 88 (03 Nov 2019 07:00) (55 - 100)  RR: 19 (03 Nov 2019 07:00) (16 - 25)  SpO2: 97% (03 Nov 2019 07:00) (92% - 99%)    I&O's Summary    02 Nov 2019 08:01  -  03 Nov 2019 07:00  --------------------------------------------------------  IN: 1282 mL / OUT: 2745 mL / NET: -1463 mL        MEDICATIONS  (STANDING):  cefepime   IVPB 1000 milliGRAM(s) IV Intermittent every 24 hours  chlorhexidine 4% Liquid 1 Application(s) Topical daily  norepinephrine Infusion 0.05 MICROgram(s)/kG/Min (4.5 mL/Hr) IV Continuous <Continuous>    MEDICATIONS  (PRN):  albuterol/ipratropium for Nebulization. 3 milliLiter(s) Nebulizer every 4 hours PRN Wheezing  melatonin 3 milliGRAM(s) Oral at bedtime PRN Insomnia        LABS                                            11.3                  Neurophils% (auto):   86.0   (11-03 @ 03:30):    14.63)-----------(68           Lymphocytes% (auto):  4.2                                           34.7                   Eosinphils% (auto):   2.1      Manual%: Neutrophils x    ; Lymphocytes x    ; Eosinophils x    ; Bands%: x    ; Blasts x                                    139    |  107    |  43                  Calcium: 9.2   / iCa: x      (11-03 @ 03:30)    ----------------------------<  109       Magnesium: 2.1                              3.5     |  19     |  3.52             Phosphorous: 1.9      TPro  5.7    /  Alb  2.5    /  TBili  1.6    /  DBili  x      /  AST  18     /  ALT  17     /  AlkPhos  161    03 Nov 2019 03:30    ( 11-03 @ 03:30 )   PT: 13.4 SEC;   INR: 1.20   aPTT: 27.4 SEC          ASSESSMENT & PLAN:     For Follow-Up:  -f/u repeat BCx, Ucx, IR nephrostomy Cx and adjust abx accordingly.   -trend H&H, heightened surveillance of stool for melena   -f/u GI recs for melena  -uro states no need for intervention for ureteral stone until infection clears, if stay prolonged may  or other urologic concerns arise consider uro consult.  -will restart AC today, monitor nephrostomy site for bleeding MICU Transfer Note  ---------------------------    Transfer from: MICU  Transfer to:  ( X ) Medicine  7n 721  (  ) Telemetry    (  ) RCU    (  ) Palliative    (  ) Stroke Unit    (  ) _______________  Accepting Physican: Dr. Catherine      MICU COURSE    Patient is 74M PMHx HTN, HLD, CAD s/p stent, prediabetes, congenital atrophic R kidney, previous kidney stones, present as transfer from [facility] w R hydronephrosis, urosepsis 2/2 R distal ureteral stones. Patient underwent IR guided percutaneous neprhostomy tube placement on Friday 11/1 with IR. At [facility] patient in ICU, started on levo, received approx 5L crystalloid, 3u PRBC for ?lower GI bleed (patient reported melanotic stool, +FOBT) however initial CBC w marked hemodilution so Hgb at time of admission unclear. BCx+ for E.Coli bacteremia. No reported episodes of large melena or BRBPR during this hospital stay. GI and urology consulted. Urology states no role for acute intervention in ureteral stones during this hospital stay; patient can f/u OP w Dr. Meza. Patient now off levo, continuing w IV abx.         OBJECTIVE --  Vital Signs Last 24 Hrs  T(C): 37.1 (03 Nov 2019 04:00), Max: 37.3 (02 Nov 2019 18:35)  T(F): 98.7 (03 Nov 2019 04:00), Max: 99.1 (02 Nov 2019 18:35)  HR: 73 (03 Nov 2019 07:00) (66 - 92)  BP: 135/73 (03 Nov 2019 07:00) (91/71 - 137/76)  BP(mean): 88 (03 Nov 2019 07:00) (55 - 100)  RR: 19 (03 Nov 2019 07:00) (16 - 25)  SpO2: 97% (03 Nov 2019 07:00) (92% - 99%)    I&O's Summary    02 Nov 2019 08:01  -  03 Nov 2019 07:00  --------------------------------------------------------  IN: 1282 mL / OUT: 2745 mL / NET: -1463 mL        MEDICATIONS  (STANDING):  cefepime   IVPB 1000 milliGRAM(s) IV Intermittent every 24 hours  chlorhexidine 4% Liquid 1 Application(s) Topical daily  norepinephrine Infusion 0.05 MICROgram(s)/kG/Min (4.5 mL/Hr) IV Continuous <Continuous>    MEDICATIONS  (PRN):  albuterol/ipratropium for Nebulization. 3 milliLiter(s) Nebulizer every 4 hours PRN Wheezing  melatonin 3 milliGRAM(s) Oral at bedtime PRN Insomnia        LABS                                            11.3                  Neurophils% (auto):   86.0   (11-03 @ 03:30):    14.63)-----------(68           Lymphocytes% (auto):  4.2                                           34.7                   Eosinphils% (auto):   2.1      Manual%: Neutrophils x    ; Lymphocytes x    ; Eosinophils x    ; Bands%: x    ; Blasts x                                    139    |  107    |  43                  Calcium: 9.2   / iCa: x      (11-03 @ 03:30)    ----------------------------<  109       Magnesium: 2.1                              3.5     |  19     |  3.52             Phosphorous: 1.9      TPro  5.7    /  Alb  2.5    /  TBili  1.6    /  DBili  x      /  AST  18     /  ALT  17     /  AlkPhos  161    03 Nov 2019 03:30    ( 11-03 @ 03:30 )   PT: 13.4 SEC;   INR: 1.20   aPTT: 27.4 SEC          ASSESSMENT & PLAN:     For Follow-Up:  -f/u repeat BCx, Ucx, IR nephrostomy Cx and adjust abx accordingly.   -trend H&H, heightened surveillance of stool for melena   -f/u GI recs for melena  -uro states no need for intervention for ureteral stone until infection clears, if stay prolonged may  or other urologic concerns arise consider uro consult.  -will restart AC today, monitor nephrostomy site for bleeding  - Confirm Med rec   - Restart levothyroxine MICU Transfer Note  ---------------------------    Transfer from: MICU  Transfer to:  ( X ) Medicine  7n 721  (  ) Telemetry    (  ) RCU    (  ) Palliative    (  ) Stroke Unit    (  ) _______________  Accepting Physican: Dr. Catherine      MICU COURSE    Patient is 74M PMHx HTN, HLD, CAD s/p stent, prediabetes, congenital atrophic R kidney, previous kidney stones, present as transfer from [facility] w R hydronephrosis, urosepsis 2/2 R distal ureteral stones. Patient underwent IR guided percutaneous neprhostomy tube placement on Friday 11/1 with IR. At [facility] patient in ICU, started on levo, received approx 5L crystalloid, 3u PRBC for ?lower GI bleed (patient reported melanotic stool, +FOBT) however initial CBC w marked hemodilution so Hgb at time of admission unclear. BCx+ for E.Coli bacteremia. No reported episodes of large melena or BRBPR during this hospital stay. GI and urology consulted. Urology states no role for acute intervention in ureteral stones during this hospital stay; patient can f/u OP w Dr. Meza. Patient now off levo, continuing w IV abx.         OBJECTIVE --  Vital Signs Last 24 Hrs  T(C): 37.1 (03 Nov 2019 04:00), Max: 37.3 (02 Nov 2019 18:35)  T(F): 98.7 (03 Nov 2019 04:00), Max: 99.1 (02 Nov 2019 18:35)  HR: 73 (03 Nov 2019 07:00) (66 - 92)  BP: 135/73 (03 Nov 2019 07:00) (91/71 - 137/76)  BP(mean): 88 (03 Nov 2019 07:00) (55 - 100)  RR: 19 (03 Nov 2019 07:00) (16 - 25)  SpO2: 97% (03 Nov 2019 07:00) (92% - 99%)    I&O's Summary    02 Nov 2019 08:01  -  03 Nov 2019 07:00  --------------------------------------------------------  IN: 1282 mL / OUT: 2745 mL / NET: -1463 mL        MEDICATIONS  (STANDING):  cefepime   IVPB 1000 milliGRAM(s) IV Intermittent every 24 hours  chlorhexidine 4% Liquid 1 Application(s) Topical daily  norepinephrine Infusion 0.05 MICROgram(s)/kG/Min (4.5 mL/Hr) IV Continuous <Continuous>    MEDICATIONS  (PRN):  albuterol/ipratropium for Nebulization. 3 milliLiter(s) Nebulizer every 4 hours PRN Wheezing  melatonin 3 milliGRAM(s) Oral at bedtime PRN Insomnia        LABS                                            11.3                  Neurophils% (auto):   86.0   (11-03 @ 03:30):    14.63)-----------(68           Lymphocytes% (auto):  4.2                                           34.7                   Eosinphils% (auto):   2.1      Manual%: Neutrophils x    ; Lymphocytes x    ; Eosinophils x    ; Bands%: x    ; Blasts x                                    139    |  107    |  43                  Calcium: 9.2   / iCa: x      (11-03 @ 03:30)    ----------------------------<  109       Magnesium: 2.1                              3.5     |  19     |  3.52             Phosphorous: 1.9      TPro  5.7    /  Alb  2.5    /  TBili  1.6    /  DBili  x      /  AST  18     /  ALT  17     /  AlkPhos  161    03 Nov 2019 03:30    ( 11-03 @ 03:30 )   PT: 13.4 SEC;   INR: 1.20   aPTT: 27.4 SEC          ASSESSMENT & PLAN:     74M PMHx CAD s/p stent, HTN, HLD, hypothyroidism, congenital atrophy of Rt kidney, previous kidney stones, obesity, with R hydronephrosis and urosepsis 2/2 R distal ureteral stones s/p R percutaneous nephrostomy tube.     Neuro:  -Patient AAOx3    CV  -Patient w CAD s/p stent, HTN, HLD  -Shock etiology likely multifactorial (septic, hypovolemic shock)  -Septic Shock 2/2 urosepsis c/b obstructive uropathy  -hypovolemic shock 2/2 ?GI bleed (baseline Hgb 11, p/w Hgb 9.6) as patient w reported melanotic stool and FOBT positive.   -s/p 3u PRBC, 1u platelets.   -monitor fluid status   -HTN- Hold antihypertensives in the setting of shock  -HLD- c/w statin therapy   -CAD- Hold asa in setting of thrombocytopenia    Respiratory  -currently on RA, supplemental oxygen via NC as needed  -CT scan demonstrates ANNA lung nodules; will need OP f/u.     GI  -patient reports melena x2 days w +FOBT.  -CT scan demonstrates colonic diverticulosis w/out diverticulitis  -s/p 3u PRBC, 1u platelets in [facility],   -trend H&H (CBC q12hr)  -observe BM   -per GI c/s -- no recs     Renal/  Ongoing HALEY with increasing serum Cr  -Hx R congenital atrophic kidney, found to have R distal ureteral stones  -CT findings c/w cystitis  -s/p R percutaneous nephrostomy tube on 11/1; continue to monitor urine output  -c/w cefepime for UTI     Heme:   -Patient w hypovolemia, anemia in the setting of reported melanotic stool  -Patient s/p 3u PRBC, 1u platelets.   -Trend H&H (CBC q12hr)  -monitor coags  -active T&S    ID  -UA+  -f/u BCx, UCx at [facility]  -s/p 1g ceftriaxone, 2g cefepime at [facility]  -c/w cefepime (day 4) pending sensitivities     Endo:   -Prediabetes  -continue to monitor FS and ISS     DVT prophylaxis:  -holding AC in setting of GI bleed  -SCDs         For Follow-Up:  -f/u repeat BCx, Ucx, IR nephrostomy Cx and adjust abx accordingly.   -trend H&H, heightened surveillance of stool for melena   -f/u GI recs for melena  -uro states no need for intervention for ureteral stone until infection clears, if stay prolonged may  or other urologic concerns arise consider uro consult.  -will restart AC today, monitor nephrostomy site for bleeding  - Confirm Med rec   - Restart levothyroxine MICU Transfer Note  ---------------------------    Transfer from: MICU  Transfer to:  ( X ) Medicine  7n 721  (  ) Telemetry    (  ) RCU    (  ) Palliative    (  ) Stroke Unit    (  ) _______________  Accepting Physican: Dr. Catherine      MICU COURSE    Patient is 74M PMHx HTN, HLD, CAD s/p stent, prediabetes, congenital atrophic R kidney, previous kidney stones, present as transfer from [facility] w R hydronephrosis, urosepsis 2/2 R distal ureteral stones. Patient underwent IR guided percutaneous neprhostomy tube placement on Friday 11/1 with IR. At [facility] patient in ICU, started on levo, received approx 5L crystalloid, 3u PRBC for ?lower GI bleed (patient reported melanotic stool, +FOBT) however initial CBC w marked hemodilution so Hgb at time of admission unclear. BCx+ for E.Coli bacteremia. No reported episodes of large melena or BRBPR during this hospital stay. GI and urology consulted. Urology states no role for acute intervention in ureteral stones during this hospital stay; patient can f/u OP w Dr. Meza. Patient now off levo, continuing w IV abx.         OBJECTIVE --  Vital Signs Last 24 Hrs  T(C): 37.1 (03 Nov 2019 04:00), Max: 37.3 (02 Nov 2019 18:35)  T(F): 98.7 (03 Nov 2019 04:00), Max: 99.1 (02 Nov 2019 18:35)  HR: 73 (03 Nov 2019 07:00) (66 - 92)  BP: 135/73 (03 Nov 2019 07:00) (91/71 - 137/76)  BP(mean): 88 (03 Nov 2019 07:00) (55 - 100)  RR: 19 (03 Nov 2019 07:00) (16 - 25)  SpO2: 97% (03 Nov 2019 07:00) (92% - 99%)    I&O's Summary    02 Nov 2019 08:01  -  03 Nov 2019 07:00  --------------------------------------------------------  IN: 1282 mL / OUT: 2745 mL / NET: -1463 mL        MEDICATIONS  (STANDING):  cefepime   IVPB 1000 milliGRAM(s) IV Intermittent every 24 hours  chlorhexidine 4% Liquid 1 Application(s) Topical daily  norepinephrine Infusion 0.05 MICROgram(s)/kG/Min (4.5 mL/Hr) IV Continuous <Continuous>    MEDICATIONS  (PRN):  albuterol/ipratropium for Nebulization. 3 milliLiter(s) Nebulizer every 4 hours PRN Wheezing  melatonin 3 milliGRAM(s) Oral at bedtime PRN Insomnia        LABS                                            11.3                  Neurophils% (auto):   86.0   (11-03 @ 03:30):    14.63)-----------(68           Lymphocytes% (auto):  4.2                                           34.7                   Eosinphils% (auto):   2.1      Manual%: Neutrophils x    ; Lymphocytes x    ; Eosinophils x    ; Bands%: x    ; Blasts x                                    139    |  107    |  43                  Calcium: 9.2   / iCa: x      (11-03 @ 03:30)    ----------------------------<  109       Magnesium: 2.1                              3.5     |  19     |  3.52             Phosphorous: 1.9      TPro  5.7    /  Alb  2.5    /  TBili  1.6    /  DBili  x      /  AST  18     /  ALT  17     /  AlkPhos  161    03 Nov 2019 03:30    ( 11-03 @ 03:30 )   PT: 13.4 SEC;   INR: 1.20   aPTT: 27.4 SEC          ASSESSMENT & PLAN:     74M PMHx CAD s/p stent, HTN, HLD, hypothyroidism, congenital atrophy of Rt kidney, previous kidney stones, obesity, with R hydronephrosis and urosepsis 2/2 R distal ureteral stones s/p R percutaneous nephrostomy tube.     Neuro:  -Patient AAOx3    CV  -Patient w CAD s/p stent, HTN, HLD  -Shock etiology likely multifactorial (septic, hypovolemic shock)  -Septic Shock 2/2 urosepsis c/b obstructive uropathy  -hypovolemic shock 2/2 ?GI bleed (baseline Hgb 11, p/w Hgb 9.6) as patient w reported melanotic stool and FOBT positive.   -s/p 3u PRBC, 1u platelets.   -monitor fluid status   -HTN- Hold antihypertensives in the setting of shock  -HLD- c/w statin therapy   -CAD- Hold asa in setting of thrombocytopenia    Respiratory  -currently on RA, supplemental oxygen via NC as needed  -CT scan demonstrates ANNA lung nodules; will need OP f/u.     GI  -patient reports melena x2 days w +FOBT.  -CT scan demonstrates colonic diverticulosis w/out diverticulitis  -s/p 3u PRBC, 1u platelets in [facility],   -trend H&H (CBC q12hr)  -observe BM   -per GI c/s -- no recs     Renal/  Ongoing HALEY with increasing serum Cr  -Hx R congenital atrophic kidney, found to have R distal ureteral stones  -CT findings c/w cystitis  -s/p R percutaneous nephrostomy tube on 11/1; continue to monitor urine output  -c/w cefepime for UTI     Heme:   -Patient w hypovolemia, anemia in the setting of reported melanotic stool  -Patient s/p 3u PRBC, 1u platelets.   -Trend H&H (CBC q12hr)  -monitor coags  -active T&S    ID  -UA+  -f/u BCx, UCx at [facility]  -s/p 1g ceftriaxone, 2g cefepime at [facility]  -c/w cefepime (day 4) pending sensitivities     Endo:   -Prediabetes  -continue to monitor FS and ISS     DVT prophylaxis:  -holding AC in setting of GI bleed  -SCDs         For Follow-Up:  -f/u repeat BCx, Ucx, IR nephrostomy Cx and adjust abx accordingly.   -trend H&H, heightened surveillance of stool for melena   -?anemia, thrombocytopenia of unknown origin w minimal response to platelets.   -f/u GI recs for melena  -uro states no need for intervention for ureteral stone until infection clears, if stay prolonged may  or other urologic concerns arise consider uro consult.  -will restart AC today, monitor nephrostomy site for bleeding  - Confirm Med rec   - Restart levothyroxine MICU Transfer Note  ---------------------------    Transfer from: MICU  Transfer to:  ( X ) Medicine  7n [id number]  (  ) Telemetry    (  ) RCU    (  ) Palliative    (  ) Stroke Unit    (  ) _______________  Accepting Physican: Dr. Catherine      MICU COURSE    Patient is 74M PMHx HTN, HLD, CAD s/p stent, prediabetes, congenital atrophic R kidney, previous kidney stones, present as transfer from [facility] w R hydronephrosis, urosepsis 2/2 R distal ureteral stones. Patient underwent IR guided percutaneous neprhostomy tube placement on Friday 11/1 with IR. At [facility] patient in ICU, started on levo, received approx 5L crystalloid, 3u PRBC for ?lower GI bleed (patient reported melanotic stool, +FOBT) however initial CBC w marked hemodilution so Hgb at time of admission unclear. BCx+ for E.Coli bacteremia. No reported episodes of large melena or BRBPR during this hospital stay. GI and urology consulted. Urology states no role for acute intervention in ureteral stones during this hospital stay; patient can f/u OP w Dr. Meza. Patient now off levo, continuing w IV abx.         OBJECTIVE --  Vital Signs Last 24 Hrs  T(C): 37.1 (03 Nov 2019 04:00), Max: 37.3 (02 Nov 2019 18:35)  T(F): 98.7 (03 Nov 2019 04:00), Max: 99.1 (02 Nov 2019 18:35)  HR: 73 (03 Nov 2019 07:00) (66 - 92)  BP: 135/73 (03 Nov 2019 07:00) (91/71 - 137/76)  BP(mean): 88 (03 Nov 2019 07:00) (55 - 100)  RR: 19 (03 Nov 2019 07:00) (16 - 25)  SpO2: 97% (03 Nov 2019 07:00) (92% - 99%)    I&O's Summary    02 Nov 2019 08:01  -  03 Nov 2019 07:00  --------------------------------------------------------  IN: 1282 mL / OUT: 2745 mL / NET: -1463 mL        MEDICATIONS  (STANDING):  cefepime   IVPB 1000 milliGRAM(s) IV Intermittent every 24 hours  chlorhexidine 4% Liquid 1 Application(s) Topical daily  norepinephrine Infusion 0.05 MICROgram(s)/kG/Min (4.5 mL/Hr) IV Continuous <Continuous>    MEDICATIONS  (PRN):  albuterol/ipratropium for Nebulization. 3 milliLiter(s) Nebulizer every 4 hours PRN Wheezing  melatonin 3 milliGRAM(s) Oral at bedtime PRN Insomnia        LABS                                            11.3                  Neurophils% (auto):   86.0   (11-03 @ 03:30):    14.63)-----------(68           Lymphocytes% (auto):  4.2                                           34.7                   Eosinphils% (auto):   2.1      Manual%: Neutrophils x    ; Lymphocytes x    ; Eosinophils x    ; Bands%: x    ; Blasts x                                    139    |  107    |  43                  Calcium: 9.2   / iCa: x      (11-03 @ 03:30)    ----------------------------<  109       Magnesium: 2.1                              3.5     |  19     |  3.52             Phosphorous: 1.9      TPro  5.7    /  Alb  2.5    /  TBili  1.6    /  DBili  x      /  AST  18     /  ALT  17     /  AlkPhos  161    03 Nov 2019 03:30    ( 11-03 @ 03:30 )   PT: 13.4 SEC;   INR: 1.20   aPTT: 27.4 SEC          ASSESSMENT & PLAN:     74M PMHx CAD s/p stent, HTN, HLD, hypothyroidism, congenital atrophy of Rt kidney, previous kidney stones, obesity, with R hydronephrosis and urosepsis 2/2 R distal ureteral stones s/p R percutaneous nephrostomy tube.     Neuro:  -Patient AAOx3    CV  -Patient w CAD s/p stent, HTN, HLD  -Shock etiology likely multifactorial (septic, hypovolemic shock)  -Septic Shock 2/2 urosepsis c/b obstructive uropathy  -hypovolemic shock 2/2 ?GI bleed (baseline Hgb 11, p/w Hgb 9.6) as patient w reported melanotic stool and FOBT positive.   -s/p 3u PRBC, 1u platelets.   -monitor fluid status   -HTN- Hold antihypertensives in the setting of shock  -HLD- c/w statin therapy   -CAD- Hold asa in setting of thrombocytopenia    Respiratory  -currently on RA, supplemental oxygen via NC as needed  -CT scan demonstrates ANNA lung nodules; will need OP f/u.     GI  -patient reports melena x2 days w +FOBT.  -CT scan demonstrates colonic diverticulosis w/out diverticulitis  -s/p 3u PRBC, 1u platelets in [facility],   -trend H&H (CBC q12hr)  -observe BM   -per GI c/s -- no recs     Renal/  Ongoing HALEY with increasing serum Cr  -Hx R congenital atrophic kidney, found to have R distal ureteral stones  -CT findings c/w cystitis  -s/p R percutaneous nephrostomy tube on 11/1; continue to monitor urine output  -c/w cefepime for UTI     Heme:   -Patient w hypovolemia, anemia in the setting of reported melanotic stool  -Patient s/p 3u PRBC, 1u platelets.   -Trend H&H (CBC q12hr)  -monitor coags  -active T&S    ID  -UA+  -f/u BCx, UCx at [facility]  -s/p 1g ceftriaxone, 2g cefepime at [facility]  -c/w cefepime (day 4) pending sensitivities     Endo:   -Prediabetes  -continue to monitor FS and ISS     DVT prophylaxis:  -holding AC in setting of GI bleed  -SCDs       For Follow-Up:  -f/u repeat BCx, Ucx, IR nephrostomy Cx and adjust abx accordingly.   -trend H&H, heightened surveillance of stool for melena   -?anemia, thrombocytopenia of unknown origin w minimal response to platelets.   -f/u GI recs for melena  -uro states no need for intervention for ureteral stone until infection clears, if stay prolonged may  or other urologic concerns arise consider uro consult.  -will restart AC today, monitor nephrostomy site for bleeding  - Confirm Med rec   - Restart home meds as needed  - Follow up repeat cultures.

## 2019-11-03 NOTE — PROGRESS NOTE ADULT - SUBJECTIVE AND OBJECTIVE BOX
INTERVAL HPI/OVERNIGHT EVENTS:    green loose stool yesterday afternoon   has some bloating   no n/v  no melena     MEDICATIONS  (STANDING):  acetaminophen   Tablet .. 650 milliGRAM(s) Oral once  cefepime   IVPB 1000 milliGRAM(s) IV Intermittent every 24 hours  chlorhexidine 4% Liquid 1 Application(s) Topical daily  norepinephrine Infusion 0.05 MICROgram(s)/kG/Min (4.5 mL/Hr) IV Continuous <Continuous>  potassium acid phosphate/sodium acid phosphate tablet (K-PHOS No. 2) 1 Tablet(s) Oral four times a day with meals    MEDICATIONS  (PRN):  albuterol/ipratropium for Nebulization. 3 milliLiter(s) Nebulizer every 4 hours PRN Wheezing  melatonin 3 milliGRAM(s) Oral at bedtime PRN Insomnia      Allergies    No Known Allergies    Intolerances        Review of Systems:    General:  No wt loss, fevers, chills, night sweats, fatigue   Eyes:  Good vision, no reported pain  ENT:  No sore throat, pain, runny nose, dysphagia  CV:  No pain, palpitations, hypo/hypertension  Resp:  No dyspnea, cough, tachypnea, wheezing  GI:  No pain, No nausea, No vomiting, No diarrhea, No constipation, No weight loss, No fever, No pruritis, No rectal bleeding, No melena, No dysphagia  :  No pain, bleeding, incontinence, nocturia  Muscle:  No pain, weakness  Neuro:  No weakness, tingling, memory problems  Psych:  No fatigue, insomnia, mood problems, depression  Endocrine:  No polyuria, polydypsia, cold/heat intolerance  Heme:  No petechiae, ecchymosis, easy bruisability  Skin:  No rash, tattoos, scars, edema      Vital Signs Last 24 Hrs  T(C): 36.6 (03 Nov 2019 08:00), Max: 37.3 (02 Nov 2019 18:35)  T(F): 97.9 (03 Nov 2019 08:00), Max: 99.1 (02 Nov 2019 18:35)  HR: 73 (03 Nov 2019 08:00) (66 - 88)  BP: 144/71 (03 Nov 2019 08:00) (91/71 - 144/71)  BP(mean): 86 (03 Nov 2019 08:00) (55 - 100)  RR: 17 (03 Nov 2019 08:00) (16 - 23)  SpO2: 96% (03 Nov 2019 08:00) (92% - 99%)    PHYSICAL EXAM:    Constitutional: NAD  HEENT: EOMI, throat clear  Neck: No LAD, supple  Respiratory: CTA and P  Cardiovascular: S1 and S2, RRR, no M  Gastrointestinal: BS+, soft, NT/ND, neg HSM,  Extremities: No peripheral edema, neg clubbing, cyanosis  Vascular: 2+ peripheral pulses  Neurological: A/O x 3, no focal deficits  Psychiatric: Normal mood, normal affect  Skin: No rashes      LABS:                        11.3   14.63 )-----------( 68       ( 03 Nov 2019 03:30 )             34.7     11-03    139  |  107  |  43<H>  ----------------------------<  109<H>  3.5   |  19<L>  |  3.52<H>    Ca    9.2      03 Nov 2019 03:30  Phos  1.9     11-03  Mg     2.1     11-03    TPro  5.7<L>  /  Alb  2.5<L>  /  TBili  1.6<H>  /  DBili  x   /  AST  18  /  ALT  17  /  AlkPhos  161<H>  11-03    PT/INR - ( 03 Nov 2019 03:30 )   PT: 13.4 SEC;   INR: 1.20          PTT - ( 03 Nov 2019 03:30 )  PTT:27.4 SEC      RADIOLOGY & ADDITIONAL TESTS:

## 2019-11-03 NOTE — CHART NOTE - NSCHARTNOTEFT_GEN_A_CORE
MICU Transfer Note    Transfer from: MICU  Transfer to:  (X) Medicine    (  ) Telemetry    (  ) RCU    (  ) Palliative    (  ) Stroke Unit    (  ) _______________  Accepting physican: Florin      MICU COURSE:    74M PMHx HTN, HLD, CAD s/p stent, prediabetes, congenital atrophic R kidney, previous kidney stones, present as transfer from [facility] w R hydronephrosis, urosepsis 2/2 R distal ureteral stones. Patient underwent IR guided percutaneous neprhostomy tube placement on Friday 11/1 with IR. At [facility] patient in ICU, started on levo, received approx 5L crystalloid, 3u PRBC for ?lower GI bleed (patient reported melanotic stool, +FOBT) however initial CBC w marked hemodilution so Hgb at time of admission unclear. BCx+ for E.Coli bacteremia. No reported episodes of large melena or BRBPR during this hospital stay. GI and urology consulted. Urology states no role for acute intervention in ureteral stones during this hospital stay; patient can f/u OP w Dr. Meza. Patient now off levo, continuing w IV abx.     Pt seen and examined at bedside. Denies headaches, F/C, dizziness, syncope, CP/SOB, N/V, abdominal pain, dysuria, changes in BM, peripheral swelling, skin changes, new joint aches. Tolerating PO but reports not as hungry as normal.        Vital Signs Last 24 Hrs  T(C): 36.6 (03 Nov 2019 13:03), Max: 37.3 (02 Nov 2019 18:35)  T(F): 97.8 (03 Nov 2019 13:03), Max: 99.1 (02 Nov 2019 18:35)  HR: 72 (03 Nov 2019 13:03) (66 - 81)  BP: 133/72 (03 Nov 2019 13:03) (91/71 - 144/71)  BP(mean): 75 (03 Nov 2019 12:00) (55 - 93)  RR: 16 (03 Nov 2019 13:03) (16 - 23)  SpO2: 100% (03 Nov 2019 13:03) (92% - 100%)  I&O's Summary    02 Nov 2019 08:01  -  03 Nov 2019 07:00  --------------------------------------------------------  IN: 1282 mL / OUT: 2745 mL / NET: -1463 mL    03 Nov 2019 07:01  -  03 Nov 2019 17:06  --------------------------------------------------------  IN: 250 mL / OUT: 1075 mL / NET: -825 mL      On physical exam:  Gen: NAD  Chest/CV: RRR, +2 peripheral pulses  Pulm: CTAB/L  Abd: soft, NT, ND +BS  : [place] draining straw yellow fluid  MSK: no peripheral edema/cyanosis     MEDICATIONS  (STANDING):  cefepime   IVPB 1000 milliGRAM(s) IV Intermittent every 24 hours  chlorhexidine 4% Liquid 1 Application(s) Topical daily  pantoprazole  Injectable 40 milliGRAM(s) IV Push daily  potassium acid phosphate/sodium acid phosphate tablet (K-PHOS No. 2) 1 Tablet(s) Oral four times a day with meals    MEDICATIONS  (PRN):  albuterol/ipratropium for Nebulization. 3 milliLiter(s) Nebulizer every 4 hours PRN Wheezing  melatonin 3 milliGRAM(s) Oral at bedtime PRN Insomnia  simethicone 80 milliGRAM(s) Chew four times a day PRN Gas        LABS                                            11.3                  Neurophils% (auto):   86.0   (11-03 @ 03:30):    14.63)-----------(68           Lymphocytes% (auto):  4.2                                           34.7                   Eosinphils% (auto):   2.1      Manual%: Neutrophils x    ; Lymphocytes x    ; Eosinophils x    ; Bands%: x    ; Blasts x                                    139    |  107    |  43                  Calcium: 9.2   / iCa: x      (11-03 @ 03:30)    ----------------------------<  109       Magnesium: 2.1                              3.5     |  19     |  3.52             Phosphorous: 1.9      TPro  5.7    /  Alb  2.5    /  TBili  1.6    /  DBili  x      /  AST  18     /  ALT  17     /  AlkPhos  161    03 Nov 2019 03:30    ( 11-03 @ 03:30 )   PT: 13.4 SEC;   INR: 1.20   aPTT: 27.4 SEC      A/P: 74M PMHx CAD s/p stent, HTN, HLD, hypothyroidism, congenital atrophy of Rt kidney, previous kidney stones, obesity, with R hydronephrosis and urosepsis 2/2 R distal ureteral stones s/p R percutaneous nephrostomy tube.     Follow up:  -f/u repeat BCx, Ucx, IR nephrostomy Cx and adjust abx accordingly.   -trend H&H, heightened surveillance of stool for melena   -?anemia, thrombocytopenia of unknown origin w minimal response to platelets.   -f/u GI recs for melena  -uro states no need for intervention for ureteral stone until infection clears, if stay prolonged may  or other urologic concerns arise consider uro consult.  -will restart AC today, monitor nephrostomy site for bleeding  - Confirm Med rec   - Restart home meds as needed  - Follow up repeat cultures.    JOSETTE Meyers, PGY-3  MAR

## 2019-11-03 NOTE — PROGRESS NOTE ADULT - PROBLEM SELECTOR PLAN 2
- R hydronephrosis and urosepsis 2/2 R distal ureteral stones s/p R percutaneous nephrostomy tube  - care per ICU and nephrology appreciated

## 2019-11-03 NOTE — PROGRESS NOTE ADULT - SUBJECTIVE AND OBJECTIVE BOX
PATIENT: MARIN GARCIA   AGE: o     CHIEF COMPLAINT:  Patient is a 74y old  Male who presents with a chief complaint of urosepsis (02 Nov 2019 13:15)    OVERNIGHT EVENTS:  No acute overnight events. Patient was wean off levophed.     SUBJECTIVE: Patient seen and examined at bedside.     REVIEW OF SYSTEM:  CONSTITUTIONAL: No weakness, fevers or chills  EYES/ENT: No visual changes;  No vertigo or throat pain   NECK: No pain or stiffness  RESPIRATORY: No cough, wheezing, hemoptysis; No shortness of breath  CARDIOVASCULAR: No chest pain or palpitations  GASTROINTESTINAL: No abdominal or epigastric pain. No nausea, vomiting, or hematemesis; No diarrhea or constipation. No melena or hematochezia.  GENITOURINARY: No dysuria, frequency or hematuria  NEUROLOGICAL: No numbness or weakness  SKIN: No itching, rashes    OBJECTIVE:    VITAL SIGNS:  ICU Vital Signs Last 24 Hrs  T(C): 37.1 (03 Nov 2019 04:00), Max: 37.3 (02 Nov 2019 18:35)  T(F): 98.7 (03 Nov 2019 04:00), Max: 99.1 (02 Nov 2019 18:35)  HR: 73 (03 Nov 2019 07:00) (66 - 92)  BP: 135/73 (03 Nov 2019 07:00) (91/71 - 137/76)  BP(mean): 88 (03 Nov 2019 07:00) (55 - 100)  ABP: --  ABP(mean): --  RR: 19 (03 Nov 2019 07:00) (16 - 25)  SpO2: 97% (03 Nov 2019 07:00) (92% - 99%)        11-02 @ 08:01  -  11-03 @ 07:00  --------------------------------------------------------  IN: 1282 mL / OUT: 2745 mL / NET: -1463 mL      CAPILLARY BLOOD GLUCOSE      POCT Blood Glucose.: 114 mg/dL (01 Nov 2019 09:35)      I/O SUMMARY:    11-02-19 @ 08:01  -  11-03-19 @ 07:00  --------------------------------------------------------  IN: 1282 mL / OUT: 2745 mL / NET: -1463 mL    PHYSICAL EXAM:  General: NAD  HEENT: NC/AT; PERRL, clear conjunctiva  Neck: supple  Respiratory: +expiratory wheezing   Cardiovascular: +S1/S2; RRR  Abdomen: soft, NT/ND; +BS x4  Extremities: WWP, 2+ peripheral pulses b/l; no LE edema  Skin: normal color and turgor; no rash  Neurological: nonfocal     MEDICATIONS:  MEDICATIONS  (STANDING):  cefepime   IVPB 1000 milliGRAM(s) IV Intermittent every 24 hours  chlorhexidine 4% Liquid 1 Application(s) Topical daily  norepinephrine Infusion 0.05 MICROgram(s)/kG/Min (4.5 mL/Hr) IV Continuous <Continuous>    MEDICATIONS  (PRN):  albuterol/ipratropium for Nebulization. 3 milliLiter(s) Nebulizer every 4 hours PRN Wheezing  melatonin 3 milliGRAM(s) Oral at bedtime PRN Insomnia      ALLERGIES:  Allergies    No Known Allergies    Intolerances        LABS:                        11.3   14.63 )-----------( 68       ( 03 Nov 2019 03:30 )             34.7     11-03    139  |  107  |  43<H>  ----------------------------<  109<H>  3.5   |  19<L>  |  3.52<H>    Ca    9.2      03 Nov 2019 03:30  Phos  1.9     11-03  Mg     2.1     11-03    TPro  5.7<L>  /  Alb  2.5<L>  /  TBili  1.6<H>  /  DBili  x   /  AST  18  /  ALT  17  /  AlkPhos  161<H>  11-03    LIVER FUNCTIONS - ( 03 Nov 2019 03:30 )  Alb: 2.5 g/dL / Pro: 5.7 g/dL / ALK PHOS: 161 u/L / ALT: 17 u/L / AST: 18 u/L / GGT: x           COAGULATION STUDIES:     aPTT  27.4 SEC    (11-03-19 @ 03:30)     PT     13.4 SEC    (11-03-19 @ 03:30)     INR    1.20          (11-03-19 @ 03:30), COAGULATION STUDIES:     aPTT  22.2 SEC    (11-02-19 @ 03:30)     PT     14.4 SEC    (11-02-19 @ 03:30)     INR    1.25          (11-02-19 @ 03:30), COAGULATION STUDIES:     aPTT  23.9 SEC    (11-01-19 @ 14:02)     PT     15.0 SEC    (11-01-19 @ 14:02)     INR    1.31          (11-01-19 @ 14:02)   PT/INR - ( 03 Nov 2019 03:30 )   PT: 13.4 SEC;   INR: 1.20          PTT - ( 03 Nov 2019 03:30 )  PTT:27.4 SEC              MICROBIOLOGY:    Culture - Blood (collected 11-02-19 @ 04:20)  Source: BLOOD PERIPHERAL  Preliminary Report (11-03-19 @ 04:21):    NO ORGANISMS ISOLATED    NO ORGANISMS ISOLATED AT 24 HOURS    Culture - Blood (collected 11-02-19 @ 04:20)  Source: BLOOD VENOUS  Preliminary Report (11-03-19 @ 04:21):    NO ORGANISMS ISOLATED    NO ORGANISMS ISOLATED AT 24 HOURS    Culture - Urine (collected 11-01-19 @ 20:10)  Source: URINE  Preliminary Report (11-02-19 @ 12:18):    EC^Escherichia coli    COLONY COUNT: > = 100,000 CFU/ML    Culture - Urine (collected 10-31-19 @ 13:59)  Source: .Urine  Final Report (11-02-19 @ 15:50):    >100,000 CFU/ml Escherichia coli  Organism: Escherichia coli (11-02-19 @ 15:50)  Organism: Escherichia coli (11-02-19 @ 15:50)    Culture - Blood (collected 10-31-19 @ 10:58)  Source: .Blood  Preliminary Report (11-01-19 @ 11:01):    No growth to date.    Culture - Blood (collected 10-31-19 @ 10:58)  Source: .Blood  Gram Stain (11-01-19 @ 02:27):    Growth in anaerobic bottle: Gram Negative Rods    Growth in aerobic bottle: Gram Negative Rods  Final Report (11-02-19 @ 11:14):    Growth in aerobic and anaerobic bottles: Escherichia coli    "Due to technical problems, Proteus sp. will Not be reported as part of    the BCID panel until further notice"    ***Blood Panel PCR results on this specimen are available    approximately 3 hours after the Gram stain result.***    Gram stain, PCR, and/or culture results may not always    correspond due to difference in methodologies.    ************************************************************    This PCR assay was performed using [employer].    The following targets are tested for: Enterococcus,    vancomycin resistant enterococci, Listeria monocytogenes,    coagulase negative staphylococci, S. aureus,    methicillin resistant S. aureus, Streptococcus agalactiae    (Group B), S. pneumoniae, S.pyogenes (Group A),    Acinetobacter baumannii, Enterobacter cloacae, E. coli,    Klebsiella oxytoca, K. pneumoniae, Proteus sp.,    Serratia marcescens, Haemophilus influenzae,    Neisseria meningitidis, Pseudomonas aeruginosa, Candida    albicans, C. glabrata, C krusei, C parapsilosis,    C. tropicalis and the KPC resistance gene.  Organism: Blood Culture PCR  Escherichia coli (11-02-19 @ 11:14)  Organism: Escherichia coli (11-02-19 @ 11:14)  Organism: Blood Culture PCR (11-02-19 @ 11:14)    RADIOLOGY & ADDITIONAL TESTS: Reviewed.    < from: CT Abdomen and Pelvis No Cont (10.31.19 @ 08:37) >  Findings:    Chest: No pleural or pericardial effusion. The heart is not enlarged.   Aortic and coronary artery calcifications are present. Mild ectasia of   the ascending aorta at 3.7 cm in diameter. 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 Allowing for the noncontrast technique, there is no   grossly enlarged mediastinal, hilar, or axillary lymph node.    The trachea and central bronchi are patent.    No evidence for pneumothorax or pulmonary consolidation. Small bilateral   atelectasis. Nonspecific 4 mm subpleural right middle lobe lung nodule   (image 77 series 2). Nonspecific 4 mm subpleural left lower lobe lung   nodule (image 25 series 2). If the patient is in the high risk category   (i.e. smoker), follow-up chest CT may be pursued in 12 months to ensure   stability.    Abdomen/pelvis: Evaluation of the abdomen and pelvis is limited by lack   of IV and oral contrast. Allowing for the noncontrast technique, the   liver, common bile duct, pancreas, and spleen appear grossly   unremarkable. Nonspecific mild adrenal thickening bilaterally.   Cholecystectomy clips are present.    There are 2 right distal ureteral calculi measuring up to 1.2 cm with   associated moderate to severe right hydroureteronephrosis. The right   kidney is atrophic. Small hypodense lesion in the right kidney,   representing a probable cyst. There is a small nonobstructive calculus in   the left kidney. No evidence for a calculus in the left ureter. There is   compensatory hypertrophy of the left kidney. Small hypodense lesion in   the left kidney, representing a probable cyst. No left hydronephrosis.    The appendix appears normal. Colonic diverticulosis without evidence for   diverticulitis. Nonspecific submucosal fat deposition of the ascending   colon. No bowel obstruction or grossly thickened bowel wall.    Small fat-containing periumbilical hernia.     No evidence for free air, or ascites. Mildly enlarged external iliac   chain lymph nodes bilaterally measuring up to 1.3 cm on the right and 1.1   cm on the left.     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    Geronimo catheter in the urinary bladder. The urinary bladder is collapsed,   rendering evaluation of the urinary bladder wall difficult.  Mild   stranding adjacent to the urinary bladder, suggestive of cystitis. The   prostate and seminal vesicles appear grossly unremarkable.    Degenerative spondylosis. 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    Impression:    No evidence for pneumothorax or pulmonary consolidation.    Small bilateral lung nodules; if the patient is in the high risk category   (i.e. smoker), follow-up chest CT may be pursued in 12 months to ensure   stability.    The appendix appears normal. Colonic diverticulosis without evidence for   diverticulitis. No bowelobstruction or grossly thickened bowel wall.    Mildly enlarged external iliac chain lymph nodes bilaterally measuring up   to 1.3 cm on the right and 1.1 cm on the left.    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    Mild stranding adjacent to the urinary bladder, suggestive of cystitis.    Other findings as above. PATIENT: MARIN GARCIA   AGE: o     CHIEF COMPLAINT:  Patient is a 74y old  Male who presents with a chief complaint of urosepsis (02 Nov 2019 13:15)    OVERNIGHT EVENTS:  No acute overnight events. Patient was wean off levophed.     SUBJECTIVE: Patient seen and examined at bedside.     REVIEW OF SYSTEM:  CONSTITUTIONAL: No weakness, fevers or chills  EYES/ENT: No visual changes;  No vertigo or throat pain   NECK: No pain or stiffness  RESPIRATORY: No cough, wheezing, hemoptysis; No shortness of breath  CARDIOVASCULAR: No chest pain or palpitations  GASTROINTESTINAL: No abdominal or epigastric pain. No nausea, vomiting, or hematemesis; No diarrhea or constipation. No melena or hematochezia.  GENITOURINARY: No dysuria, frequency or hematuria  NEUROLOGICAL: Headaches. No numbness or weakness  SKIN: No itching, rashes    OBJECTIVE:    VITAL SIGNS:  ICU Vital Signs Last 24 Hrs  T(C): 37.1 (03 Nov 2019 04:00), Max: 37.3 (02 Nov 2019 18:35)  T(F): 98.7 (03 Nov 2019 04:00), Max: 99.1 (02 Nov 2019 18:35)  HR: 73 (03 Nov 2019 07:00) (66 - 92)  BP: 135/73 (03 Nov 2019 07:00) (91/71 - 137/76)  BP(mean): 88 (03 Nov 2019 07:00) (55 - 100)  ABP: --  ABP(mean): --  RR: 19 (03 Nov 2019 07:00) (16 - 25)  SpO2: 97% (03 Nov 2019 07:00) (92% - 99%)        11-02 @ 08:01  -  11-03 @ 07:00  --------------------------------------------------------  IN: 1282 mL / OUT: 2745 mL / NET: -1463 mL      CAPILLARY BLOOD GLUCOSE      POCT Blood Glucose.: 114 mg/dL (01 Nov 2019 09:35)      I/O SUMMARY:    11-02-19 @ 08:01  -  11-03-19 @ 07:00  --------------------------------------------------------  IN: 1282 mL / OUT: 2745 mL / NET: -1463 mL    PHYSICAL EXAM:  General: NAD  HEENT: NC/AT; PERRL, clear conjunctiva  Neck: supple  Respiratory: +expiratory wheezing   Cardiovascular: +S1/S2; RRR  Abdomen: soft, NT/ND; +BS x4  Extremities: WWP, 2+ peripheral pulses b/l; no LE edema  Skin: normal color and turgor; no rash  Neurological: nonfocal     MEDICATIONS:  MEDICATIONS  (STANDING):  cefepime   IVPB 1000 milliGRAM(s) IV Intermittent every 24 hours  chlorhexidine 4% Liquid 1 Application(s) Topical daily  norepinephrine Infusion 0.05 MICROgram(s)/kG/Min (4.5 mL/Hr) IV Continuous <Continuous>    MEDICATIONS  (PRN):  albuterol/ipratropium for Nebulization. 3 milliLiter(s) Nebulizer every 4 hours PRN Wheezing  melatonin 3 milliGRAM(s) Oral at bedtime PRN Insomnia      ALLERGIES:  Allergies    No Known Allergies    Intolerances        LABS:                        11.3   14.63 )-----------( 68       ( 03 Nov 2019 03:30 )             34.7     11-03    139  |  107  |  43<H>  ----------------------------<  109<H>  3.5   |  19<L>  |  3.52<H>    Ca    9.2      03 Nov 2019 03:30  Phos  1.9     11-03  Mg     2.1     11-03    TPro  5.7<L>  /  Alb  2.5<L>  /  TBili  1.6<H>  /  DBili  x   /  AST  18  /  ALT  17  /  AlkPhos  161<H>  11-03    LIVER FUNCTIONS - ( 03 Nov 2019 03:30 )  Alb: 2.5 g/dL / Pro: 5.7 g/dL / ALK PHOS: 161 u/L / ALT: 17 u/L / AST: 18 u/L / GGT: x           COAGULATION STUDIES:     aPTT  27.4 SEC    (11-03-19 @ 03:30)     PT     13.4 SEC    (11-03-19 @ 03:30)     INR    1.20          (11-03-19 @ 03:30), COAGULATION STUDIES:     aPTT  22.2 SEC    (11-02-19 @ 03:30)     PT     14.4 SEC    (11-02-19 @ 03:30)     INR    1.25          (11-02-19 @ 03:30), COAGULATION STUDIES:     aPTT  23.9 SEC    (11-01-19 @ 14:02)     PT     15.0 SEC    (11-01-19 @ 14:02)     INR    1.31          (11-01-19 @ 14:02)   PT/INR - ( 03 Nov 2019 03:30 )   PT: 13.4 SEC;   INR: 1.20          PTT - ( 03 Nov 2019 03:30 )  PTT:27.4 SEC              MICROBIOLOGY:    Culture - Blood (collected 11-02-19 @ 04:20)  Source: BLOOD PERIPHERAL  Preliminary Report (11-03-19 @ 04:21):    NO ORGANISMS ISOLATED    NO ORGANISMS ISOLATED AT 24 HOURS    Culture - Blood (collected 11-02-19 @ 04:20)  Source: BLOOD VENOUS  Preliminary Report (11-03-19 @ 04:21):    NO ORGANISMS ISOLATED    NO ORGANISMS ISOLATED AT 24 HOURS    Culture - Urine (collected 11-01-19 @ 20:10)  Source: URINE  Preliminary Report (11-02-19 @ 12:18):    EC^Escherichia coli    COLONY COUNT: > = 100,000 CFU/ML    Culture - Urine (collected 10-31-19 @ 13:59)  Source: .Urine  Final Report (11-02-19 @ 15:50):    >100,000 CFU/ml Escherichia coli  Organism: Escherichia coli (11-02-19 @ 15:50)  Organism: Escherichia coli (11-02-19 @ 15:50)    Culture - Blood (collected 10-31-19 @ 10:58)  Source: .Blood  Preliminary Report (11-01-19 @ 11:01):    No growth to date.    Culture - Blood (collected 10-31-19 @ 10:58)  Source: .Blood  Gram Stain (11-01-19 @ 02:27):    Growth in anaerobic bottle: Gram Negative Rods    Growth in aerobic bottle: Gram Negative Rods  Final Report (11-02-19 @ 11:14):    Growth in aerobic and anaerobic bottles: Escherichia coli    "Due to technical problems, Proteus sp. will Not be reported as part of    the BCID panel until further notice"    ***Blood Panel PCR results on this specimen are available    approximately 3 hours after the Gram stain result.***    Gram stain, PCR, and/or culture results may not always    correspond due to difference in methodologies.    ************************************************************    This PCR assay was performed using [employer].    The following targets are tested for: Enterococcus,    vancomycin resistant enterococci, Listeria monocytogenes,    coagulase negative staphylococci, S. aureus,    methicillin resistant S. aureus, Streptococcus agalactiae    (Group B), S. pneumoniae, S.pyogenes (Group A),    Acinetobacter baumannii, Enterobacter cloacae, E. coli,    Klebsiella oxytoca, K. pneumoniae, Proteus sp.,    Serratia marcescens, Haemophilus influenzae,    Neisseria meningitidis, Pseudomonas aeruginosa, Candida    albicans, C. glabrata, C krusei, C parapsilosis,    C. tropicalis and the KPC resistance gene.  Organism: Blood Culture PCR  Escherichia coli (11-02-19 @ 11:14)  Organism: Escherichia coli (11-02-19 @ 11:14)  Organism: Blood Culture PCR (11-02-19 @ 11:14)    RADIOLOGY & ADDITIONAL TESTS: Reviewed.    < from: CT Abdomen and Pelvis No Cont (10.31.19 @ 08:37) >  Findings:    Chest: No pleural or pericardial effusion. The heart is not enlarged.   Aortic and coronary artery calcifications are present. Mild ectasia of   the ascending aorta at 3.7 cm in diameter. 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 Allowing for the noncontrast technique, there is no   grossly enlarged mediastinal, hilar, or axillary lymph node.    The trachea and central bronchi are patent.    No evidence for pneumothorax or pulmonary consolidation. Small bilateral   atelectasis. Nonspecific 4 mm subpleural right middle lobe lung nodule   (image 77 series 2). Nonspecific 4 mm subpleural left lower lobe lung   nodule (image 25 series 2). If the patient is in the high risk category   (i.e. smoker), follow-up chest CT may be pursued in 12 months to ensure   stability.    Abdomen/pelvis: Evaluation of the abdomen and pelvis is limited by lack   of IV and oral contrast. Allowing for the noncontrast technique, the   liver, common bile duct, pancreas, and spleen appear grossly   unremarkable. Nonspecific mild adrenal thickening bilaterally.   Cholecystectomy clips are present.    There are 2 right distal ureteral calculi measuring up to 1.2 cm with   associated moderate to severe right hydroureteronephrosis. The right   kidney is atrophic. Small hypodense lesion in the right kidney,   representing a probable cyst. There is a small nonobstructive calculus in   the left kidney. No evidence for a calculus in the left ureter. There is   compensatory hypertrophy of the left kidney. Small hypodense lesion in   the left kidney, representing a probable cyst. No left hydronephrosis.    The appendix appears normal. Colonic diverticulosis without evidence for   diverticulitis. Nonspecific submucosal fat deposition of the ascending   colon. No bowel obstruction or grossly thickened bowel wall.    Small fat-containing periumbilical hernia.     No evidence for free air, or ascites. Mildly enlarged external iliac   chain lymph nodes bilaterally measuring up to 1.3 cm on the right and 1.1   cm on the left.     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    Geronimo catheter in the urinary bladder. The urinary bladder is collapsed,   rendering evaluation of the urinary bladder wall difficult.  Mild   stranding adjacent to the urinary bladder, suggestive of cystitis. The   prostate and seminal vesicles appear grossly unremarkable.    Degenerative spondylosis. 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    Impression:    No evidence for pneumothorax or pulmonary consolidation.    Small bilateral lung nodules; if the patient is in the high risk category   (i.e. smoker), follow-up chest CT may be pursued in 12 months to ensure   stability.    The appendix appears normal. Colonic diverticulosis without evidence for   diverticulitis. No bowelobstruction or grossly thickened bowel wall.    Mildly enlarged external iliac chain lymph nodes bilaterally measuring up   to 1.3 cm on the right and 1.1 cm on the left.    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    Mild stranding adjacent to the urinary bladder, suggestive of cystitis.    Other findings as above. PATIENT: MARIN GARCIA   AGE: o     CHIEF COMPLAINT:  Patient is a 74y old  Male who presents with a chief complaint of urosepsis (02 Nov 2019 13:15)    OVERNIGHT EVENTS:  No acute overnight events. Patient was wean off levophed.     SUBJECTIVE: Patient seen and examined at bedside.     REVIEW OF SYSTEM:  CONSTITUTIONAL: No weakness, fevers or chills  EYES/ENT: No visual changes;  No vertigo or throat pain   NECK: No pain or stiffness  RESPIRATORY: No cough, wheezing, hemoptysis; No shortness of breath  CARDIOVASCULAR: No chest pain or palpitations  GASTROINTESTINAL: No abdominal or epigastric pain. No nausea, vomiting, or hematemesis; No diarrhea or constipation. No melena or hematochezia.  GENITOURINARY: No dysuria, frequency or hematuria  NEUROLOGICAL: Headaches. No numbness or weakness  SKIN: No itching, rashes    OBJECTIVE:    VITAL SIGNS:  ICU Vital Signs Last 24 Hrs  T(C): 37.1 (03 Nov 2019 04:00), Max: 37.3 (02 Nov 2019 18:35)  T(F): 98.7 (03 Nov 2019 04:00), Max: 99.1 (02 Nov 2019 18:35)  HR: 73 (03 Nov 2019 07:00) (66 - 92)  BP: 135/73 (03 Nov 2019 07:00) (91/71 - 137/76)  BP(mean): 88 (03 Nov 2019 07:00) (55 - 100)  RR: 19 (03 Nov 2019 07:00) (16 - 25)  SpO2: 97% (03 Nov 2019 07:00) (92% - 99%)    11-02 @ 08:01  -  11-03 @ 07:00  --------------------------------------------------------  IN: 1282 mL / OUT: 2745 mL / NET: -1463 mL    CAPILLARY BLOOD GLUCOSE  POCT Blood Glucose.: 114 mg/dL (01 Nov 2019 09:35)      I/O SUMMARY:    11-02-19 @ 08:01  -  11-03-19 @ 07:00  --------------------------------------------------------  IN: 1282 mL / OUT: 2745 mL / NET: -1463 mL    PHYSICAL EXAM:  General: NAD  HEENT: NC/AT; PERRL, clear conjunctiva  Neck: supple  Respiratory: +expiratory wheezing   Cardiovascular: +S1/S2; RRR  Abdomen: soft, NT/ND; +BS x4  Extremities: WWP, 2+ peripheral pulses b/l; no LE edema  Skin: normal color and turgor; no rash  Neurological: nonfocal     MEDICATIONS:  MEDICATIONS  (STANDING):  cefepime   IVPB 1000 milliGRAM(s) IV Intermittent every 24 hours  chlorhexidine 4% Liquid 1 Application(s) Topical daily  norepinephrine Infusion 0.05 MICROgram(s)/kG/Min (4.5 mL/Hr) IV Continuous <Continuous>    MEDICATIONS  (PRN):  albuterol/ipratropium for Nebulization. 3 milliLiter(s) Nebulizer every 4 hours PRN Wheezing  melatonin 3 milliGRAM(s) Oral at bedtime PRN Insomnia      ALLERGIES:  Allergies    No Known Allergies    Intolerances        LABS:                        11.3   14.63 )-----------( 68       ( 03 Nov 2019 03:30 )             34.7     11-03    139  |  107  |  43<H>  ----------------------------<  109<H>  3.5   |  19<L>  |  3.52<H>    Ca    9.2      03 Nov 2019 03:30  Phos  1.9     11-03  Mg     2.1     11-03    TPro  5.7<L>  /  Alb  2.5<L>  /  TBili  1.6<H>  /  DBili  x   /  AST  18  /  ALT  17  /  AlkPhos  161<H>  11-03    LIVER FUNCTIONS - ( 03 Nov 2019 03:30 )  Alb: 2.5 g/dL / Pro: 5.7 g/dL / ALK PHOS: 161 u/L / ALT: 17 u/L / AST: 18 u/L / GGT: x           COAGULATION STUDIES:     aPTT  27.4 SEC    (11-03-19 @ 03:30)     PT     13.4 SEC    (11-03-19 @ 03:30)     INR    1.20          (11-03-19 @ 03:30), COAGULATION STUDIES:     aPTT  22.2 SEC    (11-02-19 @ 03:30)     PT     14.4 SEC    (11-02-19 @ 03:30)     INR    1.25          (11-02-19 @ 03:30), COAGULATION STUDIES:     aPTT  23.9 SEC    (11-01-19 @ 14:02)     PT     15.0 SEC    (11-01-19 @ 14:02)     INR    1.31          (11-01-19 @ 14:02)   PT/INR - ( 03 Nov 2019 03:30 )   PT: 13.4 SEC;   INR: 1.20     PTT - ( 03 Nov 2019 03:30 )  PTT:27.4 SEC          MICROBIOLOGY:    Culture - Blood (collected 11-02-19 @ 04:20)  Source: BLOOD PERIPHERAL  Preliminary Report (11-03-19 @ 04:21):    NO ORGANISMS ISOLATED    NO ORGANISMS ISOLATED AT 24 HOURS    Culture - Blood (collected 11-02-19 @ 04:20)  Source: BLOOD VENOUS  Preliminary Report (11-03-19 @ 04:21):    NO ORGANISMS ISOLATED    NO ORGANISMS ISOLATED AT 24 HOURS    Culture - Urine (collected 11-01-19 @ 20:10)  Source: URINE  Preliminary Report (11-02-19 @ 12:18):    EC^Escherichia coli    COLONY COUNT: > = 100,000 CFU/ML    Culture - Urine (collected 10-31-19 @ 13:59)  Source: .Urine  Final Report (11-02-19 @ 15:50):    >100,000 CFU/ml Escherichia coli  Organism: Escherichia coli (11-02-19 @ 15:50)  Organism: Escherichia coli (11-02-19 @ 15:50)    Culture - Blood (collected 10-31-19 @ 10:58)  Source: .Blood  Preliminary Report (11-01-19 @ 11:01):    No growth to date.    Culture - Blood (collected 10-31-19 @ 10:58)  Source: .Blood  Gram Stain (11-01-19 @ 02:27):    Growth in anaerobic bottle: Gram Negative Rods    Growth in aerobic bottle: Gram Negative Rods  Final Report (11-02-19 @ 11:14):    Growth in aerobic and anaerobic bottles: Escherichia coli    "Due to technical problems, Proteus sp. will Not be reported as part of    the BCID panel until further notice"    ***Blood Panel PCR results on this specimen are available    approximately 3 hours after the Gram stain result.***    Gram stain, PCR, and/or culture results may not always    correspond due to difference in methodologies.    ************************************************************    This PCR assay was performed using [employer].    The following targets are tested for: Enterococcus,    vancomycin resistant enterococci, Listeria monocytogenes,    coagulase negative staphylococci, S. aureus,    methicillin resistant S. aureus, Streptococcus agalactiae    (Group B), S. pneumoniae, S.pyogenes (Group A),    Acinetobacter baumannii, Enterobacter cloacae, E. coli,    Klebsiella oxytoca, K. pneumoniae, Proteus sp.,    Serratia marcescens, Haemophilus influenzae,    Neisseria meningitidis, Pseudomonas aeruginosa, Candida    albicans, C. glabrata, C krusei, C parapsilosis,    C. tropicalis and the KPC resistance gene.  Organism: Blood Culture PCR  Escherichia coli (11-02-19 @ 11:14)  Organism: Escherichia coli (11-02-19 @ 11:14)  Organism: Blood Culture PCR (11-02-19 @ 11:14)    RADIOLOGY & ADDITIONAL TESTS: Reviewed.    < from: CT Abdomen and Pelvis No Cont (10.31.19 @ 08:37) >  Findings:    Chest: No pleural or pericardial effusion. The heart is not enlarged.   Aortic and coronary artery calcifications are present. Mild ectasia of   the ascending aorta at 3.7 cm in diameter. 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 Allowing for the noncontrast technique, there is no   grossly enlarged mediastinal, hilar, or axillary lymph node.    The trachea and central bronchi are patent.    No evidence for pneumothorax or pulmonary consolidation. Small bilateral   atelectasis. Nonspecific 4 mm subpleural right middle lobe lung nodule   (image 77 series 2). Nonspecific 4 mm subpleural left lower lobe lung   nodule (image 25 series 2). If the patient is in the high risk category   (i.e. smoker), follow-up chest CT may be pursued in 12 months to ensure   stability.    Abdomen/pelvis: Evaluation of the abdomen and pelvis is limited by lack   of IV and oral contrast. Allowing for the noncontrast technique, the   liver, common bile duct, pancreas, and spleen appear grossly   unremarkable. Nonspecific mild adrenal thickening bilaterally.   Cholecystectomy clips are present.    There are 2 right distal ureteral calculi measuring up to 1.2 cm with   associated moderate to severe right hydroureteronephrosis. The right   kidney is atrophic. Small hypodense lesion in the right kidney,   representing a probable cyst. There is a small nonobstructive calculus in   the left kidney. No evidence for a calculus in the left ureter. There is   compensatory hypertrophy of the left kidney. Small hypodense lesion in   the left kidney, representing a probable cyst. No left hydronephrosis.    The appendix appears normal. Colonic diverticulosis without evidence for   diverticulitis. Nonspecific submucosal fat deposition of the ascending   colon. No bowel obstruction or grossly thickened bowel wall.    Small fat-containing periumbilical hernia.     No evidence for free air, or ascites. Mildly enlarged external iliac   chain lymph nodes bilaterally measuring up to 1.3 cm on the right and 1.1   cm on the left.     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    Geronimo catheter in the urinary bladder. The urinary bladder is collapsed,   rendering evaluation of the urinary bladder wall difficult.  Mild   stranding adjacent to the urinary bladder, suggestive of cystitis. The   prostate and seminal vesicles appear grossly unremarkable.    Degenerative spondylosis. 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    Impression:    No evidence for pneumothorax or pulmonary consolidation.    Small bilateral lung nodules; if the patient is in the high risk category   (i.e. smoker), follow-up chest CT may be pursued in 12 months to ensure   stability.    The appendix appears normal. Colonic diverticulosis without evidence for   diverticulitis. No bowelobstruction or grossly thickened bowel wall.    Mildly enlarged external iliac chain lymph nodes bilaterally measuring up   to 1.3 cm on the right and 1.1 cm on the left.    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    Mild stranding adjacent to the urinary bladder, suggestive of cystitis.    Other findings as above. PATIENT: MARIN GARCIA   AGE: o     CHIEF COMPLAINT:  Patient is a 74y old  Male who presents with a chief complaint of urosepsis (02 Nov 2019 13:15)    OVERNIGHT EVENTS:  No acute overnight events. Patient was wean off levophed.     SUBJECTIVE: Patient seen and examined at bedside.     REVIEW OF SYSTEM:  CONSTITUTIONAL: No weakness, fevers or chills  EYES/ENT: No visual changes;  No vertigo or throat pain   NECK: No pain or stiffness  RESPIRATORY: No cough, wheezing, hemoptysis; No shortness of breath  CARDIOVASCULAR: No chest pain or palpitations  GASTROINTESTINAL: No abdominal or epigastric pain. No nausea, vomiting, or hematemesis; No diarrhea or constipation. No melena or hematochezia.  GENITOURINARY: No dysuria, frequency or hematuria  NEUROLOGICAL: Headaches. No numbness or weakness  SKIN: No itching, rashes    OBJECTIVE:    VITAL SIGNS:  ICU Vital Signs Last 24 Hrs  T(C): 37.1 (03 Nov 2019 04:00), Max: 37.3 (02 Nov 2019 18:35)  T(F): 98.7 (03 Nov 2019 04:00), Max: 99.1 (02 Nov 2019 18:35)  HR: 73 (03 Nov 2019 07:00) (66 - 92)  BP: 135/73 (03 Nov 2019 07:00) (91/71 - 137/76)  BP(mean): 88 (03 Nov 2019 07:00) (55 - 100)  RR: 19 (03 Nov 2019 07:00) (16 - 25)  SpO2: 97% (03 Nov 2019 07:00) (92% - 99%)    11-02 @ 08:01  -  11-03 @ 07:00  --------------------------------------------------------  IN: 1282 mL / OUT: 2745 mL / NET: -1463 mL    CAPILLARY BLOOD GLUCOSE  POCT Blood Glucose.: 114 mg/dL (01 Nov 2019 09:35)      I/O SUMMARY:    11-02-19 @ 08:01  -  11-03-19 @ 07:00  --------------------------------------------------------  IN: 1282 mL / OUT: 2745 mL / NET: -1463 mL    PHYSICAL EXAM:  General: NAD  HEENT: NC/AT;, clear conjunctiva  Neck: supple  Respiratory: +expiratory wheezing   Cardiovascular: +S1/S2; RRR  Abdomen: soft, NT/ND; +BS x4. R nephrostomy tube.   Extremities: WWP, 2+ peripheral pulses b/l; no LE edema. Geronimo catheter.   Skin: normal color and turgor; no rash  Neurological: nonfocal     MEDICATIONS:  MEDICATIONS  (STANDING):  cefepime   IVPB 1000 milliGRAM(s) IV Intermittent every 24 hours  chlorhexidine 4% Liquid 1 Application(s) Topical daily  norepinephrine Infusion 0.05 MICROgram(s)/kG/Min (4.5 mL/Hr) IV Continuous <Continuous>    MEDICATIONS  (PRN):  albuterol/ipratropium for Nebulization. 3 milliLiter(s) Nebulizer every 4 hours PRN Wheezing  melatonin 3 milliGRAM(s) Oral at bedtime PRN Insomnia      ALLERGIES:  Allergies    No Known Allergies    Intolerances        LABS:                        11.3   14.63 )-----------( 68       ( 03 Nov 2019 03:30 )             34.7     11-03    139  |  107  |  43<H>  ----------------------------<  109<H>  3.5   |  19<L>  |  3.52<H>    Ca    9.2      03 Nov 2019 03:30  Phos  1.9     11-03  Mg     2.1     11-03    TPro  5.7<L>  /  Alb  2.5<L>  /  TBili  1.6<H>  /  DBili  x   /  AST  18  /  ALT  17  /  AlkPhos  161<H>  11-03    LIVER FUNCTIONS - ( 03 Nov 2019 03:30 )  Alb: 2.5 g/dL / Pro: 5.7 g/dL / ALK PHOS: 161 u/L / ALT: 17 u/L / AST: 18 u/L / GGT: x           COAGULATION STUDIES:     aPTT  27.4 SEC    (11-03-19 @ 03:30)     PT     13.4 SEC    (11-03-19 @ 03:30)     INR    1.20          (11-03-19 @ 03:30), COAGULATION STUDIES:     aPTT  22.2 SEC    (11-02-19 @ 03:30)     PT     14.4 SEC    (11-02-19 @ 03:30)     INR    1.25          (11-02-19 @ 03:30), COAGULATION STUDIES:     aPTT  23.9 SEC    (11-01-19 @ 14:02)     PT     15.0 SEC    (11-01-19 @ 14:02)     INR    1.31          (11-01-19 @ 14:02)   PT/INR - ( 03 Nov 2019 03:30 )   PT: 13.4 SEC;   INR: 1.20     PTT - ( 03 Nov 2019 03:30 )  PTT:27.4 SEC          MICROBIOLOGY:    Culture - Blood (collected 11-02-19 @ 04:20)  Source: BLOOD PERIPHERAL  Preliminary Report (11-03-19 @ 04:21):    NO ORGANISMS ISOLATED    NO ORGANISMS ISOLATED AT 24 HOURS    Culture - Blood (collected 11-02-19 @ 04:20)  Source: BLOOD VENOUS  Preliminary Report (11-03-19 @ 04:21):    NO ORGANISMS ISOLATED    NO ORGANISMS ISOLATED AT 24 HOURS    Culture - Urine (collected 11-01-19 @ 20:10)  Source: URINE  Preliminary Report (11-02-19 @ 12:18):    EC^Escherichia coli    COLONY COUNT: > = 100,000 CFU/ML    Culture - Urine (collected 10-31-19 @ 13:59)  Source: .Urine  Final Report (11-02-19 @ 15:50):    >100,000 CFU/ml Escherichia coli  Organism: Escherichia coli (11-02-19 @ 15:50)  Organism: Escherichia coli (11-02-19 @ 15:50)    Culture - Blood (collected 10-31-19 @ 10:58)  Source: .Blood  Preliminary Report (11-01-19 @ 11:01):    No growth to date.    Culture - Blood (collected 10-31-19 @ 10:58)  Source: .Blood  Gram Stain (11-01-19 @ 02:27):    Growth in anaerobic bottle: Gram Negative Rods    Growth in aerobic bottle: Gram Negative Rods  Final Report (11-02-19 @ 11:14):    Growth in aerobic and anaerobic bottles: Escherichia coli    "Due to technical problems, Proteus sp. will Not be reported as part of    the BCID panel until further notice"    ***Blood Panel PCR results on this specimen are available    approximately 3 hours after the Gram stain result.***    Gram stain, PCR, and/or culture results may not always    correspond due to difference in methodologies.    ************************************************************    This PCR assay was performed using ki work.    The following targets are tested for: Enterococcus,    vancomycin resistant enterococci, Listeria monocytogenes,    coagulase negative staphylococci, S. aureus,    methicillin resistant S. aureus, Streptococcus agalactiae    (Group B), S. pneumoniae, S.pyogenes (Group A),    Acinetobacter baumannii, Enterobacter cloacae, E. coli,    Klebsiella oxytoca, K. pneumoniae, Proteus sp.,    Serratia marcescens, Haemophilus influenzae,    Neisseria meningitidis, Pseudomonas aeruginosa, Candida    albicans, C. glabrata, C krusei, C parapsilosis,    C. tropicalis and the KPC resistance gene.  Organism: Blood Culture PCR  Escherichia coli (11-02-19 @ 11:14)  Organism: Escherichia coli (11-02-19 @ 11:14)  Organism: Blood Culture PCR (11-02-19 @ 11:14)    RADIOLOGY & ADDITIONAL TESTS: Reviewed.    < from: CT Abdomen and Pelvis No Cont (10.31.19 @ 08:37) >  Findings:    Chest: No pleural or pericardial effusion. The heart is not enlarged.   Aortic and coronary artery calcifications are present. Mild ectasia of   the ascending aorta at 3.7 cm in diameter. 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 Allowing for the noncontrast technique, there is no   grossly enlarged mediastinal, hilar, or axillary lymph node.    The trachea and central bronchi are patent.    No evidence for pneumothorax or pulmonary consolidation. Small bilateral   atelectasis. Nonspecific 4 mm subpleural right middle lobe lung nodule   (image 77 series 2). Nonspecific 4 mm subpleural left lower lobe lung   nodule (image 25 series 2). If the patient is in the high risk category   (i.e. smoker), follow-up chest CT may be pursued in 12 months to ensure   stability.    Abdomen/pelvis: Evaluation of the abdomen and pelvis is limited by lack   of IV and oral contrast. Allowing for the noncontrast technique, the   liver, common bile duct, pancreas, and spleen appear grossly   unremarkable. Nonspecific mild adrenal thickening bilaterally.   Cholecystectomy clips are present.    There are 2 right distal ureteral calculi measuring up to 1.2 cm with   associated moderate to severe right hydroureteronephrosis. The right   kidney is atrophic. Small hypodense lesion in the right kidney,   representing a probable cyst. There is a small nonobstructive calculus in   the left kidney. No evidence for a calculus in the left ureter. There is   compensatory hypertrophy of the left kidney. Small hypodense lesion in   the left kidney, representing a probable cyst. No left hydronephrosis.    The appendix appears normal. Colonic diverticulosis without evidence for   diverticulitis. Nonspecific submucosal fat deposition of the ascending   colon. No bowel obstruction or grossly thickened bowel wall.    Small fat-containing periumbilical hernia.     No evidence for free air, or ascites. Mildly enlarged external iliac   chain lymph nodes bilaterally measuring up to 1.3 cm on the right and 1.1   cm on the left.     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    Geronimo catheter in the urinary bladder. The urinary bladder is collapsed,   rendering evaluation of the urinary bladder wall difficult.  Mild   stranding adjacent to the urinary bladder, suggestive of cystitis. The   prostate and seminal vesicles appear grossly unremarkable.    Degenerative spondylosis. 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    Impression:    No evidence for pneumothorax or pulmonary consolidation.    Small bilateral lung nodules; if the patient is in the high risk category   (i.e. smoker), follow-up chest CT may be pursued in 12 months to ensure   stability.    The appendix appears normal. Colonic diverticulosis without evidence for   diverticulitis. No bowelobstruction or grossly thickened bowel wall.    Mildly enlarged external iliac chain lymph nodes bilaterally measuring up   to 1.3 cm on the right and 1.1 cm on the left.    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    Mild stranding adjacent to the urinary bladder, suggestive of cystitis.    Other findings as above.

## 2019-11-04 DIAGNOSIS — E03.9 HYPOTHYROIDISM, UNSPECIFIED: ICD-10-CM

## 2019-11-04 DIAGNOSIS — N13.9 OBSTRUCTIVE AND REFLUX UROPATHY, UNSPECIFIED: ICD-10-CM

## 2019-11-04 DIAGNOSIS — Z02.9 ENCOUNTER FOR ADMINISTRATIVE EXAMINATIONS, UNSPECIFIED: ICD-10-CM

## 2019-11-04 DIAGNOSIS — Z29.9 ENCOUNTER FOR PROPHYLACTIC MEASURES, UNSPECIFIED: ICD-10-CM

## 2019-11-04 DIAGNOSIS — Z79.899 OTHER LONG TERM (CURRENT) DRUG THERAPY: ICD-10-CM

## 2019-11-04 DIAGNOSIS — A41.51 SEPSIS DUE TO ESCHERICHIA COLI [E. COLI]: ICD-10-CM

## 2019-11-04 DIAGNOSIS — D69.6 THROMBOCYTOPENIA, UNSPECIFIED: ICD-10-CM

## 2019-11-04 DIAGNOSIS — I25.10 ATHEROSCLEROTIC HEART DISEASE OF NATIVE CORONARY ARTERY WITHOUT ANGINA PECTORIS: ICD-10-CM

## 2019-11-04 LAB
-  COAGULASE NEGATIVE STAPHYLOCOCCUS: SIGNIFICANT CHANGE UP
ALBUMIN SERPL ELPH-MCNC: 2.9 G/DL — LOW (ref 3.3–5)
ALP SERPL-CCNC: 204 U/L — HIGH (ref 40–120)
ALT FLD-CCNC: 24 U/L — SIGNIFICANT CHANGE UP (ref 4–41)
ANION GAP SERPL CALC-SCNC: 13 MMO/L — SIGNIFICANT CHANGE UP (ref 7–14)
APTT BLD: 27.3 SEC — LOW (ref 27.5–36.3)
AST SERPL-CCNC: 27 U/L — SIGNIFICANT CHANGE UP (ref 4–40)
BACTERIA BLD CULT: SIGNIFICANT CHANGE UP
BASOPHILS # BLD AUTO: 0.13 K/UL — SIGNIFICANT CHANGE UP (ref 0–0.2)
BASOPHILS NFR BLD AUTO: 1 % — SIGNIFICANT CHANGE UP (ref 0–2)
BILIRUB SERPL-MCNC: 1.5 MG/DL — HIGH (ref 0.2–1.2)
BUN SERPL-MCNC: 42 MG/DL — HIGH (ref 7–23)
CALCIUM SERPL-MCNC: 9.5 MG/DL — SIGNIFICANT CHANGE UP (ref 8.4–10.5)
CHLORIDE SERPL-SCNC: 110 MMOL/L — HIGH (ref 98–107)
CO2 SERPL-SCNC: 19 MMOL/L — LOW (ref 22–31)
CREAT SERPL-MCNC: 3.35 MG/DL — HIGH (ref 0.5–1.3)
CULTURE RESULTS: SIGNIFICANT CHANGE UP
EOSINOPHIL # BLD AUTO: 0.33 K/UL — SIGNIFICANT CHANGE UP (ref 0–0.5)
EOSINOPHIL NFR BLD AUTO: 2.4 % — SIGNIFICANT CHANGE UP (ref 0–6)
GLUCOSE SERPL-MCNC: 96 MG/DL — SIGNIFICANT CHANGE UP (ref 70–99)
HCT VFR BLD CALC: 41.5 % — SIGNIFICANT CHANGE UP (ref 39–50)
HGB BLD-MCNC: 13.1 G/DL — SIGNIFICANT CHANGE UP (ref 13–17)
IMM GRANULOCYTES NFR BLD AUTO: 5.9 % — HIGH (ref 0–1.5)
INR BLD: 1.04 — SIGNIFICANT CHANGE UP (ref 0.88–1.17)
LYMPHOCYTES # BLD AUTO: 0.73 K/UL — LOW (ref 1–3.3)
LYMPHOCYTES # BLD AUTO: 5.4 % — LOW (ref 13–44)
MAGNESIUM SERPL-MCNC: 2.2 MG/DL — SIGNIFICANT CHANGE UP (ref 1.6–2.6)
MCHC RBC-ENTMCNC: 25.8 PG — LOW (ref 27–34)
MCHC RBC-ENTMCNC: 31.6 % — LOW (ref 32–36)
MCV RBC AUTO: 81.9 FL — SIGNIFICANT CHANGE UP (ref 80–100)
MONOCYTES # BLD AUTO: 1.07 K/UL — HIGH (ref 0–0.9)
MONOCYTES NFR BLD AUTO: 7.9 % — SIGNIFICANT CHANGE UP (ref 2–14)
NEUTROPHILS # BLD AUTO: 10.51 K/UL — HIGH (ref 1.8–7.4)
NEUTROPHILS NFR BLD AUTO: 77.4 % — HIGH (ref 43–77)
NRBC # FLD: 0.02 K/UL — SIGNIFICANT CHANGE UP (ref 0–0)
ORGANISM # SPEC MICROSCOPIC CNT: SIGNIFICANT CHANGE UP
PHOSPHATE SERPL-MCNC: 2.6 MG/DL — SIGNIFICANT CHANGE UP (ref 2.5–4.5)
PLATELET # BLD AUTO: 75 K/UL — LOW (ref 150–400)
PMV BLD: 11.4 FL — SIGNIFICANT CHANGE UP (ref 7–13)
POTASSIUM SERPL-MCNC: 3.9 MMOL/L — SIGNIFICANT CHANGE UP (ref 3.5–5.3)
POTASSIUM SERPL-SCNC: 3.9 MMOL/L — SIGNIFICANT CHANGE UP (ref 3.5–5.3)
PROT SERPL-MCNC: 6.2 G/DL — SIGNIFICANT CHANGE UP (ref 6–8.3)
PROTHROM AB SERPL-ACNC: 11.9 SEC — SIGNIFICANT CHANGE UP (ref 9.8–13.1)
RBC # BLD: 5.07 M/UL — SIGNIFICANT CHANGE UP (ref 4.2–5.8)
RBC # FLD: 16.6 % — HIGH (ref 10.3–14.5)
SODIUM SERPL-SCNC: 142 MMOL/L — SIGNIFICANT CHANGE UP (ref 135–145)
SPECIMEN SOURCE: SIGNIFICANT CHANGE UP
WBC # BLD: 13.57 K/UL — HIGH (ref 3.8–10.5)
WBC # FLD AUTO: 13.57 K/UL — HIGH (ref 3.8–10.5)

## 2019-11-04 PROCEDURE — 99233 SBSQ HOSP IP/OBS HIGH 50: CPT

## 2019-11-04 RX ORDER — ACETAMINOPHEN 500 MG
650 TABLET ORAL ONCE
Refills: 0 | Status: COMPLETED | OUTPATIENT
Start: 2019-11-04 | End: 2019-11-04

## 2019-11-04 RX ORDER — PANTOPRAZOLE SODIUM 20 MG/1
40 TABLET, DELAYED RELEASE ORAL
Refills: 0 | Status: DISCONTINUED | OUTPATIENT
Start: 2019-11-04 | End: 2019-11-07

## 2019-11-04 RX ORDER — POLYETHYLENE GLYCOL 3350 17 G/17G
17 POWDER, FOR SOLUTION ORAL DAILY
Refills: 0 | Status: DISCONTINUED | OUTPATIENT
Start: 2019-11-04 | End: 2019-11-06

## 2019-11-04 RX ADMIN — PANTOPRAZOLE SODIUM 40 MILLIGRAM(S): 20 TABLET, DELAYED RELEASE ORAL at 11:17

## 2019-11-04 RX ADMIN — Medication 3 MILLIGRAM(S): at 00:07

## 2019-11-04 RX ADMIN — PANTOPRAZOLE SODIUM 40 MILLIGRAM(S): 20 TABLET, DELAYED RELEASE ORAL at 17:25

## 2019-11-04 RX ADMIN — Medication 3 MILLILITER(S): at 07:34

## 2019-11-04 RX ADMIN — CHLORHEXIDINE GLUCONATE 1 APPLICATION(S): 213 SOLUTION TOPICAL at 11:18

## 2019-11-04 RX ADMIN — POLYETHYLENE GLYCOL 3350 17 GRAM(S): 17 POWDER, FOR SOLUTION ORAL at 17:25

## 2019-11-04 RX ADMIN — Medication 650 MILLIGRAM(S): at 03:39

## 2019-11-04 RX ADMIN — SIMETHICONE 80 MILLIGRAM(S): 80 TABLET, CHEWABLE ORAL at 18:04

## 2019-11-04 RX ADMIN — Medication 650 MILLIGRAM(S): at 04:49

## 2019-11-04 RX ADMIN — CEFEPIME 100 MILLIGRAM(S): 1 INJECTION, POWDER, FOR SOLUTION INTRAMUSCULAR; INTRAVENOUS at 03:39

## 2019-11-04 NOTE — PROGRESS NOTE ADULT - PROBLEM SELECTOR PLAN 8
Transitions of Care Status:  1.  Name of PCP: Thad Ewing [phone]  2.  PCP Contacted on Admission: [ ] Y    [ ] N N/A patient transferred from O[facility], initially admitted to MICU   3.  PCP contacted at Discharge: [ ] Y    [ ] N    [ ] N/A  4.  Post-Discharge Appointment Date and Location:  5.  Summary of Handoff given to PCP:

## 2019-11-04 NOTE — PROGRESS NOTE ADULT - PROBLEM SELECTOR PLAN 1
- resolved  - Hgb remains stable; cont to trend daily  - Protonix 40mg PO BID  - avoid nsaids   - simethicone q6h for gas discomfort   - continue to monitor stool color and keep stools soft with miralax and suppositories prn; pt occasionally requires enema's   - no acute role for endoscopic evaluation   - will cont to follow and monitor closely for further episodes

## 2019-11-04 NOTE — PROGRESS NOTE ADULT - ASSESSMENT
75 yo M w/ CAD s/p stent, HTN, HLD, hypothyroidism, congenital atrophic R kidney, transferred from [facility], [facility] w/ R hydroureteronephrosis, likely 2/2 distal ureteral stones, septic shock with E.coli bacteremia, now s/p IR perc nephrostomy tube on 11/1.  Weaned off pressors, now transferred to floor.

## 2019-11-04 NOTE — PROGRESS NOTE ADULT - PROBLEM SELECTOR PLAN 2
- right hydroureteronephrosis seen on CT scan  - s/p R nephrostomy tube  - c/b HALEY, Cr rising, renal dose medications, avoid nephrotoxins  - continue to monitor renal function, UOP, output from nephrostomy tube  - per uro no need for intervention for ureteral stone until infection clears

## 2019-11-04 NOTE — PROGRESS NOTE ADULT - SUBJECTIVE AND OBJECTIVE BOX
[facility] Division of Hospital Medicine  Heber Kirkpatrick MD  Pager [phone]    Patient is a 74y old  Male who presents with a chief complaint of urosepsis (04 Nov 2019 11:24)      SUBJECTIVE / OVERNIGHT EVENTS:  ADDITIONAL REVIEW OF SYSTEMS:    MEDICATIONS  (STANDING):  cefepime   IVPB 1000 milliGRAM(s) IV Intermittent every 24 hours  chlorhexidine 4% Liquid 1 Application(s) Topical daily  pantoprazole    Tablet 40 milliGRAM(s) Oral two times a day  polyethylene glycol 3350 17 Gram(s) Oral daily    MEDICATIONS  (PRN):  albuterol/ipratropium for Nebulization. 3 milliLiter(s) Nebulizer every 4 hours PRN Wheezing  bisacodyl Suppository 10 milliGRAM(s) Rectal daily PRN Constipation  melatonin 3 milliGRAM(s) Oral at bedtime PRN Insomnia  simethicone 80 milliGRAM(s) Chew four times a day PRN Gas      CAPILLARY BLOOD GLUCOSE        I&O's Summary    03 Nov 2019 07:01  -  04 Nov 2019 07:00  --------------------------------------------------------  IN: 625 mL / OUT: 2455 mL / NET: -1830 mL        PHYSICAL EXAM:  Vital Signs Last 24 Hrs  T(C): 36.8 (04 Nov 2019 12:26), Max: 37.1 (04 Nov 2019 04:25)  T(F): 98.2 (04 Nov 2019 12:26), Max: 98.7 (04 Nov 2019 04:25)  HR: 80 (04 Nov 2019 12:26) (72 - 80)  BP: 144/78 (04 Nov 2019 12:26) (144/78 - 147/95)  BP(mean): --  RR: 20 (04 Nov 2019 12:26) (16 - 20)  SpO2: 100% (04 Nov 2019 12:26) (91% - 100%)  CONSTITUTIONAL:   EYES:   ENMT:   NECK:   RESPIRATORY:   CARDIOVASCULAR:   ABDOMEN:   MUSCULOSKELETAL:   PSYCH:   NEUROLOGY:   SKIN:    LABS:                        13.1   13.57 )-----------( 75       ( 04 Nov 2019 06:08 )             41.5     11-04    142  |  110<H>  |  42<H>  ----------------------------<  96  3.9   |  19<L>  |  3.35<H>    Ca    9.5      04 Nov 2019 06:08  Phos  2.6     11-04  Mg     2.2     11-04    TPro  6.2  /  Alb  2.9<L>  /  TBili  1.5<H>  /  DBili  x   /  AST  27  /  ALT  24  /  AlkPhos  204<H>  11-04    PT/INR - ( 04 Nov 2019 06:08 )   PT: 11.9 SEC;   INR: 1.04          PTT - ( 04 Nov 2019 06:08 )  PTT:27.3 SEC          Culture - Blood (collected 02 Nov 2019 04:20)  Source: BLOOD PERIPHERAL  Preliminary Report (04 Nov 2019 04:21):    NO ORGANISMS ISOLATED    NO ORGANISMS ISOLATED AT 48 HRS.    Culture - Blood (collected 02 Nov 2019 04:20)  Source: BLOOD VENOUS  Preliminary Report (03 Nov 2019 09:42):    ***Blood Panel PCR results on this specimen are available    approximately 3 hours after the Gram stain result***    Gram stain, PCR, and/or culture results may not always    correspond due to difference in methodologies    ------------------------------------------------------------    This PCR assay was performed using [employer].  The    following targets are tested for:  Enterococcus, vancomycin    resistant enterococci, Listeria monocytogenes,  coagulase    negative staphylococci, S. aureus, methicillin resistant S.    aureus, Streptococcus agalactiae (Group B), S. pneumoniae,    S. pyogenes (Group A), Acinetobacter baumannii, Enterobacter    cloacae, E. coli, Klebsiella oxytoca, K. pneumoniae, Proteus    sp., Serratia marcescens, Haemophilus influenzae, Neisseria    meningitidis, Pseudomonas aeruginosa, Candida albicans, C.    glabrata, C. krusei, C. parapsilosis, C. tropicalis and the    KPC resistance gene.    **NOTE: Due to technical problems, Proteus sp. will NOT be    reported as part of the BCID paneluntil further notice.  Final Report (04 Nov 2019 10:34):    Single set isolate, possible contaminant.    Contact microbiology if susceptibility testing is clinically    indicated.  Organism: BLOOD CULTURE PCR  Staphylococcus sp.,coag neg (04 Nov 2019 10:34)  Organism: Staphylococcus sp.,coag neg (04 Nov 2019 10:34)  Organism: BLOOD CULTURE PCR  ***Blood Panel PCR results on this specimen are available  approximately 3 hours after the Gram stain result***  Gram stain, PCR, and/or culture results may not always  correspond due to difference in methodologies  ------------------------------------------------------------  This PCR assay was performed using [employer].  The  following targets are tested for:  Enterococcus, vancomycin  resistant enterococci, Listeria monocytogenes,  coagulase  negative staphylococci, S. aureus, methicillin resistant S.  aureus, Streptococcus agalactiae (Group B), S. pneumoniae,  S. pyogenes (Group A), Acinetobacter baumannii, Enterobacter  cloacae, E. coli, Klebsiella oxytoca, K. pneumoniae, Proteus  sp., Serratia marcescens, Haemophilus influenzae, Neisseria  meningitidis, Pseudomonas aeruginosa, Candida albicans, C.  glabrata, C. krusei, C. parapsilosis, C. tropicalis and the  KPC resistance gene.  **NOTE: Due to technical problems, Proteus sp. will NOT be  reported as part of the BCID panel until further notice. (04 Nov 2019 10:34)    Culture - Urine (collected 01 Nov 2019 20:10)  Source: URINE  Final Report (03 Nov 2019 12:18):    COLONY COUNT: > = 100,000 CFU/ML  Organism: Escherichia coli (03 Nov 2019 12:18)  Organism: Escherichia coli (03 Nov 2019 12:18)        RADIOLOGY & ADDITIONAL TESTS:  Results Reviewed:   Imaging Personally Reviewed:  Electrocardiogram Personally Reviewed:    COORDINATION OF CARE:  Care Discussed with Consultants/Other Providers [Y/N]:  Prior or Outpatient Records Reviewed [Y/N]: [facility] Division of Hospital Medicine  Heber Kirkpatrick MD  Pager [phone]    Patient is a 74y old  Male who presents with a chief complaint of urosepsis (04 Nov 2019 11:24)    SUBJECTIVE / OVERNIGHT EVENTS:  Sitting in chair, eating vegetarian meal. Denies fever/chills/bleeding/bruising, does note feeling weak and becoming easily tired with any activity. Having difficulty getting help to go to the bathroom in time, advised patient to "call, don't fall" and to discuss with nursing staff how to meet his toileting needs.     MEDICATIONS  (STANDING):  cefepime   IVPB 1000 milliGRAM(s) IV Intermittent every 24 hours  chlorhexidine 4% Liquid 1 Application(s) Topical daily  pantoprazole    Tablet 40 milliGRAM(s) Oral two times a day  polyethylene glycol 3350 17 Gram(s) Oral daily    MEDICATIONS  (PRN):  albuterol/ipratropium for Nebulization. 3 milliLiter(s) Nebulizer every 4 hours PRN Wheezing  bisacodyl Suppository 10 milliGRAM(s) Rectal daily PRN Constipation  melatonin 3 milliGRAM(s) Oral at bedtime PRN Insomnia  simethicone 80 milliGRAM(s) Chew four times a day PRN Gas    CAPILLARY BLOOD GLUCOSE    I&O's Summary    03 Nov 2019 07:01  -  04 Nov 2019 07:00  --------------------------------------------------------  IN: 625 mL / OUT: 2455 mL / NET: -1830 mL    PHYSICAL EXAM:  Vital Signs Last 24 Hrs  T(C): 36.8 (04 Nov 2019 12:26), Max: 37.1 (04 Nov 2019 04:25)  T(F): 98.2 (04 Nov 2019 12:26), Max: 98.7 (04 Nov 2019 04:25)  HR: 80 (04 Nov 2019 12:26) (72 - 80)  BP: 144/78 (04 Nov 2019 12:26) (144/78 - 147/95)  BP(mean): --  RR: 20 (04 Nov 2019 12:26) (16 - 20)  SpO2: 100% (04 Nov 2019 12:26) (91% - 100%)    CONSTITUTIONAL: NAD, sitting in chair  EYES: EOMI, clear sclera/conjunctiva  ENMT: MMM  NECK: soft, R neck site of former line c/d/i  RESPIRATORY: CTAB, comfortable on RA  CARDIOVASCULAR: S1S2 RRR  ABDOMEN: soft, non-tender  MUSCULOSKELETAL: no c/c/e  PSYCH: calm  NEUROLOGY: moving all extremities, non-focal  SKIN: +R nephrostomy tube draining pink fluid  : +leslie    LABS:                        13.1   13.57 )-----------( 75       ( 04 Nov 2019 06:08 )             41.5     11-04    142  |  110<H>  |  42<H>  ----------------------------<  96  3.9   |  19<L>  |  3.35<H>    Ca    9.5      04 Nov 2019 06:08  Phos  2.6     11-04  Mg     2.2     11-04    TPro  6.2  /  Alb  2.9<L>  /  TBili  1.5<H>  /  DBili  x   /  AST  27  /  ALT  24  /  AlkPhos  204<H>  11-04    PT/INR - ( 04 Nov 2019 06:08 )   PT: 11.9 SEC;   INR: 1.04     PTT - ( 04 Nov 2019 06:08 )  PTT:27.3 SEC    Culture - Blood (collected 02 Nov 2019 04:20)  Source: BLOOD PERIPHERAL  Preliminary Report (04 Nov 2019 04:21):    NO ORGANISMS ISOLATED    NO ORGANISMS ISOLATED AT 48 HRS.    Culture - Blood (collected 02 Nov 2019 04:20)  Source: BLOOD VENOUS  Preliminary Report (03 Nov 2019 09:42):    ***Blood Panel PCR results on this specimen are available    approximately 3 hours after the Gram stain result***    Gram stain, PCR, and/or culture results may not always    correspond due to difference in methodologies    ------------------------------------------------------------    This PCR assay was performed using [employer].  The    following targets are tested for:  Enterococcus, vancomycin    resistant enterococci, Listeria monocytogenes,  coagulase    negative staphylococci, S. aureus, methicillin resistant S.    aureus, Streptococcus agalactiae (Group B), S. pneumoniae,    S. pyogenes (Group A), Acinetobacter baumannii, Enterobacter    cloacae, E. coli, Klebsiella oxytoca, K. pneumoniae, Proteus    sp., Serratia marcescens, Haemophilus influenzae, Neisseria    meningitidis, Pseudomonas aeruginosa, Candida albicans, C.    glabrata, C. krusei, C. parapsilosis, C. tropicalis and the    KPC resistance gene.    **NOTE: Due to technical problems, Proteus sp. will NOT be    reported as part of the BCID paneluntil further notice.  Final Report (04 Nov 2019 10:34):    Single set isolate, possible contaminant.    Contact microbiology if susceptibility testing is clinically    indicated.  Organism: BLOOD CULTURE PCR  Staphylococcus sp.,coag neg (04 Nov 2019 10:34)  Organism: Staphylococcus sp.,coag neg (04 Nov 2019 10:34)  Organism: BLOOD CULTURE PCR  ***Blood Panel PCR results on this specimen are available  approximately 3 hours after the Gram stain result***  Gram stain, PCR, and/or culture results may not always  correspond due to difference in methodologies  ------------------------------------------------------------  This PCR assay was performed using [employer].  The  following targets are tested for:  Enterococcus, vancomycin  resistant enterococci, Listeria monocytogenes,  coagulase  negative staphylococci, S. aureus, methicillin resistant S.  aureus, Streptococcus agalactiae (Group B), S. pneumoniae,  S. pyogenes (Group A), Acinetobacter baumannii, Enterobacter  cloacae, E. coli, Klebsiella oxytoca, K. pneumoniae, Proteus  sp., Serratia marcescens, Haemophilus influenzae, Neisseria  meningitidis, Pseudomonas aeruginosa, Candida albicans, C.  glabrata, C. krusei, C. parapsilosis, C. tropicalis and the  KPC resistance gene.  **NOTE: Due to technical problems, Proteus sp. will NOT be  reported as part of the BCID panel until further notice. (04 Nov 2019 10:34)    Culture - Urine (collected 01 Nov 2019 20:10)  Source: URINE  Final Report (03 Nov 2019 12:18):    COLONY COUNT: > = 100,000 CFU/ML  Organism: Escherichia coli (03 Nov 2019 12:18)  Organism: Escherichia coli (03 Nov 2019 12:18)    RADIOLOGY & ADDITIONAL TESTS:  Results Reviewed:   Imaging Personally Reviewed:  Electrocardiogram Personally Reviewed:    COORDINATION OF CARE:  Care Discussed with Consultants/Other Providers [Y]: Medicine BRITT Alvarez  Prior or Outpatient Records Reviewed [Y]: GI, [facility]

## 2019-11-04 NOTE — PROGRESS NOTE ADULT - PROBLEM SELECTOR PLAN 1
- bacteremia (Bcx 10/31 with E.coli), likely urinary source  - Ucx with E.coli  - repeat Bcx 11/2 with coag neg staph  - shock resolved  - still with leukocytosis, but currently afebrile  - c/w abx, eventual transition to oral abx as clinically improves

## 2019-11-04 NOTE — PROGRESS NOTE ADULT - PROBLEM SELECTOR PLAN 2
- R hydronephrosis and urosepsis 2/2 R distal ureteral stones s/p R percutaneous nephrostomy tube  - cont care per primary team

## 2019-11-04 NOTE — PROGRESS NOTE ADULT - PROBLEM SELECTOR PLAN 9
DVT ppx: SCDs for now given thrombocytopenia    Dispo: pending treatment of infection, continued clinical stability, will obtain PT eval given family concerns about deconditioning

## 2019-11-04 NOTE — PROGRESS NOTE ADULT - SUBJECTIVE AND OBJECTIVE BOX
INTERVAL HPI/OVERNIGHT EVENTS:    family bedside   no abdominal complaints  no n/v  denying bloody stools     MEDICATIONS  (STANDING):  cefepime   IVPB 1000 milliGRAM(s) IV Intermittent every 24 hours  chlorhexidine 4% Liquid 1 Application(s) Topical daily  pantoprazole  Injectable 40 milliGRAM(s) IV Push daily    MEDICATIONS  (PRN):  albuterol/ipratropium for Nebulization. 3 milliLiter(s) Nebulizer every 4 hours PRN Wheezing  melatonin 3 milliGRAM(s) Oral at bedtime PRN Insomnia  simethicone 80 milliGRAM(s) Chew four times a day PRN Gas      Allergies    No Known Allergies    Intolerances        Review of Systems:    General:  No wt loss, fevers, chills, night sweats, fatigue   Eyes:  Good vision, no reported pain  ENT:  No sore throat, pain, runny nose, dysphagia  CV:  No pain, palpitations, hypo/hypertension  Resp:  No dyspnea, cough, tachypnea, wheezing  GI:  No pain, No nausea, No vomiting, No diarrhea, No constipation, No weight loss, No fever, No pruritis, No rectal bleeding, No melena, No dysphagia  :  No pain, bleeding, incontinence, nocturia  Muscle:  No pain, weakness  Neuro:  No weakness, tingling, memory problems  Psych:  No fatigue, insomnia, mood problems, depression  Endocrine:  No polyuria, polydypsia, cold/heat intolerance  Heme:  No petechiae, ecchymosis, easy bruisability  Skin:  No rash, tattoos, scars, edema      Vital Signs Last 24 Hrs  T(C): 37.1 (04 Nov 2019 04:25), Max: 37.1 (04 Nov 2019 04:25)  T(F): 98.7 (04 Nov 2019 04:25), Max: 98.7 (04 Nov 2019 04:25)  HR: 72 (04 Nov 2019 07:34) (72 - 80)  BP: 147/95 (04 Nov 2019 04:25) (126/71 - 147/95)  BP(mean): 75 (03 Nov 2019 12:00) (75 - 75)  RR: 18 (04 Nov 2019 05:50) (16 - 20)  SpO2: 95% (04 Nov 2019 07:34) (91% - 100%)    PHYSICAL EXAM:    Constitutional: NAD  HEENT: EOMI, throat clear  Neck: No LAD, supple  Respiratory: CTA and P  Cardiovascular: S1 and S2, RRR, no M  Gastrointestinal: BS+, soft, NT/ND, neg HSM,  Extremities: No peripheral edema, neg clubbing, cyanosis  Vascular: 2+ peripheral pulses  Neurological: A/O x 3, no focal deficits  Psychiatric: Normal mood, normal affect  Skin: No rashes      LABS:                        13.1   13.57 )-----------( 75       ( 04 Nov 2019 06:08 )             41.5     11-04    142  |  110<H>  |  42<H>  ----------------------------<  96  3.9   |  19<L>  |  3.35<H>    Ca    9.5      04 Nov 2019 06:08  Phos  2.6     11-04  Mg     2.2     11-04    TPro  6.2  /  Alb  2.9<L>  /  TBili  1.5<H>  /  DBili  x   /  AST  27  /  ALT  24  /  AlkPhos  204<H>  11-04    PT/INR - ( 04 Nov 2019 06:08 )   PT: 11.9 SEC;   INR: 1.04          PTT - ( 04 Nov 2019 06:08 )  PTT:27.3 SEC      RADIOLOGY & ADDITIONAL TESTS:

## 2019-11-05 ENCOUNTER — TRANSCRIPTION ENCOUNTER (OUTPATIENT)
Age: 74
End: 2019-11-05

## 2019-11-05 DIAGNOSIS — E80.6 OTHER DISORDERS OF BILIRUBIN METABOLISM: ICD-10-CM

## 2019-11-05 LAB
HCT VFR BLD CALC: 36.2 % — LOW (ref 39–50)
HGB BLD-MCNC: 11.2 G/DL — LOW (ref 13–17)
MCHC RBC-ENTMCNC: 25.6 PG — LOW (ref 27–34)
MCHC RBC-ENTMCNC: 30.9 % — LOW (ref 32–36)
MCV RBC AUTO: 82.6 FL — SIGNIFICANT CHANGE UP (ref 80–100)
NRBC # FLD: 0 K/UL — SIGNIFICANT CHANGE UP (ref 0–0)
PLATELET # BLD AUTO: 90 K/UL — LOW (ref 150–400)
PMV BLD: 12.6 FL — SIGNIFICANT CHANGE UP (ref 7–13)
RBC # BLD: 4.38 M/UL — SIGNIFICANT CHANGE UP (ref 4.2–5.8)
RBC # FLD: 16.5 % — HIGH (ref 10.3–14.5)
WBC # BLD: 13.96 K/UL — HIGH (ref 3.8–10.5)
WBC # FLD AUTO: 13.96 K/UL — HIGH (ref 3.8–10.5)

## 2019-11-05 PROCEDURE — 76700 US EXAM ABDOM COMPLETE: CPT | Mod: 26

## 2019-11-05 PROCEDURE — 99233 SBSQ HOSP IP/OBS HIGH 50: CPT

## 2019-11-05 RX ORDER — LEVOTHYROXINE SODIUM 125 MCG
137 TABLET ORAL DAILY
Refills: 0 | Status: DISCONTINUED | OUTPATIENT
Start: 2019-11-05 | End: 2019-11-07

## 2019-11-05 RX ADMIN — CEFEPIME 100 MILLIGRAM(S): 1 INJECTION, POWDER, FOR SOLUTION INTRAMUSCULAR; INTRAVENOUS at 04:31

## 2019-11-05 RX ADMIN — PANTOPRAZOLE SODIUM 40 MILLIGRAM(S): 20 TABLET, DELAYED RELEASE ORAL at 17:43

## 2019-11-05 RX ADMIN — SIMETHICONE 80 MILLIGRAM(S): 80 TABLET, CHEWABLE ORAL at 06:18

## 2019-11-05 RX ADMIN — POLYETHYLENE GLYCOL 3350 17 GRAM(S): 17 POWDER, FOR SOLUTION ORAL at 17:43

## 2019-11-05 RX ADMIN — Medication 3 MILLIGRAM(S): at 20:54

## 2019-11-05 RX ADMIN — PANTOPRAZOLE SODIUM 40 MILLIGRAM(S): 20 TABLET, DELAYED RELEASE ORAL at 06:18

## 2019-11-05 RX ADMIN — SIMETHICONE 80 MILLIGRAM(S): 80 TABLET, CHEWABLE ORAL at 17:43

## 2019-11-05 RX ADMIN — Medication 137 MICROGRAM(S): at 17:44

## 2019-11-05 RX ADMIN — CHLORHEXIDINE GLUCONATE 1 APPLICATION(S): 213 SOLUTION TOPICAL at 17:44

## 2019-11-05 NOTE — PROGRESS NOTE ADULT - ATTENDING COMMENTS
Monitor renal function/UOP, f/u PT eval, eventual plan to transition to PO abx to complete course for bacteremia as patient improves. PMD and patient's daughter updated on hospital course

## 2019-11-05 NOTE — PHYSICAL THERAPY INITIAL EVALUATION ADULT - ADDITIONAL COMMENTS
Pt. owns a rolling walker.     Pt. was left seated in chair post PT Evaluation, no apparent distress, all lines intact, wife present. JOEY Henriquez made aware of pt. status and participation in PT.

## 2019-11-05 NOTE — PROGRESS NOTE ADULT - ASSESSMENT
73 yo M w/ CAD s/p stent, HTN, HLD, hypothyroidism, congenital atrophic R kidney, transferred from [facility] w/ R hydroureteronephrosis, likely 2/2 distal ureteral stones, septic shock with E.coli bacteremia, now s/p IR perc nephrostomy tube on 11/1.  Weaned off pressors, now transferred to floor.

## 2019-11-05 NOTE — PHYSICAL THERAPY INITIAL EVALUATION ADULT - PERTINENT HX OF CURRENT PROBLEM, REHAB EVAL
Per documentation, pt. transferred from [facility] with right hydroureteronephrosis, likely secondary to distal ureteral stones, septic shock with E.coli bacteremia, now s/p IR perc nephrostomy tube on 11/1.

## 2019-11-05 NOTE — PROGRESS NOTE ADULT - PROBLEM SELECTOR PLAN 2
- right hydroureteronephrosis seen on CT scan  - s/p R nephrostomy tube  - c/b HALEY, Cr overall trending up, renal dose medications, avoid nephrotoxins  - continue to monitor renal function, UOP, output from nephrostomy tube  - per uro no need for intervention for ureteral stone until infection clears  - 11/4 Cr 3.35 from 3.52, hopeful Cr will continue to downtrend  - if renal function significantly worsens or with drop in UOP, will f/u urology eval and consider renal eval

## 2019-11-05 NOTE — PHYSICAL THERAPY INITIAL EVALUATION ADULT - GENERAL OBSERVATIONS, REHAB EVAL
Consult received, chart reviewed. Patient received supine in bed, no apparent distress, +leslie, wife present. Patient agreed to Evaluation from Physical Therapist.

## 2019-11-05 NOTE — PROGRESS NOTE ADULT - PROBLEM SELECTOR PLAN 1
- bacteremia (Bcx 10/31 with E.coli), likely urinary source  - Ucx with E.coli  - repeat Bcx 11/2 with coag neg staph  - shock resolved  - still with leukocytosis, but currently afebrile  - c/w abx, eventual transition to oral abx as clinically improves (E.coli appears pan-sensitive based on culture results)

## 2019-11-05 NOTE — PROGRESS NOTE ADULT - SUBJECTIVE AND OBJECTIVE BOX
INTERVAL HPI/OVERNIGHT EVENTS:    doing well   minimal abd complaints  no n/v  passing flatus; had bm "i think yesterday"; nonbloody   tolerating po intake    MEDICATIONS  (STANDING):  cefepime   IVPB 1000 milliGRAM(s) IV Intermittent every 24 hours  chlorhexidine 4% Liquid 1 Application(s) Topical daily  pantoprazole    Tablet 40 milliGRAM(s) Oral two times a day  polyethylene glycol 3350 17 Gram(s) Oral daily    MEDICATIONS  (PRN):  albuterol/ipratropium for Nebulization. 3 milliLiter(s) Nebulizer every 4 hours PRN Wheezing  bisacodyl Suppository 10 milliGRAM(s) Rectal daily PRN Constipation  melatonin 3 milliGRAM(s) Oral at bedtime PRN Insomnia  simethicone 80 milliGRAM(s) Chew four times a day PRN Gas      Allergies    No Known Allergies    Intolerances        Review of Systems:    General:  No wt loss, fevers, chills, night sweats, fatigue   Eyes:  Good vision, no reported pain  ENT:  No sore throat, pain, runny nose, dysphagia  CV:  No pain, palpitations, hypo/hypertension  Resp:  No dyspnea, cough, tachypnea, wheezing  GI:  No pain, No nausea, No vomiting, No diarrhea, No constipation, No weight loss, No fever, No pruritis, No rectal bleeding, No melena, No dysphagia  :  No pain, bleeding, incontinence, nocturia  Muscle:  No pain, weakness  Neuro:  No weakness, tingling, memory problems  Psych:  No fatigue, insomnia, mood problems, depression  Endocrine:  No polyuria, polydypsia, cold/heat intolerance  Heme:  No petechiae, ecchymosis, easy bruisability  Skin:  No rash, tattoos, scars, edema      Vital Signs Last 24 Hrs  T(C): 36.9 (05 Nov 2019 05:00), Max: 36.9 (05 Nov 2019 05:00)  T(F): 98.4 (05 Nov 2019 05:00), Max: 98.4 (05 Nov 2019 05:00)  HR: 84 (05 Nov 2019 05:00) (73 - 84)  BP: 118/89 (05 Nov 2019 05:00) (118/89 - 144/78)  BP(mean): --  RR: 18 (05 Nov 2019 05:00) (18 - 20)  SpO2: 95% (05 Nov 2019 05:00) (95% - 100%)    PHYSICAL EXAM:    Constitutional: NAD  HEENT: EOMI, throat clear  Neck: No LAD, supple  Respiratory: CTA and P  Cardiovascular: S1 and S2, RRR, no M  Gastrointestinal: BS+, soft, NT/ND, neg HSM,  Extremities: No peripheral edema, neg clubbing, cyanosis  Vascular: 2+ peripheral pulses  Neurological: A/O x 3, no focal deficits  Psychiatric: Normal mood, normal affect  Skin: No rashes      LABS:                        11.2   13.96 )-----------( 90       ( 05 Nov 2019 04:40 )             36.2     11-04    142  |  110<H>  |  42<H>  ----------------------------<  96  3.9   |  19<L>  |  3.35<H>    Ca    9.5      04 Nov 2019 06:08  Phos  2.6     11-04  Mg     2.2     11-04    TPro  6.2  /  Alb  2.9<L>  /  TBili  1.5<H>  /  DBili  x   /  AST  27  /  ALT  24  /  AlkPhos  204<H>  11-04    PT/INR - ( 04 Nov 2019 06:08 )   PT: 11.9 SEC;   INR: 1.04          PTT - ( 04 Nov 2019 06:08 )  PTT:27.3 SEC      RADIOLOGY & ADDITIONAL TESTS:

## 2019-11-05 NOTE — PROGRESS NOTE ADULT - SUBJECTIVE AND OBJECTIVE BOX
[facility] Division of Hospital Medicine  Heber Kirkpatrick MD  Pager [phone]    Patient is a 74y old  Male who presents with a chief complaint of urosepsis (04 Nov 2019 13:55)      SUBJECTIVE / OVERNIGHT EVENTS:  ADDITIONAL REVIEW OF SYSTEMS:    MEDICATIONS  (STANDING):  cefepime   IVPB 1000 milliGRAM(s) IV Intermittent every 24 hours  chlorhexidine 4% Liquid 1 Application(s) Topical daily  pantoprazole    Tablet 40 milliGRAM(s) Oral two times a day  polyethylene glycol 3350 17 Gram(s) Oral daily    MEDICATIONS  (PRN):  albuterol/ipratropium for Nebulization. 3 milliLiter(s) Nebulizer every 4 hours PRN Wheezing  bisacodyl Suppository 10 milliGRAM(s) Rectal daily PRN Constipation  melatonin 3 milliGRAM(s) Oral at bedtime PRN Insomnia  simethicone 80 milliGRAM(s) Chew four times a day PRN Gas      CAPILLARY BLOOD GLUCOSE        I&O's Summary    04 Nov 2019 07:01  -  05 Nov 2019 07:00  --------------------------------------------------------  IN: 1690 mL / OUT: 2325 mL / NET: -635 mL        PHYSICAL EXAM:  Vital Signs Last 24 Hrs  T(C): 36.9 (05 Nov 2019 05:00), Max: 36.9 (05 Nov 2019 05:00)  T(F): 98.4 (05 Nov 2019 05:00), Max: 98.4 (05 Nov 2019 05:00)  HR: 84 (05 Nov 2019 05:00) (73 - 84)  BP: 118/89 (05 Nov 2019 05:00) (118/89 - 144/78)  BP(mean): --  RR: 18 (05 Nov 2019 05:00) (18 - 20)  SpO2: 95% (05 Nov 2019 05:00) (95% - 100%)  CONSTITUTIONAL:   EYES:   ENMT:   NECK:   RESPIRATORY:   CARDIOVASCULAR:   ABDOMEN:   MUSCULOSKELETAL:   PSYCH:   NEUROLOGY:   SKIN:    LABS:                        11.2   13.96 )-----------( 90       ( 05 Nov 2019 04:40 )             36.2     11-04    142  |  110<H>  |  42<H>  ----------------------------<  96  3.9   |  19<L>  |  3.35<H>    Ca    9.5      04 Nov 2019 06:08  Phos  2.6     11-04  Mg     2.2     11-04    TPro  6.2  /  Alb  2.9<L>  /  TBili  1.5<H>  /  DBili  x   /  AST  27  /  ALT  24  /  AlkPhos  204<H>  11-04    PT/INR - ( 04 Nov 2019 06:08 )   PT: 11.9 SEC;   INR: 1.04          PTT - ( 04 Nov 2019 06:08 )  PTT:27.3 SEC            RADIOLOGY & ADDITIONAL TESTS:  Results Reviewed:   Imaging Personally Reviewed:  Electrocardiogram Personally Reviewed:    COORDINATION OF CARE:  Care Discussed with Consultants/Other Providers [Y/N]:  Prior or Outpatient Records Reviewed [Y/N]: [facility]  Heber Kirkpatrick MD  Pager [phone]    Patient is a 74y old  Male who presents with a chief complaint of urosepsis (04 Nov 2019 13:55)    SUBJECTIVE / OVERNIGHT EVENTS:  Overall feels well, denies fever/chills/abdominal pain/nausea/vomiting, no CP or SOB. Noted to be snoring at night, per wife does this at home as well, but denies excessive daytime sleepiness, dry mouth or headache in AM, no apneic episodes noted by wife at home while patient sleeping.     Daughter Ana ([phone]) updated as well.     MEDICATIONS  (STANDING):  cefepime   IVPB 1000 milliGRAM(s) IV Intermittent every 24 hours  chlorhexidine 4% Liquid 1 Application(s) Topical daily  pantoprazole    Tablet 40 milliGRAM(s) Oral two times a day  polyethylene glycol 3350 17 Gram(s) Oral daily    MEDICATIONS  (PRN):  albuterol/ipratropium for Nebulization. 3 milliLiter(s) Nebulizer every 4 hours PRN Wheezing  bisacodyl Suppository 10 milliGRAM(s) Rectal daily PRN Constipation  melatonin 3 milliGRAM(s) Oral at bedtime PRN Insomnia  simethicone 80 milliGRAM(s) Chew four times a day PRN Gas    CAPILLARY BLOOD GLUCOSE    I&O's Summary    04 Nov 2019 07:01  -  05 Nov 2019 07:00  --------------------------------------------------------  IN: 1690 mL / OUT: 2325 mL / NET: -635 mL    PHYSICAL EXAM:  Vital Signs Last 24 Hrs  T(C): 36.9 (05 Nov 2019 05:00), Max: 36.9 (05 Nov 2019 05:00)  T(F): 98.4 (05 Nov 2019 05:00), Max: 98.4 (05 Nov 2019 05:00)  HR: 84 (05 Nov 2019 05:00) (73 - 84)  BP: 118/89 (05 Nov 2019 05:00) (118/89 - 144/78)  BP(mean): --  RR: 18 (05 Nov 2019 05:00) (18 - 20)  SpO2: 95% (05 Nov 2019 05:00) (95% - 100%)    CONSTITUTIONAL: NAD, lying in bed  EYES: EOMI, clear sclera/conjunctiva  ENMT: MMM  NECK: supple  RESPIRATORY: CTAB, comfortable on RA   CARDIOVASCULAR: S1S2 RRR  ABDOMEN: soft, non-tender, +R nephrostomy tube on back  MUSCULOSKELETAL: no c/c/e  PSYCH: calm   NEUROLOGY: non-focal, moving all extremities   : leslie in place    LABS:                        11.2   13.96 )-----------( 90       ( 05 Nov 2019 04:40 )             36.2     11-04    142  |  110<H>  |  42<H>  ----------------------------<  96  3.9   |  19<L>  |  3.35<H>    Ca    9.5      04 Nov 2019 06:08  Phos  2.6     11-04  Mg     2.2     11-04    TPro  6.2  /  Alb  2.9<L>  /  TBili  1.5<H>  /  DBili  x   /  AST  27  /  ALT  24  /  AlkPhos  204<H>  11-04    PT/INR - ( 04 Nov 2019 06:08 )   PT: 11.9 SEC;   INR: 1.04     PTT - ( 04 Nov 2019 06:08 )  PTT:27.3 SEC    RADIOLOGY & ADDITIONAL TESTS:  Results Reviewed:   < from: US Abdomen Complete (11.05.19 @ 13:28) >  FINDINGS:  Liver: Within normal limits.  Bile ducts: Normal caliber. Common bile duct measures 4 mm.   Gallbladder: Cholecystectomy.      Pancreas: Visualized portions are within normal limits.  Spleen: 11.6 x 6.3 x 5.7 cm. Within normal limits.  Right kidney:5.2 x 3.1 x 2.4 cm. No hydronephrosis. Upper pole cyst,   measuring 1.8 x 1.4 cm.  Left kidne 11.4 x 6.7 x 6.1 cm. No hydronephrosis. Lower pole cyst,   measuring 2.1 x 1.8 x 1.5 cm.     AsciteNone.ne.  Aorta and IVVisualized portions are within normal limits.ts.  Other: Urinary bladder contains a Leslie catheter.    IMPRESSION:   No biliary ductal dilatation.   No hydronephrosis.    < end of copied text >    Imaging Personally Reviewed:  Electrocardiogram Personally Reviewed:    COORDINATION OF CARE:  Care Discussed with Consultants/Other Providers [Y]: Medicine BRITT Espinal, PMD Dr. Ewing (PMD, phone number is [phone]), updated on  hospital course  Prior or Outpatient Records Reviewed [Y]: GI

## 2019-11-05 NOTE — PROGRESS NOTE ADULT - PROBLEM SELECTOR PLAN 8
Transitions of Care Status:  1.  Name of PCP: Thad Ewing [phone] <- this number is incorrect, PMD's actual number is [phone]  2.  PCP Contacted on Admission: [ ] Y    [ ] N N/A patient transferred from [facility], initially admitted to MICU, Spoke with PMD Dr. Ewing on 11/5   3.  PCP contacted at Discharge: [ ] Y    [ ] N    [ ] N/A  4.  Post-Discharge Appointment Date and Location:  5.  Summary of Handoff given to PCP: Updated PMD on transfer to [facility], patient admitted with septic shock 2/2 E.coli bacteremia (likely from urinary source), setting of distal ureteral stones also causing severe R hydronephrosis and obstructive uropathy, currently pending continued clinical improvement and PT eval

## 2019-11-06 DIAGNOSIS — K59.01 SLOW TRANSIT CONSTIPATION: ICD-10-CM

## 2019-11-06 LAB
ANION GAP SERPL CALC-SCNC: 12 MMO/L — SIGNIFICANT CHANGE UP (ref 7–14)
ANISOCYTOSIS BLD QL: SLIGHT — SIGNIFICANT CHANGE UP
BASOPHILS # BLD AUTO: 0.11 K/UL — SIGNIFICANT CHANGE UP (ref 0–0.2)
BASOPHILS NFR BLD AUTO: 0.8 % — SIGNIFICANT CHANGE UP (ref 0–2)
BASOPHILS NFR SPEC: 0.9 % — SIGNIFICANT CHANGE UP (ref 0–2)
BLASTS # FLD: 0 % — SIGNIFICANT CHANGE UP (ref 0–0)
BUN SERPL-MCNC: 37 MG/DL — HIGH (ref 7–23)
CALCIUM SERPL-MCNC: 9.2 MG/DL — SIGNIFICANT CHANGE UP (ref 8.4–10.5)
CHLORIDE SERPL-SCNC: 104 MMOL/L — SIGNIFICANT CHANGE UP (ref 98–107)
CO2 SERPL-SCNC: 21 MMOL/L — LOW (ref 22–31)
CREAT SERPL-MCNC: 2.79 MG/DL — HIGH (ref 0.5–1.3)
EOSINOPHIL # BLD AUTO: 0.31 K/UL — SIGNIFICANT CHANGE UP (ref 0–0.5)
EOSINOPHIL NFR BLD AUTO: 2.2 % — SIGNIFICANT CHANGE UP (ref 0–6)
EOSINOPHIL NFR FLD: 0.9 % — SIGNIFICANT CHANGE UP (ref 0–6)
GIANT PLATELETS BLD QL SMEAR: PRESENT — SIGNIFICANT CHANGE UP
GLUCOSE SERPL-MCNC: 100 MG/DL — HIGH (ref 70–99)
HAV IGM SER-ACNC: NONREACTIVE — SIGNIFICANT CHANGE UP
HBV CORE IGM SER-ACNC: NONREACTIVE — SIGNIFICANT CHANGE UP
HBV SURFACE AG SER-ACNC: NONREACTIVE — SIGNIFICANT CHANGE UP
HCT VFR BLD CALC: 40 % — SIGNIFICANT CHANGE UP (ref 39–50)
HCV AB S/CO SERPL IA: 0.16 S/CO — SIGNIFICANT CHANGE UP (ref 0–0.99)
HCV AB SERPL-IMP: SIGNIFICANT CHANGE UP
HGB BLD-MCNC: 11.9 G/DL — LOW (ref 13–17)
IMM GRANULOCYTES NFR BLD AUTO: 10.2 % — HIGH (ref 0–1.5)
LYMPHOCYTES # BLD AUTO: 1.14 K/UL — SIGNIFICANT CHANGE UP (ref 1–3.3)
LYMPHOCYTES # BLD AUTO: 8.1 % — LOW (ref 13–44)
LYMPHOCYTES NFR SPEC AUTO: 5.5 % — LOW (ref 13–44)
MACROCYTES BLD QL: SLIGHT — SIGNIFICANT CHANGE UP
MAGNESIUM SERPL-MCNC: 2.1 MG/DL — SIGNIFICANT CHANGE UP (ref 1.6–2.6)
MCHC RBC-ENTMCNC: 25.3 PG — LOW (ref 27–34)
MCHC RBC-ENTMCNC: 29.8 % — LOW (ref 32–36)
MCV RBC AUTO: 84.9 FL — SIGNIFICANT CHANGE UP (ref 80–100)
METAMYELOCYTES # FLD: 0 % — SIGNIFICANT CHANGE UP (ref 0–1)
MONOCYTES # BLD AUTO: 1.62 K/UL — HIGH (ref 0–0.9)
MONOCYTES NFR BLD AUTO: 11.5 % — SIGNIFICANT CHANGE UP (ref 2–14)
MONOCYTES NFR BLD: 15.5 % — HIGH (ref 2–9)
MYELOCYTES NFR BLD: 1.8 % — HIGH (ref 0–0)
NEUTROPHIL AB SER-ACNC: 67.3 % — SIGNIFICANT CHANGE UP (ref 43–77)
NEUTROPHILS # BLD AUTO: 9.45 K/UL — HIGH (ref 1.8–7.4)
NEUTROPHILS NFR BLD AUTO: 67.2 % — SIGNIFICANT CHANGE UP (ref 43–77)
NEUTS BAND # BLD: 3.6 % — SIGNIFICANT CHANGE UP (ref 0–6)
NRBC # FLD: 0 K/UL — SIGNIFICANT CHANGE UP (ref 0–0)
OTHER - HEMATOLOGY %: 0 — SIGNIFICANT CHANGE UP
OVALOCYTES BLD QL SMEAR: SIGNIFICANT CHANGE UP
PHOSPHATE SERPL-MCNC: 2.3 MG/DL — LOW (ref 2.5–4.5)
PLATELET # BLD AUTO: 128 K/UL — LOW (ref 150–400)
PLATELET COUNT - ESTIMATE: SIGNIFICANT CHANGE UP
PMV BLD: 12.3 FL — SIGNIFICANT CHANGE UP (ref 7–13)
POIKILOCYTOSIS BLD QL AUTO: SIGNIFICANT CHANGE UP
POLYCHROMASIA BLD QL SMEAR: SIGNIFICANT CHANGE UP
POTASSIUM SERPL-MCNC: 4.2 MMOL/L — SIGNIFICANT CHANGE UP (ref 3.5–5.3)
POTASSIUM SERPL-SCNC: 4.2 MMOL/L — SIGNIFICANT CHANGE UP (ref 3.5–5.3)
PROMYELOCYTES # FLD: 0 % — SIGNIFICANT CHANGE UP (ref 0–0)
RBC # BLD: 4.71 M/UL — SIGNIFICANT CHANGE UP (ref 4.2–5.8)
RBC # FLD: 16.7 % — HIGH (ref 10.3–14.5)
SMUDGE CELLS # BLD: PRESENT — SIGNIFICANT CHANGE UP
SODIUM SERPL-SCNC: 137 MMOL/L — SIGNIFICANT CHANGE UP (ref 135–145)
T4 FREE SERPL-MCNC: 0.43 NG/DL — LOW (ref 0.9–1.8)
TSH SERPL-MCNC: 51.08 UIU/ML — HIGH (ref 0.27–4.2)
VARIANT LYMPHS # BLD: 4.5 % — SIGNIFICANT CHANGE UP
WBC # BLD: 14.06 K/UL — HIGH (ref 3.8–10.5)
WBC # FLD AUTO: 14.06 K/UL — HIGH (ref 3.8–10.5)

## 2019-11-06 PROCEDURE — 99233 SBSQ HOSP IP/OBS HIGH 50: CPT

## 2019-11-06 RX ORDER — ONDANSETRON 8 MG/1
4 TABLET, FILM COATED ORAL ONCE
Refills: 0 | Status: COMPLETED | OUTPATIENT
Start: 2019-11-06 | End: 2019-11-06

## 2019-11-06 RX ORDER — POLYETHYLENE GLYCOL 3350 17 G/17G
17 POWDER, FOR SOLUTION ORAL
Refills: 0 | Status: DISCONTINUED | OUTPATIENT
Start: 2019-11-06 | End: 2019-11-07

## 2019-11-06 RX ORDER — ACETAMINOPHEN 500 MG
650 TABLET ORAL EVERY 6 HOURS
Refills: 0 | Status: DISCONTINUED | OUTPATIENT
Start: 2019-11-06 | End: 2019-11-07

## 2019-11-06 RX ORDER — MINERAL OIL
133 OIL (ML) MISCELLANEOUS ONCE
Refills: 0 | Status: COMPLETED | OUTPATIENT
Start: 2019-11-06 | End: 2019-11-06

## 2019-11-06 RX ORDER — PHENAZOPYRIDINE HCL 100 MG
100 TABLET ORAL ONCE
Refills: 0 | Status: COMPLETED | OUTPATIENT
Start: 2019-11-06 | End: 2019-11-06

## 2019-11-06 RX ORDER — SODIUM,POTASSIUM PHOSPHATES 278-250MG
1 POWDER IN PACKET (EA) ORAL
Refills: 0 | Status: DISCONTINUED | OUTPATIENT
Start: 2019-11-06 | End: 2019-11-07

## 2019-11-06 RX ADMIN — Medication 100 MILLIGRAM(S): at 02:52

## 2019-11-06 RX ADMIN — Medication 650 MILLIGRAM(S): at 23:52

## 2019-11-06 RX ADMIN — PANTOPRAZOLE SODIUM 40 MILLIGRAM(S): 20 TABLET, DELAYED RELEASE ORAL at 06:27

## 2019-11-06 RX ADMIN — Medication 1 TABLET(S): at 18:10

## 2019-11-06 RX ADMIN — Medication 133 MILLILITER(S): at 11:36

## 2019-11-06 RX ADMIN — CHLORHEXIDINE GLUCONATE 1 APPLICATION(S): 213 SOLUTION TOPICAL at 11:09

## 2019-11-06 RX ADMIN — ONDANSETRON 4 MILLIGRAM(S): 8 TABLET, FILM COATED ORAL at 08:34

## 2019-11-06 RX ADMIN — Medication 650 MILLIGRAM(S): at 11:09

## 2019-11-06 RX ADMIN — CEFEPIME 100 MILLIGRAM(S): 1 INJECTION, POWDER, FOR SOLUTION INTRAMUSCULAR; INTRAVENOUS at 02:59

## 2019-11-06 RX ADMIN — Medication 3 MILLIGRAM(S): at 22:09

## 2019-11-06 RX ADMIN — POLYETHYLENE GLYCOL 3350 17 GRAM(S): 17 POWDER, FOR SOLUTION ORAL at 11:09

## 2019-11-06 RX ADMIN — Medication 650 MILLIGRAM(S): at 12:09

## 2019-11-06 RX ADMIN — Medication 137 MICROGRAM(S): at 06:27

## 2019-11-06 RX ADMIN — Medication 1 TABLET(S): at 22:09

## 2019-11-06 RX ADMIN — POLYETHYLENE GLYCOL 3350 17 GRAM(S): 17 POWDER, FOR SOLUTION ORAL at 18:10

## 2019-11-06 RX ADMIN — PANTOPRAZOLE SODIUM 40 MILLIGRAM(S): 20 TABLET, DELAYED RELEASE ORAL at 18:10

## 2019-11-06 NOTE — PROGRESS NOTE ADULT - ASSESSMENT
73 yo M w/ CAD s/p stent, HTN, HLD, hypothyroidism, congenital atrophic R kidney, transferred from [facility] w/ R hydroureteronephrosis, likely 2/2 distal ureteral stones, septic shock with E.coli bacteremia, now s/p IR perc nephrostomy tube on 11/1.  Weaned off pressors, now transferred to floor. Currently afebrile, Cr improving.

## 2019-11-06 NOTE — PROGRESS NOTE ADULT - SUBJECTIVE AND OBJECTIVE BOX
[facility]  Heber Kirkpatrick MD  Pager [phone]    Patient is a 74y old  Male who presents with a chief complaint of urosepsis (06 Nov 2019 11:15)      SUBJECTIVE / OVERNIGHT EVENTS:  ADDITIONAL REVIEW OF SYSTEMS:    MEDICATIONS  (STANDING):  cefepime   IVPB 1000 milliGRAM(s) IV Intermittent every 24 hours  chlorhexidine 4% Liquid 1 Application(s) Topical daily  levothyroxine 137 MICROGram(s) Oral daily  mineral oil enema 133 milliLiter(s) Rectal once  pantoprazole    Tablet 40 milliGRAM(s) Oral two times a day  polyethylene glycol 3350 17 Gram(s) Oral two times a day    MEDICATIONS  (PRN):  acetaminophen   Tablet .. 650 milliGRAM(s) Oral every 6 hours PRN Mild Pain (1 - 3), Moderate Pain (4 - 6)  albuterol/ipratropium for Nebulization. 3 milliLiter(s) Nebulizer every 4 hours PRN Wheezing  bisacodyl Suppository 10 milliGRAM(s) Rectal daily PRN Constipation  melatonin 3 milliGRAM(s) Oral at bedtime PRN Insomnia  simethicone 80 milliGRAM(s) Chew four times a day PRN Gas      CAPILLARY BLOOD GLUCOSE        I&O's Summary    05 Nov 2019 07:01  -  06 Nov 2019 07:00  --------------------------------------------------------  IN: 1060 mL / OUT: 2040 mL / NET: -980 mL    06 Nov 2019 07:01  -  06 Nov 2019 11:21  --------------------------------------------------------  IN: 0 mL / OUT: 1000 mL / NET: -1000 mL        PHYSICAL EXAM:  Vital Signs Last 24 Hrs  T(C): 36.8 (06 Nov 2019 04:37), Max: 36.8 (06 Nov 2019 04:37)  T(F): 98.3 (06 Nov 2019 04:37), Max: 98.3 (06 Nov 2019 04:37)  HR: 71 (06 Nov 2019 04:37) (69 - 80)  BP: 121/69 (06 Nov 2019 04:37) (121/69 - 155/78)  BP(mean): --  RR: 18 (06 Nov 2019 04:37) (18 - 18)  SpO2: 95% (06 Nov 2019 04:37) (95% - 99%)  CONSTITUTIONAL:   EYES:   ENMT:   NECK:   RESPIRATORY:   CARDIOVASCULAR:   ABDOMEN:   MUSCULOSKELETAL:   PSYCH:   NEUROLOGY:   SKIN:    LABS:                        11.9   14.06 )-----------( 128      ( 06 Nov 2019 05:40 )             40.0     11-06    137  |  104  |  37<H>  ----------------------------<  100<H>  4.2   |  21<L>  |  2.79<H>    Ca    9.2      06 Nov 2019 05:40  Phos  2.3     11-06  Mg     2.1     11-06                  RADIOLOGY & ADDITIONAL TESTS:  Results Reviewed:   Imaging Personally Reviewed:  Electrocardiogram Personally Reviewed:    COORDINATION OF CARE:  Care Discussed with Consultants/Other Providers [Y/N]:  Prior or Outpatient Records Reviewed [Y/N]: [facility] Division of Hospital Medicine  Heber Kirkpatrick MD  Pager [phone]    Patient is a 74y old  Male who presents with a chief complaint of urosepsis (06 Nov 2019 11:15)      SUBJECTIVE / OVERNIGHT EVENTS:  Patient denies fever/chills/abdominal pain, is eating well, and passing gas. Only complaint is some discomfort from the leslie. Requesting provider update daughter. Happy to hear Cr is downtrending. PT recommending otpt PT.     MEDICATIONS  (STANDING):  cefepime   IVPB 1000 milliGRAM(s) IV Intermittent every 24 hours  chlorhexidine 4% Liquid 1 Application(s) Topical daily  levothyroxine 137 MICROGram(s) Oral daily  mineral oil enema 133 milliLiter(s) Rectal once  pantoprazole    Tablet 40 milliGRAM(s) Oral two times a day  polyethylene glycol 3350 17 Gram(s) Oral two times a day    MEDICATIONS  (PRN):  acetaminophen   Tablet .. 650 milliGRAM(s) Oral every 6 hours PRN Mild Pain (1 - 3), Moderate Pain (4 - 6)  albuterol/ipratropium for Nebulization. 3 milliLiter(s) Nebulizer every 4 hours PRN Wheezing  bisacodyl Suppository 10 milliGRAM(s) Rectal daily PRN Constipation  melatonin 3 milliGRAM(s) Oral at bedtime PRN Insomnia  simethicone 80 milliGRAM(s) Chew four times a day PRN Gas      CAPILLARY BLOOD GLUCOSE    I&O's Summary    05 Nov 2019 07:01  -  06 Nov 2019 07:00  --------------------------------------------------------  IN: 1060 mL / OUT: 2040 mL / NET: -980 mL    06 Nov 2019 07:01  -  06 Nov 2019 11:21  --------------------------------------------------------  IN: 0 mL / OUT: 1000 mL / NET: -1000 mL        PHYSICAL EXAM:  Vital Signs Last 24 Hrs  T(C): 36.8 (06 Nov 2019 04:37), Max: 36.8 (06 Nov 2019 04:37)  T(F): 98.3 (06 Nov 2019 04:37), Max: 98.3 (06 Nov 2019 04:37)  HR: 71 (06 Nov 2019 04:37) (69 - 80)  BP: 121/69 (06 Nov 2019 04:37) (121/69 - 155/78)  BP(mean): --  RR: 18 (06 Nov 2019 04:37) (18 - 18)  SpO2: 95% (06 Nov 2019 04:37) (95% - 99%)    CONSTITUTIONAL: NAD, lying in bed  EYES: EOMI, clear sclera/conjunctiva  ENMT: MMM  NECK: supple  RESPIRATORY: CTAB, comfortable on RA   CARDIOVASCULAR: S1S2 RRR  ABDOMEN: soft, non-tender, R sided nephrostomy tube in place  MUSCULOSKELETAL: no c/c/e  PSYCH: calm, AOx3  NEUROLOGY: non-focal, moving all extremities  : leslie in place    LABS:                        11.9   14.06 )-----------( 128      ( 06 Nov 2019 05:40 )             40.0     11-06    137  |  104  |  37<H>  ----------------------------<  100<H>  4.2   |  21<L>  |  2.79<H>    Ca    9.2      06 Nov 2019 05:40  Phos  2.3     11-06  Mg     2.1     11-06      RADIOLOGY & ADDITIONAL TESTS:  Results Reviewed:   Imaging Personally Reviewed:  Electrocardiogram Personally Reviewed:    COORDINATION OF CARE:  Care Discussed with Consultants/Other Providers [Y]: Medicine ACP Kim, pharmacist re: renal dosing of levaquin for bacteremia  Prior or Outpatient Records Reviewed [Y]: GI note

## 2019-11-06 NOTE — PROGRESS NOTE ADULT - SUBJECTIVE AND OBJECTIVE BOX
INTERVAL HPI/OVERNIGHT EVENTS:    c/o bloating and constipation; no n/v  last productive bm was 3 days ago   tolerating po intake     MEDICATIONS  (STANDING):  cefepime   IVPB 1000 milliGRAM(s) IV Intermittent every 24 hours  chlorhexidine 4% Liquid 1 Application(s) Topical daily  levothyroxine 137 MICROGram(s) Oral daily  pantoprazole    Tablet 40 milliGRAM(s) Oral two times a day  polyethylene glycol 3350 17 Gram(s) Oral daily    MEDICATIONS  (PRN):  acetaminophen   Tablet .. 650 milliGRAM(s) Oral every 6 hours PRN Mild Pain (1 - 3), Moderate Pain (4 - 6)  albuterol/ipratropium for Nebulization. 3 milliLiter(s) Nebulizer every 4 hours PRN Wheezing  bisacodyl Suppository 10 milliGRAM(s) Rectal daily PRN Constipation  melatonin 3 milliGRAM(s) Oral at bedtime PRN Insomnia  simethicone 80 milliGRAM(s) Chew four times a day PRN Gas      Allergies    No Known Allergies    Intolerances        Review of Systems:    General:  No wt loss, fevers, chills, night sweats, fatigue   Eyes:  Good vision, no reported pain  ENT:  No sore throat, pain, runny nose, dysphagia  CV:  No pain, palpitations, hypo/hypertension  Resp:  No dyspnea, cough, tachypnea, wheezing  GI:  No pain, No nausea, No vomiting, No diarrhea, +constipation, No weight loss, No fever, No pruritis, No rectal bleeding, No melena, No dysphagia  :  No pain, bleeding, incontinence, nocturia  Muscle:  No pain, weakness  Neuro:  No weakness, tingling, memory problems  Psych:  No fatigue, insomnia, mood problems, depression  Endocrine:  No polyuria, polydypsia, cold/heat intolerance  Heme:  No petechiae, ecchymosis, easy bruisability  Skin:  No rash, tattoos, scars, edema      Vital Signs Last 24 Hrs  T(C): 36.8 (06 Nov 2019 04:37), Max: 36.8 (06 Nov 2019 04:37)  T(F): 98.3 (06 Nov 2019 04:37), Max: 98.3 (06 Nov 2019 04:37)  HR: 71 (06 Nov 2019 04:37) (69 - 80)  BP: 121/69 (06 Nov 2019 04:37) (121/69 - 155/78)  BP(mean): --  RR: 18 (06 Nov 2019 04:37) (18 - 18)  SpO2: 95% (06 Nov 2019 04:37) (95% - 99%)    PHYSICAL EXAM:    Constitutional: NAD  HEENT: EOMI, throat clear  Neck: No LAD, supple  Respiratory: CTA and P  Cardiovascular: S1 and S2, RRR, no M  Gastrointestinal: BS+, soft, NT/ND, neg HSM,  Extremities: No peripheral edema, neg clubbing, cyanosis  Vascular: 2+ peripheral pulses  Neurological: A/O x 3, no focal deficits  Psychiatric: Normal mood, normal affect  Skin: No rashes      LABS:                        11.9   14.06 )-----------( 128      ( 06 Nov 2019 05:40 )             40.0     11-06    137  |  104  |  37<H>  ----------------------------<  100<H>  4.2   |  21<L>  |  2.79<H>    Ca    9.2      06 Nov 2019 05:40  Phos  2.3     11-06  Mg     2.1     11-06            RADIOLOGY & ADDITIONAL TESTS:

## 2019-11-06 NOTE — PROGRESS NOTE ADULT - PROBLEM SELECTOR PLAN 9
DVT ppx: SCDs for now given thrombocytopenia    Dispo: pending treatment of infection, will transition to PO abx today, PT recs home with otpt PT

## 2019-11-06 NOTE — PROGRESS NOTE ADULT - ATTENDING COMMENTS
Monitor renal function/UOP, transition to renally dosed PO abx (levaquin) to complete 14 day course of antibiotics for bacteremia, PT recs home w/ otpt PT, discussed with patient and daughter, possibly d/c soon (ie before weekend) if continues clinical stability/improvement. Will need otpt urology followup, likely will also need home services re: nephrostomy care

## 2019-11-06 NOTE — PROGRESS NOTE ADULT - PROBLEM SELECTOR PLAN 8
Transitions of Care Status:  1.  Name of PCP: Thad Ewing [phone] <- this number is incorrect, PMD's actual number is [phone]  2.  PCP Contacted on Admission: [ ] Y    [ ] N N/A patient transferred from [facility], initially admitted to MICU, Spoke with PMD Dr. Ewing on 11/5   3.  PCP contacted at Discharge: [ ] Y    [ ] N    [ ] N/A  4.  Post-Discharge Appointment Date and Location:  5.  Summary of Handoff given to PCP: Updated PMD on transfer to [facility], patient admitted with septic shock 2/2 E.coli bacteremia (likely from urinary source), setting of distal ureteral stones also causing severe R hydronephrosis and obstructive uropathy, currently pending continued clinical improvement and PT eval

## 2019-11-06 NOTE — PROGRESS NOTE ADULT - PROBLEM SELECTOR PLAN 1
- E.coli bacteremia (Bcx 10/31 with E.coli), likely urinary source  - Ucx with E.coli  - repeat Bcx 11/2 with coag neg staph  - shock resolved  - still with leukocytosis, but currently afebrile  - E.coli appears pan-sensitive based on culture results  - will transition patient to renally dosed levaquin (appreciate pharmacy assistance with renal dosing, qtc was 430 ms on EKG 10/31), 500 mg x1, followed by 250 mg daily to complete 14 day course of abx for bacteremia  - d/w daughter [phone], in agreement with above plan re: transition to abx

## 2019-11-06 NOTE — PROGRESS NOTE ADULT - PROBLEM SELECTOR PLAN 2
- right hydroureteronephrosis seen on CT scan  - s/p R nephrostomy tube, also has leslie in place  - c/b HALEY, renal dose medications, avoid nephrotoxins  - continue to monitor renal function, UOP, output from nephrostomy tube  - per uro no need for intervention for ureteral stone until infection clears  - Cr improving today to 2.79 (from 3.35), hopeful for continued improvement  - if renal function significantly worsens or with drop in UOP, will f/u urology eval and consider renal eval

## 2019-11-07 ENCOUNTER — TRANSCRIPTION ENCOUNTER (OUTPATIENT)
Age: 74
End: 2019-11-07

## 2019-11-07 VITALS
OXYGEN SATURATION: 100 % | DIASTOLIC BLOOD PRESSURE: 72 MMHG | RESPIRATION RATE: 18 BRPM | SYSTOLIC BLOOD PRESSURE: 125 MMHG | HEART RATE: 75 BPM | TEMPERATURE: 98 F

## 2019-11-07 DIAGNOSIS — N13.30 UNSPECIFIED HYDRONEPHROSIS: ICD-10-CM

## 2019-11-07 LAB
ANION GAP SERPL CALC-SCNC: 11 MMO/L — SIGNIFICANT CHANGE UP (ref 7–14)
BACTERIA BLD CULT: SIGNIFICANT CHANGE UP
BUN SERPL-MCNC: 32 MG/DL — HIGH (ref 7–23)
CALCIUM SERPL-MCNC: 9 MG/DL — SIGNIFICANT CHANGE UP (ref 8.4–10.5)
CHLORIDE SERPL-SCNC: 106 MMOL/L — SIGNIFICANT CHANGE UP (ref 98–107)
CO2 SERPL-SCNC: 23 MMOL/L — SIGNIFICANT CHANGE UP (ref 22–31)
CREAT SERPL-MCNC: 2.6 MG/DL — HIGH (ref 0.5–1.3)
GLUCOSE SERPL-MCNC: 102 MG/DL — HIGH (ref 70–99)
HCT VFR BLD CALC: 36.6 % — LOW (ref 39–50)
HGB BLD-MCNC: 11.1 G/DL — LOW (ref 13–17)
MCHC RBC-ENTMCNC: 25.5 PG — LOW (ref 27–34)
MCHC RBC-ENTMCNC: 30.3 % — LOW (ref 32–36)
MCV RBC AUTO: 83.9 FL — SIGNIFICANT CHANGE UP (ref 80–100)
NRBC # FLD: 0 K/UL — SIGNIFICANT CHANGE UP (ref 0–0)
PLATELET # BLD AUTO: 151 K/UL — SIGNIFICANT CHANGE UP (ref 150–400)
PMV BLD: 12.1 FL — SIGNIFICANT CHANGE UP (ref 7–13)
POTASSIUM SERPL-MCNC: 4.7 MMOL/L — SIGNIFICANT CHANGE UP (ref 3.5–5.3)
POTASSIUM SERPL-SCNC: 4.7 MMOL/L — SIGNIFICANT CHANGE UP (ref 3.5–5.3)
RBC # BLD: 4.36 M/UL — SIGNIFICANT CHANGE UP (ref 4.2–5.8)
RBC # FLD: 16.8 % — HIGH (ref 10.3–14.5)
SODIUM SERPL-SCNC: 140 MMOL/L — SIGNIFICANT CHANGE UP (ref 135–145)
WBC # BLD: 13.16 K/UL — HIGH (ref 3.8–10.5)
WBC # FLD AUTO: 13.16 K/UL — HIGH (ref 3.8–10.5)

## 2019-11-07 PROCEDURE — 99239 HOSP IP/OBS DSCHRG MGMT >30: CPT

## 2019-11-07 RX ORDER — ASPIRIN/CALCIUM CARB/MAGNESIUM 324 MG
1 TABLET ORAL
Qty: 0 | Refills: 0 | DISCHARGE

## 2019-11-07 RX ADMIN — Medication 3 MILLILITER(S): at 07:02

## 2019-11-07 RX ADMIN — Medication 137 MICROGRAM(S): at 05:48

## 2019-11-07 RX ADMIN — Medication 1 TABLET(S): at 13:52

## 2019-11-07 RX ADMIN — POLYETHYLENE GLYCOL 3350 17 GRAM(S): 17 POWDER, FOR SOLUTION ORAL at 05:48

## 2019-11-07 RX ADMIN — Medication 650 MILLIGRAM(S): at 00:30

## 2019-11-07 RX ADMIN — PANTOPRAZOLE SODIUM 40 MILLIGRAM(S): 20 TABLET, DELAYED RELEASE ORAL at 05:48

## 2019-11-07 RX ADMIN — CHLORHEXIDINE GLUCONATE 1 APPLICATION(S): 213 SOLUTION TOPICAL at 13:52

## 2019-11-07 RX ADMIN — SIMETHICONE 80 MILLIGRAM(S): 80 TABLET, CHEWABLE ORAL at 02:58

## 2019-11-07 NOTE — PROGRESS NOTE ADULT - PROBLEM SELECTOR PLAN 3
- Advanced care planning was discussed with patient and family.  Advanced care planning forms were reviewed and discussed.  Risks, benefits and alternatives of gastroenterologic procedures were discussed in detail and all questions were answered.  30 minutes spent.
- R hydronephrosis and urosepsis 2/2 R distal ureteral stones s/p R percutaneous nephrostomy tube  - cont care per primary team
- ?setting of infection  - continue to monitor, improving (151k) today
- ?setting of infection  - continue to monitor, bit better today (75k from 68k)
- ?setting of infection  - continue to monitor, bit better today (90k from 75k)
- ?setting of infection  - continue to monitor, improving (128k from 90k)

## 2019-11-07 NOTE — PROGRESS NOTE ADULT - REASON FOR ADMISSION
urosepsis

## 2019-11-07 NOTE — PROGRESS NOTE ADULT - SUBJECTIVE AND OBJECTIVE BOX
INTERVAL HPI/OVERNIGHT EVENTS:    wife bedside  (+) large brown bm this morning and yesterday afternoon   no abd pain   no n/v    MEDICATIONS  (STANDING):  chlorhexidine 4% Liquid 1 Application(s) Topical daily  levoFLOXacin  Tablet 250 milliGRAM(s) Oral every 24 hours  levothyroxine 137 MICROGram(s) Oral daily  pantoprazole    Tablet 40 milliGRAM(s) Oral two times a day  polyethylene glycol 3350 17 Gram(s) Oral two times a day  potassium acid phosphate/sodium acid phosphate tablet (K-PHOS No. 2) 1 Tablet(s) Oral four times a day with meals    MEDICATIONS  (PRN):  acetaminophen   Tablet .. 650 milliGRAM(s) Oral every 6 hours PRN Mild Pain (1 - 3), Moderate Pain (4 - 6)  albuterol/ipratropium for Nebulization. 3 milliLiter(s) Nebulizer every 4 hours PRN Wheezing  bisacodyl Suppository 10 milliGRAM(s) Rectal daily PRN Constipation  melatonin 3 milliGRAM(s) Oral at bedtime PRN Insomnia  simethicone 80 milliGRAM(s) Chew four times a day PRN Gas      Allergies    No Known Allergies    Intolerances        Review of Systems:    General:  No wt loss, fevers, chills, night sweats, fatigue   Eyes:  Good vision, no reported pain  ENT:  No sore throat, pain, runny nose, dysphagia  CV:  No pain, palpitations, hypo/hypertension  Resp:  No dyspnea, cough, tachypnea, wheezing  GI:  No pain, No nausea, No vomiting, No diarrhea, No constipation, No weight loss, No fever, No pruritis, No rectal bleeding, No melena, No dysphagia  :  No pain, bleeding, incontinence, nocturia  Muscle:  No pain, weakness  Neuro:  No weakness, tingling, memory problems  Psych:  No fatigue, insomnia, mood problems, depression  Endocrine:  No polyuria, polydypsia, cold/heat intolerance  Heme:  No petechiae, ecchymosis, easy bruisability  Skin:  No rash, tattoos, scars, edema      Vital Signs Last 24 Hrs  T(C): 36.6 (07 Nov 2019 04:25), Max: 36.8 (06 Nov 2019 12:29)  T(F): 97.8 (07 Nov 2019 04:25), Max: 98.2 (06 Nov 2019 12:29)  HR: 75 (07 Nov 2019 07:02) (72 - 95)  BP: 116/89 (07 Nov 2019 04:25) (116/89 - 139/78)  BP(mean): --  RR: 16 (07 Nov 2019 04:25) (16 - 18)  SpO2: 96% (07 Nov 2019 07:02) (96% - 100%)    PHYSICAL EXAM:    Constitutional: NAD  HEENT: EOMI, throat clear  Neck: No LAD, supple  Respiratory: CTA and P  Cardiovascular: S1 and S2, RRR, no M  Gastrointestinal: BS+, soft, NT/ND, neg HSM,  Extremities: No peripheral edema, neg clubbing, cyanosis  Vascular: 2+ peripheral pulses  Neurological: A/O x 3, no focal deficits  Psychiatric: Normal mood, normal affect  Skin: No rashes      LABS:                        11.1   13.16 )-----------( 151      ( 07 Nov 2019 05:09 )             36.6     11-07    140  |  106  |  32<H>  ----------------------------<  102<H>  4.7   |  23  |  2.60<H>    Ca    9.0      07 Nov 2019 05:09  Phos  2.3     11-06  Mg     2.1     11-06            RADIOLOGY & ADDITIONAL TESTS:

## 2019-11-07 NOTE — DISCHARGE NOTE PROVIDER - NSDCFUSCHEDAPPT_GEN_ALL_CORE_FT
MARIN GARCIA ; 11/12/2019 ; NPP Med [address]  MARIN GACRIA ; 11/20/2019 ; [facility]PP [facility]rology [address] [facility]  MARIN GARCIA ; 12/03/2019 ; NPP Cardio [address] [facility] MARIN GARCIA ; 11/12/2019 ; NPP Med [address]  MARIN GARCIA ; 11/20/2019 ; NPP Urology [address] [facility]  MARIN GARCIA ; 12/03/2019 ; NPP Cardio [address] [facility] MARIN GARCIA ; 11/12/2019 ; NPP Med [address]  MARIN GARCIA ; 11/20/2019 ; NPP Urology [address] [facility]  MARIN GARCIA ; 12/03/2019 ; NPP Cardio [address] [facility] MARIN GARCIA ; 11/12/2019 ; NPP Med [address]  MARIN GARCAI ; 11/20/2019 ; [facility]rology [address] [facility]  MARIN GARCIA ; 12/03/2019 ; NPP Cardio [address] [facility]

## 2019-11-07 NOTE — PROGRESS NOTE ADULT - PROBLEM SELECTOR PLAN 7
- medication list obtained from patient/wife, is as follows:   amlodipine 10mg, omeprazole 20 mg, levothyroxine 137 mcg, lipitor 20 mg, Toprol ER 25 mg, flomax .4mg  - will hold off on resuming BP meds at this time, will continue to monitor
- need to clarify home regimen

## 2019-11-07 NOTE — DISCHARGE NOTE PROVIDER - CARE PROVIDER_API CALL
Brian Cruz (MD)  Urology  [address] [place] [address]  Phone: [phone]  Fax: [phone]  Follow Up Time:

## 2019-11-07 NOTE — PROGRESS NOTE ADULT - PROBLEM SELECTOR PLAN 6
- currently denies CP  - will hold off on resuming metoprolol at this time given BP and recent MICU stay for septic shock  - continue to monitor BP
- currently denies CP  - confirm home regimen, f/u GI re: restarting antiplatelet agents
- currently denies CP  - will hold off on resuming metoprolol at this time given BP and recent MICU stay for septic shock  - continue to monitor BP
- currently denies CP  - will hold off on resuming metoprolol at this time given BP and recent MICU stay for septic shock  - continue to monitor BP

## 2019-11-07 NOTE — PROGRESS NOTE ADULT - PROBLEM SELECTOR PROBLEM 3
ACP (advance care planning)
Hydronephrosis with infection
Thrombocytopenia

## 2019-11-07 NOTE — PROGRESS NOTE ADULT - PROBLEM SELECTOR PLAN 4
- Advanced care planning was discussed with patient and family.  Advanced care planning forms were reviewed and discussed.  Risks, benefits and alternatives of gastroenterologic procedures were discussed in detail and all questions were answered.  30 minutes spent.
- GI eval appreciated, c/w ppi bid, no nsaids  - monitor for melena/bloody BMs  - no acute role for endoscopic evaluation at this time

## 2019-11-07 NOTE — DISCHARGE NOTE PROVIDER - NSDCCPCAREPLAN_GEN_ALL_CORE_FT
PRINCIPAL DISCHARGE DIAGNOSIS  Diagnosis: Sepsis secondary to UTI  Assessment and Plan of Treatment: You were treated with cefepime for 6days, You were now transitioned to oral medication Levaquin which you will take for 7 more days. You have a follow up appointment with urologist Dr. Cruz  on Wednesday, November 20th, at 1pm. At that time it will be determined if your tube and leslie catheter can come out.      SECONDARY DISCHARGE DIAGNOSES  Diagnosis: Thrombocytopenia  Assessment and Plan of Treatment: Resolved. Platelets are now within normal range.    Diagnosis: Hydronephrosis, right  Assessment and Plan of Treatment: status post right nephrostomy tube. Kidney function is improving. PRINCIPAL DISCHARGE DIAGNOSIS  Diagnosis: Sepsis secondary to UTI  Assessment and Plan of Treatment: You were treated with cefepime for 6days, You were now transitioned to oral medication Levaquin which you will take for 7 more days. You have a follow up appointment with urologist Dr. Cruz  on Wednesday, November 20th, at 1pm. At that time it will be determined if your tube and leslie catheter can come out.      SECONDARY DISCHARGE DIAGNOSES  Diagnosis: Medication management  Assessment and Plan of Treatment: Your blood pressure medications were held over here. Please follow up with your PCP regarding resuming amlodipine and metoprolol.    Diagnosis: Thrombocytopenia  Assessment and Plan of Treatment: Resolved. Platelets are now within normal range.    Diagnosis: Hydronephrosis, right  Assessment and Plan of Treatment: status post right nephrostomy tube. Kidney function is improving. PRINCIPAL DISCHARGE DIAGNOSIS  Diagnosis: Septic shock due to Escherichia coli  Assessment and Plan of Treatment: You had E.coli bacteremia in your blood stream. You were treated with cefepime for 6days, You were now transitioned to oral medication Levaquin which you will take for 7 more days. You have a follow up appointment with urologist Dr. Cruz  on Wednesday, November 20th, at 1pm. At that time it will be determined if your tube and leslie catheter can come out.      SECONDARY DISCHARGE DIAGNOSES  Diagnosis: Sepsis secondary to UTI  Assessment and Plan of Treatment: The likely source of the bacteria was from the urinary system (you also had E.coli growing in your urine). Continue antibiotics as above.    Diagnosis: Obstructive uropathy  Assessment and Plan of Treatment: You has a blockage in the outflow of urine due to ureteral stones. You had a nephrostomy tube placed in your Right kidney to relieve the pressure. Your kidney function is improving. Please followup with your primary doctor and your nephrologist.    Diagnosis: Medication management  Assessment and Plan of Treatment: Your blood pressure medications were held over here. Please follow up with your PCP regarding resuming amlodipine and metoprolol.    Diagnosis: Thrombocytopenia  Assessment and Plan of Treatment: Resolved. Platelets are now within normal range.    Diagnosis: Hydronephrosis, right  Assessment and Plan of Treatment: status post right nephrostomy tube. Kidney function is improving.

## 2019-11-07 NOTE — PROGRESS NOTE ADULT - PROBLEM SELECTOR PLAN 9
DVT ppx: SCDs for now   Dispo: pending treatment of infection, transitioned to PO abx today, PT recs home with otpt PT DVT ppx: SCDs for now   Dispo:  PT recs home with otpt PT

## 2019-11-07 NOTE — PROGRESS NOTE ADULT - PROBLEM SELECTOR PLAN 5
- has not received synthroid while in the MICU  - dose confirmed with patient and wife  - restarted on synthroid 137 mcg  - noted with high tsh and low t4, will need to repeat TFTs as otpt in 6-8 weeks
- has not received synthroid  - will need to confirm home regimen  - f/u TSH, free T4
- has not received synthroid while in the MICU  - dose confirmed with patient and wife  - restarted on synthroid 137 mcg  - f/u TSH, free T4
- has not received synthroid while in the MICU  - dose confirmed with patient and wife  - restarted on synthroid 137 mcg  - noted with high tsh and low t4, will need to repeat TFTs as otpt in 6-8 weeks

## 2019-11-07 NOTE — PROGRESS NOTE ADULT - PROBLEM SELECTOR PLAN 2
- right hydroureteronephrosis seen on CT scan  - s/p R nephrostomy tube, also has leslie in place  - c/b HALEY, renal dose medications, avoid nephrotoxins  - continue to monitor renal function, UOP, output from nephrostomy tube  - per uro no need for intervention for ureteral stone until infection clears  - Cr improving today to 2.60 (from peak of 3.52 on 11/3), hopeful for continued improvement  - patient should followup with urology as outpatient - right hydroureteronephrosis seen on CT scan  - s/p R nephrostomy tube, also has leslie in place  - c/b HALEY, renal dose medications, avoid nephrotoxins  - continue to monitor renal function, UOP, output from nephrostomy tube  - per uro no need for intervention for ureteral stone until infection clears  - Cr improving today to 2.60 (from peak of 3.52 on 11/3), hopeful for continued improvement  - patient should followup with urology as outpatient (appointment made for Nov 20th 150pm with his urologist in [place]), will leave hospital with leslie and nephrostomy tube in place, patient/wife/daughter in agreement

## 2019-11-07 NOTE — PROGRESS NOTE ADULT - ASSESSMENT
73 yo M w/ CAD s/p stent, HTN, HLD, hypothyroidism, congenital atrophic R kidney, transferred from [facility] w/ R hydroureteronephrosis, likely 2/2 distal ureteral stones, septic shock with E.coli bacteremia, now s/p IR perc nephrostomy tube on 11/1.  Weaned off pressors, now transferred to floor. Currently afebrile, Cr improving.

## 2019-11-07 NOTE — PROGRESS NOTE ADULT - PROBLEM SELECTOR PLAN 8
Transitions of Care Status:  1.  Name of PCP: Thad Ewing [phone] <- this number is incorrect, PMD's actual number is [phone]  2.  PCP Contacted on Admission: [ ] Y    [ ] N N/A patient transferred from [facility], initially admitted to MICU, Spoke with PMD Dr. Ewing on 11/5   3.  PCP contacted at Discharge: [ ] Y    [ ] N    [ ] N/A  4.  Post-Discharge Appointment Date and Location:  5.  Summary of Handoff given to PCP: Updated PMD on transfer to [facility], patient admitted with septic shock 2/2 E.coli bacteremia (likely from urinary source), setting of distal ureteral stones also causing severe R hydronephrosis and obstructive uropathy, currently pending continued clinical improvement and PT eval Transitions of Care Status:  1.  Name of PCP: Thad Ewing [phone] <- this number is incorrect, PMD's actual number is [phone]  2.  PCP Contacted on Admission: [ ] Y    [ ] N N/A patient transferred from [facility], initially admitted to MICU, Spoke with PMD Dr. Ewing on 11/5   3.  PCP contacted at Discharge: [x ] Y    [ ] N    [ ] N/A Updated Dr. Ewing on 11/7   4.  Post-Discharge Appointment Date and Location:  5.  Summary of Handoff given to PCP: Updated PMD on transfer to [facility], patient admitted with septic shock 2/2 E.coli bacteremia (likely from urinary source), setting of distal ureteral stones also causing severe R hydronephrosis and obstructive uropathy, 11/7 d/w Dr. Ewing that patient transitioned to PO levaquin to complete total of 14 day course of abx, renal fxn improving, has urology appointment scheduled on Nov 20th

## 2019-11-07 NOTE — PROGRESS NOTE ADULT - SUBJECTIVE AND OBJECTIVE BOX
[facility] Division of Hospital Medicine  Heber Kirkpatrick MD  Pager [phone]    Patient is a 74y old  Male who presents with a chief complaint of urosepsis (06 Nov 2019 11:21)      SUBJECTIVE / OVERNIGHT EVENTS:  ADDITIONAL REVIEW OF SYSTEMS:    MEDICATIONS  (STANDING):  chlorhexidine 4% Liquid 1 Application(s) Topical daily  levoFLOXacin  Tablet 250 milliGRAM(s) Oral every 24 hours  levothyroxine 137 MICROGram(s) Oral daily  pantoprazole    Tablet 40 milliGRAM(s) Oral two times a day  polyethylene glycol 3350 17 Gram(s) Oral two times a day  potassium acid phosphate/sodium acid phosphate tablet (K-PHOS No. 2) 1 Tablet(s) Oral four times a day with meals    MEDICATIONS  (PRN):  acetaminophen   Tablet .. 650 milliGRAM(s) Oral every 6 hours PRN Mild Pain (1 - 3), Moderate Pain (4 - 6)  albuterol/ipratropium for Nebulization. 3 milliLiter(s) Nebulizer every 4 hours PRN Wheezing  bisacodyl Suppository 10 milliGRAM(s) Rectal daily PRN Constipation  melatonin 3 milliGRAM(s) Oral at bedtime PRN Insomnia  simethicone 80 milliGRAM(s) Chew four times a day PRN Gas      CAPILLARY BLOOD GLUCOSE        I&O's Summary    06 Nov 2019 07:01  -  07 Nov 2019 07:00  --------------------------------------------------------  IN: 1860 mL / OUT: 3585 mL / NET: -1725 mL    07 Nov 2019 07:01  -  07 Nov 2019 08:47  --------------------------------------------------------  IN: 0 mL / OUT: 500 mL / NET: -500 mL        PHYSICAL EXAM:  Vital Signs Last 24 Hrs  T(C): 36.6 (07 Nov 2019 04:25), Max: 36.8 (06 Nov 2019 12:29)  T(F): 97.8 (07 Nov 2019 04:25), Max: 98.2 (06 Nov 2019 12:29)  HR: 75 (07 Nov 2019 07:02) (72 - 95)  BP: 116/89 (07 Nov 2019 04:25) (116/89 - 139/78)  BP(mean): --  RR: 16 (07 Nov 2019 04:25) (16 - 18)  SpO2: 96% (07 Nov 2019 07:02) (96% - 100%)  CONSTITUTIONAL:   EYES:   ENMT:   NECK:   RESPIRATORY:   CARDIOVASCULAR:   ABDOMEN:   MUSCULOSKELETAL:   PSYCH:   NEUROLOGY:   SKIN:    LABS:                        11.1   13.16 )-----------( 151      ( 07 Nov 2019 05:09 )             36.6     11-07    140  |  106  |  32<H>  ----------------------------<  102<H>  4.7   |  23  |  2.60<H>    Ca    9.0      07 Nov 2019 05:09  Phos  2.3     11-06  Mg     2.1     11-06                  RADIOLOGY & ADDITIONAL TESTS:  Results Reviewed:   Imaging Personally Reviewed:  Electrocardiogram Personally Reviewed:    COORDINATION OF CARE:  Care Discussed with Consultants/Other Providers [Y/N]:  Prior or Outpatient Records Reviewed [Y/N]: [facility] Division of Hospital Medicine  Heber Kirkpatrick MD  Pager [phone]    Patient is a 74y old  Male who presents with a chief complaint of urosepsis (06 Nov 2019 11:21)      SUBJECTIVE / OVERNIGHT EVENTS:  Feels well, denies fever/pain. Is ready to go home. Anticipatory guidance provided. Appointment made for patient to followup with urologist (re: leslie, nephrostomy tube). Daughter and wife also in agreement with discharge plan.     MEDICATIONS  (STANDING):  chlorhexidine 4% Liquid 1 Application(s) Topical daily  levoFLOXacin  Tablet 250 milliGRAM(s) Oral every 24 hours  levothyroxine 137 MICROGram(s) Oral daily  pantoprazole    Tablet 40 milliGRAM(s) Oral two times a day  polyethylene glycol 3350 17 Gram(s) Oral two times a day  potassium acid phosphate/sodium acid phosphate tablet (K-PHOS No. 2) 1 Tablet(s) Oral four times a day with meals    MEDICATIONS  (PRN):  acetaminophen   Tablet .. 650 milliGRAM(s) Oral every 6 hours PRN Mild Pain (1 - 3), Moderate Pain (4 - 6)  albuterol/ipratropium for Nebulization. 3 milliLiter(s) Nebulizer every 4 hours PRN Wheezing  bisacodyl Suppository 10 milliGRAM(s) Rectal daily PRN Constipation  melatonin 3 milliGRAM(s) Oral at bedtime PRN Insomnia  simethicone 80 milliGRAM(s) Chew four times a day PRN Gas      CAPILLARY BLOOD GLUCOSE    I&O's Summary    06 Nov 2019 07:01  -  07 Nov 2019 07:00  --------------------------------------------------------  IN: 1860 mL / OUT: 3585 mL / NET: -1725 mL    07 Nov 2019 07:01  -  07 Nov 2019 08:47  --------------------------------------------------------  IN: 0 mL / OUT: 500 mL / NET: -500 mL    PHYSICAL EXAM:  Vital Signs Last 24 Hrs  T(C): 36.6 (07 Nov 2019 04:25), Max: 36.8 (06 Nov 2019 12:29)  T(F): 97.8 (07 Nov 2019 04:25), Max: 98.2 (06 Nov 2019 12:29)  HR: 75 (07 Nov 2019 07:02) (72 - 95)  BP: 116/89 (07 Nov 2019 04:25) (116/89 - 139/78)  BP(mean): --  RR: 16 (07 Nov 2019 04:25) (16 - 18)  SpO2: 96% (07 Nov 2019 07:02) (96% - 100%)    CONSTITUTIONAL: NAD, sitting in chair, wife at bedside  EYES: EOMI, clear sclera/conjunctiva  ENMT: MMM  NECK: supple  RESPIRATORY: Comfortable on RA, CTAB  CARDIOVASCULAR: S1S2 RRR  ABDOMEN: soft, non-tender, +R nephrostomy tube  MUSCULOSKELETAL: no c/c/e  PSYCH: calm  NEUROLOGY: moving all extremities, non-focal   : +leslie    LABS:                        11.1   13.16 )-----------( 151      ( 07 Nov 2019 05:09 )             36.6     11-07    140  |  106  |  32<H>  ----------------------------<  102<H>  4.7   |  23  |  2.60<H>    Ca    9.0      07 Nov 2019 05:09  Phos  2.3     11-06  Mg     2.1     11-06      RADIOLOGY & ADDITIONAL TESTS:  Results Reviewed:   Imaging Personally Reviewed:  Electrocardiogram Personally Reviewed:    COORDINATION OF CARE:  Care Discussed with Consultants/Other Providers [Y]: Medicine ACP Kim, VARSHA Ewing re: hospital course, otpt urology followup  Prior or Outpatient Records Reviewed [Y/N]:

## 2019-11-07 NOTE — PROGRESS NOTE ADULT - PROBLEM SELECTOR PLAN 1
- nonobstructive   - miralax BID   - milk of mg prn   - dulcolax supp prn   - no gi objection to dc per primary team

## 2019-11-07 NOTE — PROGRESS NOTE ADULT - PROBLEM SELECTOR PROBLEM 4
ACP (advance care planning)
R/O Astrid

## 2019-11-07 NOTE — PROGRESS NOTE ADULT - PROBLEM SELECTOR PROBLEM 1
Constipation by delayed colonic transit
Constipation by delayed colonic transit
R/O Astrid
Septic shock due to Escherichia coli

## 2019-11-07 NOTE — PROGRESS NOTE ADULT - PROBLEM SELECTOR PLAN 2
- suspect related to sepsis w/US Abd reviewed with no biliary obstruction; pt asymptomatic   - no gi objection to dc per primary team

## 2019-11-07 NOTE — DISCHARGE NOTE PROVIDER - NSDCMRMEDTOKEN_GEN_ALL_CORE_FT
amLODIPine 10 mg oral tablet: 1 tab(s) orally once a day  atorvastatin 20 mg oral tablet: 1 tab(s) orally once a day (at bedtime)  levoFLOXacin 250 mg oral tablet: 1 tab(s) orally every 24 hours  levothyroxine 112 mcg (0.112 mg) oral tablet: 1 tab(s) orally once a day  metoprolol tartrate 25 mg oral tablet:  orally once a day  omeprazole 20 mg oral delayed release capsule: 1 cap(s) orally once a day amLODIPine 10 mg oral tablet: 1 tab(s) orally once a day  atorvastatin 20 mg oral tablet: 1 tab(s) orally once a day (at bedtime)  levoFLOXacin 250 mg oral tablet: 1 tab(s) orally every 24 hours  levothyroxine 112 mcg (0.112 mg) oral tablet: 1 tab(s) orally once a day  metoprolol tartrate 25 mg oral tablet:  orally once a day  omeprazole 20 mg oral delayed release capsule: 1 cap(s) orally once a day  Physical therapy: Physical therapy 2- 3 times a week atorvastatin 20 mg oral tablet: 1 tab(s) orally once a day (at bedtime)  levoFLOXacin 250 mg oral tablet: 1 tab(s) orally every 24 hours  levothyroxine 112 mcg (0.112 mg) oral tablet: 1 tab(s) orally once a day  omeprazole 20 mg oral delayed release capsule: 1 cap(s) orally once a day  Physical therapy: Physical therapy 2- 3 times a week

## 2019-11-07 NOTE — PROGRESS NOTE ADULT - PROBLEM SELECTOR PROBLEM 2
Hydronephrosis with infection
Hyperbilirubinemia
Obstructive uropathy

## 2019-11-07 NOTE — PROGRESS NOTE ADULT - PROBLEM SELECTOR PLAN 1
- E.coli bacteremia (Bcx 10/31 with E.coli), likely urinary source  - Ucx with E.coli  - repeat Bcx 11/2 with coag neg staph  - shock resolved  - still with leukocytosis, but currently afebrile  - E.coli appears pan-sensitive based on culture results  - transitioned patient to renally dosed levaquin (appreciate pharmacy assistance with renal dosing, qtc was 430 ms on EKG 10/31), 500 mg x1, followed by 250 mg daily to complete 14 day course of abx for bacteremia  - daughter [phone], in agreement with transition to PO abx - E.coli bacteremia (Bcx 10/31 with E.coli), likely urinary source  - Ucx with E.coli  - repeat Bcx 11/2 with coag neg staph  - shock resolved  - still with leukocytosis, but currently afebrile  - E.coli appears pan-sensitive based on culture results  - transitioned patient to renally dosed levaquin (appreciate pharmacy assistance with renal dosing, qtc was 430 ms on EKG 10/31), 500 mg x1, followed by 250 mg daily to complete 14 day course of abx for bacteremia  - daughter [phone], in agreement with transition to PO abx  - will followup with PMD after discharge

## 2019-11-08 ENCOUNTER — OTHER (OUTPATIENT)
Age: 74
End: 2019-11-08

## 2019-11-09 NOTE — ED PROVIDER NOTE - EKG #1 DATE/TIME
31-Oct-2019 08:16

## 2019-11-12 ENCOUNTER — APPOINTMENT (OUTPATIENT)
Dept: INTERNAL MEDICINE | Facility: CLINIC | Age: 74
End: 2019-11-12
Payer: MEDICARE

## 2019-11-12 ENCOUNTER — APPOINTMENT (OUTPATIENT)
Dept: UROLOGY | Facility: CLINIC | Age: 74
End: 2019-11-12
Payer: MEDICARE

## 2019-11-12 VITALS
TEMPERATURE: 98.6 F | DIASTOLIC BLOOD PRESSURE: 80 MMHG | RESPIRATION RATE: 12 BRPM | WEIGHT: 200 LBS | BODY MASS INDEX: 34.15 KG/M2 | SYSTOLIC BLOOD PRESSURE: 120 MMHG | HEIGHT: 64 IN | OXYGEN SATURATION: 94 % | HEART RATE: 84 BPM

## 2019-11-12 PROCEDURE — 99496 TRANSJ CARE MGMT HIGH F2F 7D: CPT

## 2019-11-12 PROCEDURE — 99214 OFFICE O/P EST MOD 30 MIN: CPT

## 2019-11-12 NOTE — PHYSICAL EXAM
[General Appearance - Well Developed] : well developed [General Appearance - Well Nourished] : well nourished [Normal Appearance] : normal appearance [Well Groomed] : well groomed [General Appearance - In No Acute Distress] : no acute distress [Abdomen Soft] : soft [Abdomen Tenderness] : non-tender [Costovertebral Angle Tenderness] : no ~M costovertebral angle tenderness [Urinary Bladder Findings] : the bladder was normal on palpation [FreeTextEntry1] : Right nephrostomy tube with clear yellow urine [Edema] : no peripheral edema [] : no respiratory distress [Respiration, Rhythm And Depth] : normal respiratory rhythm and effort [Oriented To Time, Place, And Person] : oriented to person, place, and time [Exaggerated Use Of Accessory Muscles For Inspiration] : no accessory muscle use [Affect] : the affect was normal [Mood] : the mood was normal [Normal Station and Gait] : the gait and station were normal for the patient's age [Not Anxious] : not anxious [No Focal Deficits] : no focal deficits [No Palpable Adenopathy] : no palpable adenopathy

## 2019-11-12 NOTE — ASSESSMENT
[FreeTextEntry1] : Very pleasant 74 year-old gentleman who presents for followup of BPH with new finding of distal right ureteral stone, hydronephrosis, sepsis, status post right nephrostomy tube\par -CT images reviewed with the patient\par -Labs reviewed\par -Trial of void tomorrow\par -We discussed that he will need a procedure for kidney stones prior to nephrostomy tube removal\par -We will schedule ureteroscopy and stone extraction after addressing Geronimo catheter

## 2019-11-12 NOTE — HISTORY OF PRESENT ILLNESS
[FreeTextEntry1] : Very pleasant 74-year-old gentleman who presents for followup of BPH with new complaint of right ureteral stone status post right nephrostomy tube and urinary retention. Patient recently presented to the emergency department complaining of severe right flank pain and sepsis secondary to an obstructing distal right ureteral stone. At that time his creatinine was noted to be elevated. A right nephrostomy tube was placed and a Geronimo catheter was placed for drainage as well. He presents today for additional management options. He denies flank pain. No hematuria. No dysuria. No nausea or vomiting. He feels much better since leaving the hospital. [Urinary Retention] : urinary retention [Urinary Urgency] : no urinary urgency [Dysuria] : no dysuria [Hematuria - Gross] : no gross hematuria [Bladder Spasm] : no bladder spasm [Abdominal Pain] : no abdominal pain [Flank Pain] : flank pain [Fever] : no fever [Fatigue] : no fatigue [Nausea] : no nausea [Anorexia] : no anorexia

## 2019-11-12 NOTE — COUNSELING
[Weight management counseling provided] : Weight management [Good understanding] : Patient has a good understanding of disease, goals and obesity follow-up plan [Activity counseling provided] : activity [Healthy eating counseling provided] : healthy eating [None] : None

## 2019-11-13 ENCOUNTER — APPOINTMENT (OUTPATIENT)
Dept: UROLOGY | Facility: CLINIC | Age: 74
End: 2019-11-13
Payer: MEDICARE

## 2019-11-13 ENCOUNTER — TRANSCRIPTION ENCOUNTER (OUTPATIENT)
Age: 74
End: 2019-11-13

## 2019-11-13 VITALS
SYSTOLIC BLOOD PRESSURE: 109 MMHG | DIASTOLIC BLOOD PRESSURE: 72 MMHG | HEART RATE: 85 BPM | WEIGHT: 200 LBS | BODY MASS INDEX: 34.15 KG/M2 | HEIGHT: 64 IN

## 2019-11-13 LAB
ALBUMIN SERPL ELPH-MCNC: 3.3 G/DL
ALP BLD-CCNC: 113 U/L
ALT SERPL-CCNC: 16 U/L
ANION GAP SERPL CALC-SCNC: 15 MMOL/L
AST SERPL-CCNC: 18 U/L
BASOPHILS # BLD AUTO: 0.1 K/UL
BASOPHILS NFR BLD AUTO: 0.9 %
BILIRUB SERPL-MCNC: 0.3 MG/DL
BUN SERPL-MCNC: 16 MG/DL
CALCIUM SERPL-MCNC: 8.8 MG/DL
CHLORIDE SERPL-SCNC: 105 MMOL/L
CHOLEST SERPL-MCNC: 129 MG/DL
CHOLEST/HDLC SERPL: 5.6 RATIO
CO2 SERPL-SCNC: 20 MMOL/L
CREAT SERPL-MCNC: 2.12 MG/DL
EOSINOPHIL # BLD AUTO: 0.14 K/UL
EOSINOPHIL NFR BLD AUTO: 1.3 %
ESTIMATED AVERAGE GLUCOSE: 128 MG/DL
GGT SERPL-CCNC: 50 U/L
GLUCOSE SERPL-MCNC: 121 MG/DL
HBA1C MFR BLD HPLC: 6.1 %
HCT VFR BLD CALC: 38.1 %
HDLC SERPL-MCNC: 23 MG/DL
HGB BLD-MCNC: 11.2 G/DL
IMM GRANULOCYTES NFR BLD AUTO: 0.7 %
IRON SATN MFR SERPL: 22 %
IRON SERPL-MCNC: 72 UG/DL
LDLC SERPL CALC-MCNC: 59 MG/DL
LYMPHOCYTES # BLD AUTO: 1.33 K/UL
LYMPHOCYTES NFR BLD AUTO: 12.3 %
MAN DIFF?: NORMAL
MCHC RBC-ENTMCNC: 26 PG
MCHC RBC-ENTMCNC: 29.4 GM/DL
MCV RBC AUTO: 88.6 FL
MONOCYTES # BLD AUTO: 0.83 K/UL
MONOCYTES NFR BLD AUTO: 7.7 %
NEUTROPHILS # BLD AUTO: 8.34 K/UL
NEUTROPHILS NFR BLD AUTO: 77.1 %
PLATELET # BLD AUTO: 406 K/UL
POTASSIUM SERPL-SCNC: 4.6 MMOL/L
PROT SERPL-MCNC: 5.8 G/DL
RBC # BLD: 4.3 M/UL
RBC # FLD: 16.8 %
SODIUM SERPL-SCNC: 140 MMOL/L
T3 SERPL-MCNC: 47 NG/DL
T4 FREE SERPL-MCNC: 0.5 NG/DL
TIBC SERPL-MCNC: 323 UG/DL
TRIGL SERPL-MCNC: 236 MG/DL
TSH SERPL-ACNC: 92.7 UIU/ML
UIBC SERPL-MCNC: 251 UG/DL
VIT B12 SERPL-MCNC: 917 PG/ML
WBC # FLD AUTO: 10.82 K/UL

## 2019-11-13 PROCEDURE — 99214 OFFICE O/P EST MOD 30 MIN: CPT | Mod: 25

## 2019-11-13 PROCEDURE — 51700 IRRIGATION OF BLADDER: CPT

## 2019-11-13 NOTE — PHYSICAL EXAM
[General Appearance - Well Nourished] : well nourished [General Appearance - Well Developed] : well developed [Well Groomed] : well groomed [Normal Appearance] : normal appearance [Abdomen Soft] : soft [General Appearance - In No Acute Distress] : no acute distress [Abdomen Tenderness] : non-tender [Costovertebral Angle Tenderness] : no ~M costovertebral angle tenderness [Urinary Bladder Findings] : the bladder was normal on palpation [FreeTextEntry1] : Right nephrostomy tube with clear yellow urine [Edema] : no peripheral edema [] : no respiratory distress [Exaggerated Use Of Accessory Muscles For Inspiration] : no accessory muscle use [Respiration, Rhythm And Depth] : normal respiratory rhythm and effort [Oriented To Time, Place, And Person] : oriented to person, place, and time [Affect] : the affect was normal [Mood] : the mood was normal [Not Anxious] : not anxious [Normal Station and Gait] : the gait and station were normal for the patient's age [No Focal Deficits] : no focal deficits [No Palpable Adenopathy] : no palpable adenopathy

## 2019-11-13 NOTE — ASSESSMENT
[FreeTextEntry1] : Very pleasant 74 year-old gentleman presents for followup of right ureteral stones, BPH\par -I discussed the different treatment modalities for nephrolithiasis with the patient, including medical management, spontaneous stone passage, percutaneous stone extraction, extracorporeal shock wave lithotripsy, and ureteroscopy with laser lithotripsy and stone extraction. Given the size and location of the stone, I recommend and the patient opted to proceed with ureteroscopy.  The risks, benefits, and alternatives to ureteroscopy were discussed with the patient, including but not limited to pain, infection, bleeding, bladder injury, ureteral injury, renal injury, and treatment failure.  I also discussed the possible need for a temporary ureteral stent.  I discussed the possible side effects of a temporary ureteral stent, including flank pain, hematuria, and bladder spasms.  The patient understands that a stent is a temporary implant that must be removed in the future.  The patient wishes to proceed and we will schedule the surgery for the near future.\par -Medical clearance

## 2019-11-13 NOTE — HISTORY OF PRESENT ILLNESS
[FreeTextEntry1] : Very pleasant 74-year-old gentleman who presents for followup of BPH, right ureteral stone status post right nephrostomy tube and urinary retention. Patient underwent trial of void today and was able urinate well. He reports mild burning when the catheter came out. He denies dysuria. No hematuria. No flank pain or suprapubic pain. He now presents for a discussion of further management options for right ureteral stones. [Urinary Urgency] : no urinary urgency [Urinary Retention] : urinary retention [Hematuria - Gross] : no gross hematuria [Dysuria] : no dysuria [Bladder Spasm] : no bladder spasm [Abdominal Pain] : no abdominal pain [Flank Pain] : flank pain [Fever] : no fever [Fatigue] : no fatigue [Anorexia] : no anorexia [Nausea] : no nausea

## 2019-11-14 ENCOUNTER — APPOINTMENT (OUTPATIENT)
Dept: UROLOGY | Facility: CLINIC | Age: 74
End: 2019-11-14
Payer: MEDICARE

## 2019-11-14 ENCOUNTER — TRANSCRIPTION ENCOUNTER (OUTPATIENT)
Age: 74
End: 2019-11-14

## 2019-11-14 ENCOUNTER — INPATIENT (INPATIENT)
Facility: HOSPITAL | Age: 74
LOS: 0 days | Discharge: NURSING, REHAB OR LONG-TERM CARE | DRG: 699 | End: 2019-11-15
Attending: HOSPITALIST | Admitting: HOSPITALIST
Payer: COMMERCIAL

## 2019-11-14 VITALS
RESPIRATION RATE: 16 BRPM | HEIGHT: 64 IN | DIASTOLIC BLOOD PRESSURE: 82 MMHG | BODY MASS INDEX: 34.15 KG/M2 | WEIGHT: 200 LBS | SYSTOLIC BLOOD PRESSURE: 136 MMHG | HEART RATE: 81 BPM

## 2019-11-14 VITALS
DIASTOLIC BLOOD PRESSURE: 78 MMHG | SYSTOLIC BLOOD PRESSURE: 147 MMHG | WEIGHT: 209 LBS | RESPIRATION RATE: 17 BRPM | TEMPERATURE: 98 F | HEIGHT: 64 IN | OXYGEN SATURATION: 99 % | HEART RATE: 76 BPM

## 2019-11-14 DIAGNOSIS — T83.022A DISPLACEMENT OF NEPHROSTOMY CATHETER, INITIAL ENCOUNTER: ICD-10-CM

## 2019-11-14 DIAGNOSIS — Z29.9 ENCOUNTER FOR PROPHYLACTIC MEASURES, UNSPECIFIED: ICD-10-CM

## 2019-11-14 DIAGNOSIS — Z95.5 PRESENCE OF CORONARY ANGIOPLASTY IMPLANT AND GRAFT: Chronic | ICD-10-CM

## 2019-11-14 DIAGNOSIS — Z98.89 OTHER SPECIFIED POSTPROCEDURAL STATES: Chronic | ICD-10-CM

## 2019-11-14 DIAGNOSIS — Z87.442 PERSONAL HISTORY OF URINARY CALCULI: Chronic | ICD-10-CM

## 2019-11-14 DIAGNOSIS — Z96.652 PRESENCE OF LEFT ARTIFICIAL KNEE JOINT: Chronic | ICD-10-CM

## 2019-11-14 DIAGNOSIS — H26.40 UNSPECIFIED SECONDARY CATARACT: Chronic | ICD-10-CM

## 2019-11-14 DIAGNOSIS — E87.5 HYPERKALEMIA: ICD-10-CM

## 2019-11-14 DIAGNOSIS — Z02.9 ENCOUNTER FOR ADMINISTRATIVE EXAMINATIONS, UNSPECIFIED: ICD-10-CM

## 2019-11-14 LAB
ALBUMIN SERPL ELPH-MCNC: 3.5 G/DL — SIGNIFICANT CHANGE UP (ref 3.3–5)
ALBUMIN SERPL ELPH-MCNC: 3.6 G/DL — SIGNIFICANT CHANGE UP (ref 3.3–5)
ALP SERPL-CCNC: 101 U/L — SIGNIFICANT CHANGE UP (ref 40–120)
ALP SERPL-CCNC: 102 U/L — SIGNIFICANT CHANGE UP (ref 40–120)
ALT FLD-CCNC: 17 U/L — SIGNIFICANT CHANGE UP (ref 10–45)
ALT FLD-CCNC: 20 U/L — SIGNIFICANT CHANGE UP (ref 10–45)
ANION GAP SERPL CALC-SCNC: 11 MMOL/L — SIGNIFICANT CHANGE UP (ref 5–17)
ANION GAP SERPL CALC-SCNC: 13 MMOL/L — SIGNIFICANT CHANGE UP (ref 5–17)
APTT BLD: 28.4 SEC — SIGNIFICANT CHANGE UP (ref 27.5–36.3)
AST SERPL-CCNC: 33 U/L — SIGNIFICANT CHANGE UP (ref 10–40)
AST SERPL-CCNC: 43 U/L — HIGH (ref 10–40)
BASOPHILS # BLD AUTO: 0.07 K/UL — SIGNIFICANT CHANGE UP (ref 0–0.2)
BASOPHILS NFR BLD AUTO: 0.6 % — SIGNIFICANT CHANGE UP (ref 0–2)
BILIRUB SERPL-MCNC: 0.6 MG/DL — SIGNIFICANT CHANGE UP (ref 0.2–1.2)
BILIRUB SERPL-MCNC: 0.6 MG/DL — SIGNIFICANT CHANGE UP (ref 0.2–1.2)
BLD GP AB SCN SERPL QL: NEGATIVE — SIGNIFICANT CHANGE UP
BUN SERPL-MCNC: 14 MG/DL — SIGNIFICANT CHANGE UP (ref 7–23)
BUN SERPL-MCNC: 14 MG/DL — SIGNIFICANT CHANGE UP (ref 7–23)
CALCIUM SERPL-MCNC: 9.3 MG/DL — SIGNIFICANT CHANGE UP (ref 8.4–10.5)
CALCIUM SERPL-MCNC: 9.5 MG/DL — SIGNIFICANT CHANGE UP (ref 8.4–10.5)
CHLORIDE SERPL-SCNC: 103 MMOL/L — SIGNIFICANT CHANGE UP (ref 96–108)
CHLORIDE SERPL-SCNC: 104 MMOL/L — SIGNIFICANT CHANGE UP (ref 96–108)
CO2 SERPL-SCNC: 22 MMOL/L — SIGNIFICANT CHANGE UP (ref 22–31)
CO2 SERPL-SCNC: 23 MMOL/L — SIGNIFICANT CHANGE UP (ref 22–31)
CREAT SERPL-MCNC: 1.94 MG/DL — HIGH (ref 0.5–1.3)
CREAT SERPL-MCNC: 1.97 MG/DL — HIGH (ref 0.5–1.3)
EOSINOPHIL # BLD AUTO: 0.1 K/UL — SIGNIFICANT CHANGE UP (ref 0–0.5)
EOSINOPHIL NFR BLD AUTO: 0.9 % — SIGNIFICANT CHANGE UP (ref 0–6)
GLUCOSE SERPL-MCNC: 103 MG/DL — HIGH (ref 70–99)
GLUCOSE SERPL-MCNC: 105 MG/DL — HIGH (ref 70–99)
HCT VFR BLD CALC: 38.5 % — LOW (ref 39–50)
HGB BLD-MCNC: 11.8 G/DL — LOW (ref 13–17)
IMM GRANULOCYTES NFR BLD AUTO: 0.4 % — SIGNIFICANT CHANGE UP (ref 0–1.5)
INR BLD: 1.05 RATIO — SIGNIFICANT CHANGE UP (ref 0.88–1.16)
LYMPHOCYTES # BLD AUTO: 1.46 K/UL — SIGNIFICANT CHANGE UP (ref 1–3.3)
LYMPHOCYTES # BLD AUTO: 13.5 % — SIGNIFICANT CHANGE UP (ref 13–44)
MCHC RBC-ENTMCNC: 25.9 PG — LOW (ref 27–34)
MCHC RBC-ENTMCNC: 30.6 GM/DL — LOW (ref 32–36)
MCV RBC AUTO: 84.6 FL — SIGNIFICANT CHANGE UP (ref 80–100)
MONOCYTES # BLD AUTO: 0.85 K/UL — SIGNIFICANT CHANGE UP (ref 0–0.9)
MONOCYTES NFR BLD AUTO: 7.9 % — SIGNIFICANT CHANGE UP (ref 2–14)
NEUTROPHILS # BLD AUTO: 8.26 K/UL — HIGH (ref 1.8–7.4)
NEUTROPHILS NFR BLD AUTO: 76.7 % — SIGNIFICANT CHANGE UP (ref 43–77)
NRBC # BLD: 0 /100 WBCS — SIGNIFICANT CHANGE UP (ref 0–0)
PLATELET # BLD AUTO: 468 K/UL — HIGH (ref 150–400)
POTASSIUM SERPL-MCNC: 5.4 MMOL/L — HIGH (ref 3.5–5.3)
POTASSIUM SERPL-MCNC: 6.1 MMOL/L — HIGH (ref 3.5–5.3)
POTASSIUM SERPL-SCNC: 5.4 MMOL/L — HIGH (ref 3.5–5.3)
POTASSIUM SERPL-SCNC: 6.1 MMOL/L — HIGH (ref 3.5–5.3)
PROT SERPL-MCNC: 7 G/DL — SIGNIFICANT CHANGE UP (ref 6–8.3)
PROT SERPL-MCNC: 7.2 G/DL — SIGNIFICANT CHANGE UP (ref 6–8.3)
PROTHROM AB SERPL-ACNC: 12.1 SEC — SIGNIFICANT CHANGE UP (ref 10–12.9)
RBC # BLD: 4.55 M/UL — SIGNIFICANT CHANGE UP (ref 4.2–5.8)
RBC # FLD: 16.5 % — HIGH (ref 10.3–14.5)
RH IG SCN BLD-IMP: POSITIVE — SIGNIFICANT CHANGE UP
SODIUM SERPL-SCNC: 138 MMOL/L — SIGNIFICANT CHANGE UP (ref 135–145)
SODIUM SERPL-SCNC: 138 MMOL/L — SIGNIFICANT CHANGE UP (ref 135–145)
WBC # BLD: 10.78 K/UL — HIGH (ref 3.8–10.5)
WBC # FLD AUTO: 10.78 K/UL — HIGH (ref 3.8–10.5)

## 2019-11-14 PROCEDURE — 99223 1ST HOSP IP/OBS HIGH 75: CPT

## 2019-11-14 PROCEDURE — 74176 CT ABD & PELVIS W/O CONTRAST: CPT | Mod: 26

## 2019-11-14 PROCEDURE — 99214 OFFICE O/P EST MOD 30 MIN: CPT

## 2019-11-14 PROCEDURE — 74018 RADEX ABDOMEN 1 VIEW: CPT | Mod: 26

## 2019-11-14 PROCEDURE — 99285 EMERGENCY DEPT VISIT HI MDM: CPT

## 2019-11-14 RX ORDER — PANTOPRAZOLE SODIUM 20 MG/1
40 TABLET, DELAYED RELEASE ORAL
Refills: 0 | Status: DISCONTINUED | OUTPATIENT
Start: 2019-11-14 | End: 2019-11-15

## 2019-11-14 RX ORDER — LEVOTHYROXINE SODIUM 125 MCG
112 TABLET ORAL DAILY
Refills: 0 | Status: DISCONTINUED | OUTPATIENT
Start: 2019-11-14 | End: 2019-11-15

## 2019-11-14 RX ORDER — ACETAMINOPHEN 500 MG
650 TABLET ORAL EVERY 6 HOURS
Refills: 0 | Status: DISCONTINUED | OUTPATIENT
Start: 2019-11-14 | End: 2019-11-15

## 2019-11-14 RX ORDER — INFLUENZA VIRUS VACCINE 15; 15; 15; 15 UG/.5ML; UG/.5ML; UG/.5ML; UG/.5ML
0.5 SUSPENSION INTRAMUSCULAR ONCE
Refills: 0 | Status: DISCONTINUED | OUTPATIENT
Start: 2019-11-14 | End: 2019-11-15

## 2019-11-14 RX ORDER — ATORVASTATIN CALCIUM 80 MG/1
20 TABLET, FILM COATED ORAL AT BEDTIME
Refills: 0 | Status: DISCONTINUED | OUTPATIENT
Start: 2019-11-14 | End: 2019-11-15

## 2019-11-14 RX ADMIN — ATORVASTATIN CALCIUM 20 MILLIGRAM(S): 80 TABLET, FILM COATED ORAL at 23:46

## 2019-11-14 NOTE — H&P ADULT - ASSESSMENT
74 M PMH HTN, HLD, CAD s/p stent, prediabetes, congenital atrophic R kidney, previous kidney stones, recent admission for urosepsis/ecoli bacteremia in setting of R ureteral stones s/p IR nephrostomy tube 11/1, now presents after decreased NT output and outpatient films showing tube dislodgement.

## 2019-11-14 NOTE — H&P ADULT - PROBLEM SELECTOR PLAN 4
Transitions of Care Status:  1.  Name of PCP: urologist Dr. Cruz  2.  PCP Contacted on Admission: [ ] Y    [x ] N    3.  PCP contacted at Discharge: [ ] Y    [ ] N    [ ] N/A  4.  Post-Discharge Appointment Date and Location:  5.  Summary of Handoff given to PCP:

## 2019-11-14 NOTE — ASSESSMENT
[FreeTextEntry1] : pcn flushed with leakage around tube \par nephrostogram performed with extravof contrast \par advise pt to go to [facility] er to have pcn repositioned

## 2019-11-14 NOTE — H&P ADULT - NSHPLABSRESULTS_GEN_ALL_CORE
Labs personally reviewed  leukocytosis 10.78 that has downtrended from prior, chronic stable anemia, plt 468, rest cbc unrevealing, coags wnl, hemolyzed hyperK x 2 (6.1, 5.4), downtrended elevated Scr 1.94 downtrended from recent admission 3.52.  Imaging personally reviewed  XR KUB shows tip of NT overlying R kidney. CTAP shows dislodgement of R NT, unchanged ureteral calculi.    EKG personally reviewed Labs personally reviewed  leukocytosis 10.78 that has downtrended from prior, chronic stable anemia, plt 468, rest cbc unrevealing, coags wnl, hemolyzed hyperK x 2 (6.1, 5.4), downtrended elevated Scr 1.94 downtrended from recent admission 3.52.  Imaging personally reviewed  XR KUB shows tip of NT overlying R kidney. CTAP shows dislodgement of R NT, unchanged ureteral calculi.    No ekg in chart.

## 2019-11-14 NOTE — ED PROVIDER NOTE - PROGRESS NOTE DETAILS
Uro returned page and advised they will discuss w/ Dr. Cruz regarding recs. - LICHA RodriguezC Uro called back, they d/w Dr. Cruz who wants tube replaced, pt likely TBA medicine for replacement, however paged IR to discuss, awaiting return call to confirm plan. - LICHA RodriguezC Spoke w/ IR who reviewed CT scan and state tube is out. They will d/w attending to see if case can be done tonight, if not will add on to schedule tomorrow. - Brayan Hector PA-C IR fellow roz called back after d/w attending, plan is to perform R nephrostomy tube replacement tomorrow with Dr. Morales, he requested order be place, NPO and no dvt prophylaxis aside from leg compression until procedure. Will admit to medicine. - Brayan Hector PA-C IR fellow [place] called back after d/w attending, plan is to perform R nephrostomy tube replacement tomorrow with Dr. Morales, he requested order be place, NPO and no dvt prophylaxis aside from leg compression until procedure. Will admit to medicine. - Baryan Hector PA-C Pt endorsed to hospitalist Dr. Mancini for admission. Informed of workup and consults w/ IR/uro and plan per them. Accepted pt for admission. - Brayan Hector PA-C

## 2019-11-14 NOTE — CONSULT NOTE ADULT - SUBJECTIVE AND OBJECTIVE BOX
HPI:  Patient is a 74year old male with recent septic shock secondary to UTI and obstructing right ureteral calculi s/p placement of R PCN presents to ED after nephrostomy tube became dislodged    PAST MEDICAL & SURGICAL HISTORY:  Borderline diabetes  Hyperlipidemia  Obesity (BMI 30-39.9)  CAD (coronary artery disease)  Hypothyroid  Hypertension  After cataract, bilateral  S/P hernia repair: left inguinal  History of kidney stones  H/O total knee replacement, left  H/O heart artery stent: 2013    FAMILY HISTORY:  Family history of hypertension: mother  Family history of diabetes mellitus (Sibling): sister and bothers    SOCIAL HISTORY:   Tobacco hx:    MEDICATIONS  (STANDING):    MEDICATIONS  (PRN):    Allergies    No Known Allergies    Intolerances        REVIEW OF SYSTEMS: Pertinent positives and negatives as stated in HPI, otherwise negative    Vital signs  T(C): 36.8 (11-14-19 @ 20:43), Max: 36.8 (11-14-19 @ 20:43)  HR: 86 (11-14-19 @ 20:43)  BP: 122/76 (11-14-19 @ 20:43)  SpO2: 98% (11-14-19 @ 20:43)  Wt(kg): --    Output    UOP      LABS:        11-14 @ 19:45    WBC --    / Hct --    / SCr 1.94     11-14 @ 17:37    WBC 10.78 / Hct 38.5  / SCr 1.97     11-14    138  |  103  |  14  ----------------------------<  103<H>  5.4<H>   |  22  |  1.94<H>    Ca    9.3      14 Nov 2019 19:45    TPro  7.0  /  Alb  3.5  /  TBili  0.6  /  DBili  x   /  AST  33  /  ALT  17  /  AlkPhos  102  11-14    PT/INR - ( 14 Nov 2019 17:37 )   PT: 12.1 sec;   INR: 1.05 ratio         PTT - ( 14 Nov 2019 17:37 )  PTT:28.4 sec      Urine Cx:   Blood Cx:    RADIOLOGY: HPI:  Very pleasant 74year old male with recent septic shock secondary to UTI and obstructing right ureteral calculi s/p placement of R PCN presents to ED after nephrostomy tube became dislodged. He notes no drainage from the nephrostomy tube over the last 24 hours. An antegrade study in the office demonstrated the tube to be just outside the collecting system with entry of contrast into the collecting system. He denies fevers or chills. No nausea or vomiting. No flank pain or suprapubic pain. No other complaints. No specific timing to his symptoms. No aggravating or alleviating factors that he knows of.    PAST MEDICAL & SURGICAL HISTORY:  Borderline diabetes  Hyperlipidemia  Obesity (BMI 30-39.9)  CAD (coronary artery disease)  Hypothyroid  Hypertension  After cataract, bilateral  S/P hernia repair: left inguinal  History of kidney stones  H/O total knee replacement, left  H/O heart artery stent: 2013    FAMILY HISTORY:  Family history of hypertension: mother  Family history of diabetes mellitus (Sibling): sister and bothers    SOCIAL HISTORY: Does not smoke    Home Medications:  atorvastatin 20 mg oral tablet: 1 tab(s) orally once a day (at bedtime) (14 Nov 2019 22:41)  levothyroxine 137 mcg (0.137 mg) oral tablet: 1 tab(s) orally once a day (14 Nov 2019 22:41)  omeprazole 40 mg oral delayed release capsule: 1 cap(s) orally once a day (14 Nov 2019 22:41)  Tylenol Extra Strength 500 mg oral tablet: 2 tab(s) orally , As Needed (14 Nov 2019 22:41)      Allergies    No Known Allergies    Intolerances        REVIEW OF SYSTEMS: Pertinent positives and negatives as stated in HPI, otherwise negative    Vital signs  T(C): 36.8 (11-14-19 @ 20:43), Max: 36.8 (11-14-19 @ 20:43)  HR: 86 (11-14-19 @ 20:43)  BP: 122/76 (11-14-19 @ 20:43)  SpO2: 98% (11-14-19 @ 20:43)  Wt(kg): --    Output    UOP      LABS:        11-14 @ 19:45    WBC --    / Hct --    / SCr 1.94     11-14 @ 17:37    WBC 10.78 / Hct 38.5  / SCr 1.97     11-14    138  |  103  |  14  ----------------------------<  103<H>  5.4<H>   |  22  |  1.94<H>    Ca    9.3      14 Nov 2019 19:45    TPro  7.0  /  Alb  3.5  /  TBili  0.6  /  DBili  x   /  AST  33  /  ALT  17  /  AlkPhos  102  11-14    PT/INR - ( 14 Nov 2019 17:37 )   PT: 12.1 sec;   INR: 1.05 ratio         PTT - ( 14 Nov 2019 17:37 )  PTT:28.4 sec    antegrade nephrostogram in [facility]: NT just outside collecting system    EXAM:  CT ABDOMEN AND PELVIS                            PROCEDURE DATE:  11/14/2019            INTERPRETATION:  CLINICAL INFORMATION: Status post right nephrostomy tube   placement on 11/1/2019 for urosepsis due to nephrolithiasis. No output   fromthe right nephrostomy tube yesterday. Noted erythema round tube site.    COMPARISON: IR procedure 11/1/2019; CT abdomen and pelvis 10/31/2019.   Abdominal ultrasound 11/5/2019.    PROCEDURE:   CT of the Abdomen and Pelvis was performed without intravenous contrast.   Intravenous contrast: None.  Oral contrast: None.  Sagittal and coronal reformats were performed.    FINDINGS:    LOWER CHEST: Aortic valve calcification. Coronary artery stent.     Evaluation of the solid organs is limited without the administration of   intravenous contrast.    LIVER: Within normal limits.  BILE DUCTS: Normal caliber.  GALLBLADDER: Cholecystectomy.  SPLEEN: Within normal limits.  PANCREAS: Within normal limits.  ADRENALS: Within normal limits.  KIDNEYS/URETERS:Right nephrostomy tube with pigtail tip terminating   outside of the collecting system. Atrophic right kidney. High density   within the right proximal collecting system may be related to contrast.   Bilateral renal cysts. 1.2 cm and 0.5 cm renal calculi in the distal   right ureter, in similar position to prior exam 10/31/2019.    BLADDER: Foci of air present; correlate with recent instrumentation.  REPRODUCTIVE ORGANS: Prostate is normal in size.    BOWEL: No bowel obstruction. Colonic diverticulosis without   diverticulitis. Appendix is normal.  PERITONEUM: No ascites. No free air.  VESSELS: Atherosclerotic calcification of the aorta and its branches.   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  RETROPERITONEUM/LYMPH NODES: No lymphadenopathy.    ABDOMINAL WALL: Right gluteus medius intramuscular lipoma. Focal   calcification about the left lateral gluteus medius muscle.  BONES: Degenerative changes of the spine.    IMPRESSION:     Dislodged right nephrostomy tube. Atrophic right kidney with decreased   hydroureteronephrosis compared to prior exam 10/31/2019 and unchanged   calculi in the distal right ureter.    High density within the right collecting system may be related to   contrast administration, correlate clinically.                RADHA REYES M.D., RADIOLOGY RESIDENT  This document has been electronically signed.  NGUYỄN BIGGS M.D., ATTENDING RADIOLOGIST  This document has been electronically signed. Nov 14 2019  7:34PM

## 2019-11-14 NOTE — CHART NOTE - NSCHARTNOTEFT_GEN_A_CORE
Interventional Radiology Brief Consult Note.     Contacted by ED for possible Nephrostomy tube re-insertion, History obtained from consulting clinician and chart review.     74yoM with congenitally atrophic right kidney presents with dislodged nephrostomy tube from the urologists office, patient reports no output for 2 days. CT scan shows a dislodged tube without hydronephrosis. The patient initially had the tube placed on 11/1 secondary to an obstructing right ureteral stone with superimposed infection. Currently the patient has no fever, vitals are stable, and no leukocytosis.     - Will plan for nephrostomy tube re-insertion tomorrow.  - Please keep patient NPO  - If the patient acutely worsens please page [phone]

## 2019-11-14 NOTE — H&P ADULT - NSHPSOCIALHISTORY_GEN_ALL_CORE
Social History:    Marital Status:  (   )    (   ) Single    (   )    (  )   Occupation:   Lives with: (  ) alone  (  ) children   (  ) spouse   (  ) parents  (  ) other    Substance Use (street drugs): (  ) never used  (  ) other:  Tobacco Usage:  (   ) never smoked   (   ) former smoker   (   ) current smoker  (     ) pack year  (        ) last cigarette date  Alcohol Usage:  Sexual History: Social History:    Marital Status:  ( x  )    (   ) Single    (   )    (  )   Occupation:   Lives with: (  ) alone  (  ) children   ( x ) spouse   (  ) parents  (  ) other    Substance Use (street drugs): (  x) never used  (  ) other:  Tobacco Usage:  (   ) never smoked   (x   ) former smoker   (   ) current smoker  (     ) pack year  (        ) last cigarette date  Alcohol Usage: daily 1-2 drinks of whiskey

## 2019-11-14 NOTE — H&P ADULT - NSHPPHYSICALEXAM_GEN_ALL_CORE
PHYSICAL EXAM:    Vital Signs Last 24 Hrs  T(C): 36.8 (14 Nov 2019 20:43), Max: 36.8 (14 Nov 2019 20:43)  T(F): 98.3 (14 Nov 2019 20:43), Max: 98.3 (14 Nov 2019 20:43)  HR: 86 (14 Nov 2019 20:43) (76 - 86)  BP: 122/76 (14 Nov 2019 20:43) (122/76 - 147/78)  BP(mean): --  RR: 18 (14 Nov 2019 20:43) (17 - 18)  SpO2: 98% (14 Nov 2019 20:43) (98% - 99%)    GENERAL: NAD, well-groomed, well-developed  HEAD:  Atraumatic, Normocephalic  EYES: EOMI, PERRLA, conjunctiva and sclera clear  ENMT: No tonsillar erythema, exudates, or enlargement; Moist mucous membranes, Good dentition, No lesions  NECK: Supple, No JVD, Normal thyroid  CHEST/LUNG: Clear to percussion bilaterally; No rales, rhonchi, wheezing, or rubs  HEART: Regular rate and rhythm; No murmurs, rubs, or gallops  ABDOMEN: Soft, Nontender, Nondistended; Bowel sounds present  EXTREMITIES:  2+ Peripheral Pulses, No clubbing, cyanosis, or edema  LYMPH: No lymphadenopathy noted  SKIN: No rashes or lesions  NERVOUS SYSTEM:  Alert & Oriented X3, Good concentration; Motor Strength 5/5 B/L upper and lower extremities; DTRs 2+ intact and symmetric PHYSICAL EXAM:    Vital Signs Last 24 Hrs  T(C): 36.8 (14 Nov 2019 20:43), Max: 36.8 (14 Nov 2019 20:43)  T(F): 98.3 (14 Nov 2019 20:43), Max: 98.3 (14 Nov 2019 20:43)  HR: 86 (14 Nov 2019 20:43) (76 - 86)  BP: 122/76 (14 Nov 2019 20:43) (122/76 - 147/78)  BP(mean): --  RR: 18 (14 Nov 2019 20:43) (17 - 18)  SpO2: 98% (14 Nov 2019 20:43) (98% - 99%)    GENERAL: NAD, well-groomed, well-developed, obese.  HEAD:  Atraumatic, Normocephalic  EYES: EOMI, PERRLA, conjunctiva and sclera clear  ENMT: No tonsillar erythema, exudates, or enlargement; Moist mucous membranes, Good dentition, No lesions  NECK: Supple, No JVD, Normal thyroid  CHEST/LUNG: Clear to percussion bilaterally; No rales, rhonchi, wheezing, or rubs no resp distress or accessory muscle usage.   HEART: Regular rate and rhythm; No murmurs, rubs, or gallops, no sig LE edema.   ABDOMEN: Soft, Nontender, Nondistended; Bowel sounds present, no rebound/guarding.   EXTREMITIES:  2+ Peripheral Pulses, No clubbing, cyanosis  LYMPH: No lymphadenopathy noted, no lymphangitis  SKIN: No rashes or lesions. +R NT with mild erythema but no purulence, TTP, or drainage/fluctance from site.   NERVOUS SYSTEM:  Alert & Oriented X3, Good concentration; Motor Strength 5/5 B/L upper and lower extremities. no dysarthria no facial droop.

## 2019-11-14 NOTE — ED PROVIDER NOTE - NONTENDER LOCATION
+nephrostomy tube noted to R flank, bandage clean/dry/intact, no surrounding tenderness, no output in tube or into bag. Nontender all abdominal quadrants, no rebound, guarding or rigidity noted. No CVA ttp b/l.

## 2019-11-14 NOTE — ED PROVIDER NOTE - OBJECTIVE STATEMENT
75 yo male PMHx HTN, HLD, CAD w/ stent, hypothyroidism, congenital atrophic R kidney, recent admission to [facility] from 11/1 - 11/7 for urosepsis/septic shock 2/2 R nephrolithiasis s/p IR placed R nephrostomy tube on 11/1 presents to the ED c/o dislodged nephrostomy tube. Pt states did not have output from the tube yesterday, noted erythema round tip. Went to urologist office today, had xrays done and was told the tube was "not in place" and sent pt to ED. No output from tube today. Geronimo catheter removed 2 days ago, no issues with urination since. Denies pain around tube side, abdominal pain, n/v/d, dysuria, flank pain, fever/chills, hematuria.   Urologist: Dr. Brian Cruz

## 2019-11-14 NOTE — ED ADULT NURSE NOTE - OBJECTIVE STATEMENT
73 yo male A&OX3 presents to the ED with the c/o r nephology tube displacement. As per pt her was recently admitted to [facility] 11/1-11/7 for urosepsis. Pt states that he noticed that his nephrostomy has stopped draining yesterday. Was seen by his MD and had x ray done that showed that the tube was not in the right spot. Pt also states that he has noticed some redness around the nephrostomy site. Denies fevers, chills no warmth to site. Pt has not had drainage in the tube at all today. Abd round, soft non tender and non distended. Pt also states that he had left the hospital with Geronimo and it was removed 2 days ago, no issues urinating since. Pt denies ant pain or discomfort. MAEX4, family at bedside

## 2019-11-14 NOTE — H&P ADULT - HISTORY OF PRESENT ILLNESS
74 M PMH HTN, HLD, CAD s/p stent, prediabetes, congenital atrophic R kidney, previous kidney stones, recent admission for urosepsis/ecoli bacteremia in setting of R ureteral stones s/p IR nephrostomy tube 11/1, now presents after decreased NT output and outpatient films showing tube dislodgement.    Pt admitted 11/1-11/7/19 for HALEY and oliguria, f/w severe R hydro 2/2 R ureteral stones and +UTI.  s/p IR NT placement 11/1, course c/b septic shock 2/2 ecoli bacteremia requiring micu/pressors. Pt tx with IV cefepime transitioned to Levaquin for pansensitive ecoli.     VS: 98.3, 86, 122/76, 18, 98% RA.  Labs: leukocytosis 10.78 that has downtrended from prior, chronic stable anemia, plt 468, rest cbc unrevealing, coags wnl, hemolyzed hyperK x 2 (6.1, 5.4), downtrended elevated Scr 1.94 downtrended from recent admission 3.52. XR KUB shows tip of NT overlying R kidney. CTAP shows dislodgement of R NT, unchanged ureteral calculi.  Seen by IR resident prior to medicine team involvement. 74 M PMH HTN, HLD, CAD s/p stent, prediabetes, congenital atrophic R kidney, previous kidney stones, recent admission for urosepsis/ecoli bacteremia in setting of R ureteral stones s/p IR nephrostomy tube 11/1, now presents after decreased NT output and outpatient films showing tube dislodgement. Pt does not recall any trauma or manual dislodgement. Today wife was assisting in cleaning site and noted some redness around site. Pt Denies pain at site. Urinating on his own since leslie pulled 2 days ago, no hematuria. +decreased output from NT x 1 day. Denies f/c. Denies abd/flank pain. Denies n/v/d/freq. Pt planned for stone surgery 12/10/19. Pt took last dose of Levaquin for 14 day course of abx, today.      Pt admitted 11/1-11/7/19 for HALEY and oliguria, f/w severe R hydro 2/2 R ureteral stones and +UTI.  s/p IR NT placement 11/1, course c/b septic shock 2/2 ecoli bacteremia requiring micu/pressors. Pt tx with IV cefepime transitioned to Levaquin for pansensitive ecoli.     VS: 98.3, 86, 122/76, 18, 98% RA.  Labs: leukocytosis 10.78 that has downtrended from prior, chronic stable anemia, plt 468, rest cbc unrevealing, coags wnl, hemolyzed hyperK x 2 (6.1, 5.4), downtrended elevated Scr 1.94 downtrended from recent admission 3.52. XR KUB shows tip of NT overlying R kidney. CTAP shows dislodgement of R NT, unchanged ureteral calculi.  Seen by IR resident prior to medicine team involvement.

## 2019-11-14 NOTE — PHYSICAL EXAM
[Normal Appearance] : normal appearance [General Appearance - In No Acute Distress] : no acute distress [Abdomen Soft] : soft [Abdomen Tenderness] : non-tender [Costovertebral Angle Tenderness] : no ~M costovertebral angle tenderness [Urinary Bladder Findings] : the bladder was normal on palpation

## 2019-11-14 NOTE — ED PROVIDER NOTE - CLINICAL SUMMARY MEDICAL DECISION MAKING FREE TEXT BOX
SANTOS Goldsmith MD: Pt is a 73 y/o male with PMHx HTN, HLD, CAD s/p stent, hypothyroidism, congenital atrophic R kidney, recent admission to [facility] from 11/1 - 11/7 for urosepsis/septic shock 2/2 R nephrolithiasis s/p IR placed R nephrostomy tube on 11/1 presents to the ED c/o dislodged nephrostomy tube. Pt states did not have output from the tube since yesterday. Went to urologist office today, had xrays done and was told the tube is dislodged. Geronimo catheter removed 2 days ago, pt able to void since then. Denies pain around tube side, abdominal pain, n/v/d, dysuria, flank pain, fever/chills, hematuria. Ddx includes, however, is not limited to: Dislodged neph tube, urologic process. Plan: basic labs, CTAP, Urology consult, IR consult for tube check/replacement

## 2019-11-14 NOTE — ED ADULT NURSE NOTE - NSIMPLEMENTINTERV_GEN_ALL_ED
Implemented All Universal Safety Interventions:  [place] to call system. Call bell, personal items and telephone within reach. Instruct patient to call for assistance. Room bathroom lighting operational. Non-slip footwear when patient is off stretcher. Physically safe environment: no spills, clutter or unnecessary equipment. Stretcher in lowest position, wheels locked, appropriate side rails in place.

## 2019-11-14 NOTE — CONSULT NOTE ADULT - ASSESSMENT
dislodged PCN      -NPO after midnight  -IR to replace nephrostomy tube tomorrow  -pt scheduled for definitive stone procedure Dec 10, 2019  -follow up with urologist Brian Cruz MD Very pleasant 74 year old gentleman with dislodged PCN, large obstructing right ureteral stone    -labs reviewed  -CT images reviewed  -antegrade study reviewed  -NPO after midnight  -IR to replace nephrostomy tube tomorrow  -pt scheduled for definitive stone procedure for antegrade ureteroscopy  -Discussed with house staff

## 2019-11-14 NOTE — HISTORY OF PRESENT ILLNESS
[FreeTextEntry1] : cc pcn not draining \par 75 yo male s/p right pcn for sepsis and right ureteral stone \par reports minimal drainage from pcn since yesterday \par had leslie removed yesterday voiding well\par no flank pain no fever\par

## 2019-11-14 NOTE — H&P ADULT - NSHPREVIEWOFSYSTEMS_GEN_ALL_CORE
REVIEW OF SYSTEMS:  CONSTITUTIONAL: No weakness. No fevers. No chills. No weight loss. Good appetite.  EYES: No visual changes. No eye pain.  ENT: No hearing difficulty. No vertigo. No dysphagia. No Sinusitis/rhinorrhea.  NECK: No pain. No stiffness/rigidity.  CARDIAC: No chest pain. No palpitations.  RESPIRATORY: No cough. No SOB. No hemoptysis.  GASTROINTESTINAL: No abdominal pain. No nausea. No vomiting. No hematemesis. No diarrhea. No constipation. No melena. No hematochezia.  GENITOURINARY: No dysuria. No frequency. No hesitancy. No hematuria.  NEUROLOGICAL: No numbness. No focal weakness. No incontinence. No headache.  BACK: No back pain.  EXTREMITIES: No lower extremity edema. Full ROM.  SKIN: No rashes. No itching. No other lesions.  PSYCHIATRIC: No depression. No anxiety. No SI/HI.  ALLERGIC: No lip swelling. No hives.  All other review of systems is negative unless indicated above.  [  ] Unable to assess ROS because

## 2019-11-14 NOTE — H&P ADULT - PROBLEM SELECTOR PLAN 1
-confirmed on CTAP  -afebrile, mild reactive leukocytosis, nontoxic appearing, nonoliguric, no TTP or urinary sx.   -observe off abx as pt just finished 2 wk course, unless febrile  -NPO p MN for IR replacement of NT  -urology f/u

## 2019-11-15 ENCOUNTER — TRANSCRIPTION ENCOUNTER (OUTPATIENT)
Age: 74
End: 2019-11-15

## 2019-11-15 VITALS
TEMPERATURE: 97 F | DIASTOLIC BLOOD PRESSURE: 67 MMHG | OXYGEN SATURATION: 96 % | HEART RATE: 69 BPM | RESPIRATION RATE: 18 BRPM | SYSTOLIC BLOOD PRESSURE: 106 MMHG

## 2019-11-15 LAB
ALBUMIN SERPL ELPH-MCNC: 3.4 G/DL — SIGNIFICANT CHANGE UP (ref 3.3–5)
ALP SERPL-CCNC: 93 U/L — SIGNIFICANT CHANGE UP (ref 40–120)
ALT FLD-CCNC: 14 U/L — SIGNIFICANT CHANGE UP (ref 10–45)
ANION GAP SERPL CALC-SCNC: 10 MMOL/L — SIGNIFICANT CHANGE UP (ref 5–17)
AST SERPL-CCNC: 14 U/L — SIGNIFICANT CHANGE UP (ref 10–40)
BASOPHILS # BLD AUTO: 0.13 K/UL — SIGNIFICANT CHANGE UP (ref 0–0.2)
BASOPHILS NFR BLD AUTO: 1.4 % — SIGNIFICANT CHANGE UP (ref 0–2)
BILIRUB SERPL-MCNC: 0.5 MG/DL — SIGNIFICANT CHANGE UP (ref 0.2–1.2)
BUN SERPL-MCNC: 15 MG/DL — SIGNIFICANT CHANGE UP (ref 7–23)
CALCIUM SERPL-MCNC: 9.1 MG/DL — SIGNIFICANT CHANGE UP (ref 8.4–10.5)
CHLORIDE SERPL-SCNC: 107 MMOL/L — SIGNIFICANT CHANGE UP (ref 96–108)
CO2 SERPL-SCNC: 25 MMOL/L — SIGNIFICANT CHANGE UP (ref 22–31)
CREAT SERPL-MCNC: 2.07 MG/DL — HIGH (ref 0.5–1.3)
EOSINOPHIL # BLD AUTO: 0.12 K/UL — SIGNIFICANT CHANGE UP (ref 0–0.5)
EOSINOPHIL NFR BLD AUTO: 1.3 % — SIGNIFICANT CHANGE UP (ref 0–6)
GLUCOSE SERPL-MCNC: 93 MG/DL — SIGNIFICANT CHANGE UP (ref 70–99)
HCT VFR BLD CALC: 36.8 % — LOW (ref 39–50)
HGB BLD-MCNC: 11.1 G/DL — LOW (ref 13–17)
IMM GRANULOCYTES NFR BLD AUTO: 0.5 % — SIGNIFICANT CHANGE UP (ref 0–1.5)
LYMPHOCYTES # BLD AUTO: 1.46 K/UL — SIGNIFICANT CHANGE UP (ref 1–3.3)
LYMPHOCYTES # BLD AUTO: 15.7 % — SIGNIFICANT CHANGE UP (ref 13–44)
MAGNESIUM SERPL-MCNC: 1.8 MG/DL — SIGNIFICANT CHANGE UP (ref 1.6–2.6)
MCHC RBC-ENTMCNC: 26.4 PG — LOW (ref 27–34)
MCHC RBC-ENTMCNC: 30.2 GM/DL — LOW (ref 32–36)
MCV RBC AUTO: 87.4 FL — SIGNIFICANT CHANGE UP (ref 80–100)
MONOCYTES # BLD AUTO: 0.83 K/UL — SIGNIFICANT CHANGE UP (ref 0–0.9)
MONOCYTES NFR BLD AUTO: 8.9 % — SIGNIFICANT CHANGE UP (ref 2–14)
NEUTROPHILS # BLD AUTO: 6.72 K/UL — SIGNIFICANT CHANGE UP (ref 1.8–7.4)
NEUTROPHILS NFR BLD AUTO: 72.2 % — SIGNIFICANT CHANGE UP (ref 43–77)
PHOSPHATE SERPL-MCNC: 3.1 MG/DL — SIGNIFICANT CHANGE UP (ref 2.5–4.5)
PLATELET # BLD AUTO: 413 K/UL — HIGH (ref 150–400)
POTASSIUM SERPL-MCNC: 4.7 MMOL/L — SIGNIFICANT CHANGE UP (ref 3.5–5.3)
POTASSIUM SERPL-SCNC: 4.7 MMOL/L — SIGNIFICANT CHANGE UP (ref 3.5–5.3)
PROT SERPL-MCNC: 6.2 G/DL — SIGNIFICANT CHANGE UP (ref 6–8.3)
RBC # BLD: 4.21 M/UL — SIGNIFICANT CHANGE UP (ref 4.2–5.8)
RBC # FLD: 16.9 % — HIGH (ref 10.3–14.5)
SODIUM SERPL-SCNC: 142 MMOL/L — SIGNIFICANT CHANGE UP (ref 135–145)
WBC # BLD: 9.31 K/UL — SIGNIFICANT CHANGE UP (ref 3.8–10.5)
WBC # FLD AUTO: 9.31 K/UL — SIGNIFICANT CHANGE UP (ref 3.8–10.5)

## 2019-11-15 PROCEDURE — 85730 THROMBOPLASTIN TIME PARTIAL: CPT

## 2019-11-15 PROCEDURE — 86901 BLOOD TYPING SEROLOGIC RH(D): CPT

## 2019-11-15 PROCEDURE — C1769: CPT

## 2019-11-15 PROCEDURE — 80053 COMPREHEN METABOLIC PANEL: CPT

## 2019-11-15 PROCEDURE — 83735 ASSAY OF MAGNESIUM: CPT

## 2019-11-15 PROCEDURE — 74018 RADEX ABDOMEN 1 VIEW: CPT

## 2019-11-15 PROCEDURE — 86900 BLOOD TYPING SEROLOGIC ABO: CPT

## 2019-11-15 PROCEDURE — 99223 1ST HOSP IP/OBS HIGH 75: CPT

## 2019-11-15 PROCEDURE — 99285 EMERGENCY DEPT VISIT HI MDM: CPT

## 2019-11-15 PROCEDURE — C1729: CPT

## 2019-11-15 PROCEDURE — 86850 RBC ANTIBODY SCREEN: CPT

## 2019-11-15 PROCEDURE — 50435 EXCHANGE NEPHROSTOMY CATH: CPT | Mod: RT

## 2019-11-15 PROCEDURE — 85027 COMPLETE CBC AUTOMATED: CPT

## 2019-11-15 PROCEDURE — 84100 ASSAY OF PHOSPHORUS: CPT

## 2019-11-15 PROCEDURE — 99239 HOSP IP/OBS DSCHRG MGMT >30: CPT

## 2019-11-15 PROCEDURE — 93005 ELECTROCARDIOGRAM TRACING: CPT

## 2019-11-15 PROCEDURE — 74176 CT ABD & PELVIS W/O CONTRAST: CPT

## 2019-11-15 PROCEDURE — C1887: CPT

## 2019-11-15 PROCEDURE — 85610 PROTHROMBIN TIME: CPT

## 2019-11-15 PROCEDURE — 50435 EXCHANGE NEPHROSTOMY CATH: CPT

## 2019-11-15 RX ORDER — CHLORHEXIDINE GLUCONATE 213 G/1000ML
1 SOLUTION TOPICAL DAILY
Refills: 0 | Status: DISCONTINUED | OUTPATIENT
Start: 2019-11-15 | End: 2019-11-15

## 2019-11-15 RX ADMIN — PANTOPRAZOLE SODIUM 40 MILLIGRAM(S): 20 TABLET, DELAYED RELEASE ORAL at 05:48

## 2019-11-15 RX ADMIN — Medication 112 MICROGRAM(S): at 05:49

## 2019-11-15 NOTE — PROGRESS NOTE ADULT - PROBLEM SELECTOR PLAN 1
-replaced by IR  -continue to monitor output  -needs VNS to be set up  -f/u with Dr. Cruz as an outpt for urology

## 2019-11-15 NOTE — DISCHARGE NOTE PROVIDER - CARE PROVIDER_API CALL
Thad Ewing)  Internal Medicine  [address], [address] [place] [address]  Phone: [phone]  Fax: [phone]  Follow Up Time:     Brian Cruz)  Urology  [address], [address] [place] [address]  Phone: [phone]  Fax: [phone]  Follow Up Time:

## 2019-11-15 NOTE — PROGRESS NOTE ADULT - PROBLEM SELECTOR PLAN 4
Transitions of Care Status:  1.  Name of PCP: urologist Dr. Cruz  2.  PCP Contacted on Admission: [ ] Y    [x ] N    3.  PCP contacted at Discharge: [x ] Y    [ ] N    [ ] N/A  4.  Post-Discharge Appointment Date and Location:  5.  Summary of Handoff given to PCP: yes

## 2019-11-15 NOTE — DISCHARGE NOTE NURSING/CASE MANAGEMENT/SOCIAL WORK - PATIENT PORTAL LINK FT
You can access the FollowMyHealth Patient Portal offered by [facility] by registering at the following website: [email or web address]. By joining [facility]’s FollowMyHealth portal, you will also be able to view your health information using other applications (apps) compatible with our system.

## 2019-11-15 NOTE — CHART NOTE - NSCHARTNOTEFT_GEN_A_CORE
Interventional Radiology     pt should have the Right nephrostomy tube dressing maintained at all times.  Monitor and record daily outputs.  The tube can be flushed every other day with 5 ml sterile normal saline.   VNS service can be arranged and should be able to provide the pt with supplies for dressings and for flushes.      NNEKA Anthony  [facility] [id number]  Ext [phone] Interventional Radiology     pt should have the Right nephrostomy tube dressing maintained at all times.  Monitor and record daily outputs.  The tube can be flushed every other day with 5 ml sterile normal saline.   VNS service can be arranged and should be able to provide the pt with supplies for dressings and for flushes.  Pt is planned for f/u with Urology for stone removal.     NNEKA Anthony  [facility] [address]  Ext [phone]

## 2019-11-15 NOTE — DISCHARGE NOTE PROVIDER - CARE PROVIDERS DIRECT ADDRESSES
[email or web address]

## 2019-11-15 NOTE — PROGRESS NOTE ADULT - SUBJECTIVE AND OBJECTIVE BOX
Interventional Radiology     74y Male with PMH as below with h/o obstructive uropathy s/p Right 8.5 F Nephrostomy tube placement on 11/1//19.  Obstruction secondary to distal right obstructive ureteral stone.       < from: IR Procedure (11.01.19 @ 18:02) >  IMPRESSION: SUCCESSFUL PLACEMENT OF RIGHT 8.5 English NEPHROSTOMY.    < end of copied text >    < from: CT Abdomen and Pelvis No Cont (11.14.19 @ 18:35) >  KIDNEYS/URETERS:Right nephrostomy tube with pigtail tip terminating   outside of the collecting system. Atrophic right kidney. High density   within the right proximal collecting system may be related to contrast.   Bilateral renal cysts. 1.2 cm and 0.5 cm renal calculi in the distal   right ureter, in similar position to prior exam 10/31/2019.    < end of copied text >    PAST MEDICAL & SURGICAL HISTORY:  Borderline diabetes  Hyperlipidemia  Obesity (BMI 30-39.9)  CAD (coronary artery disease)  Hypothyroid  Hypertension  After cataract, bilateral  S/P hernia repair: left inguinal  History of kidney stones  H/O total knee replacement, left  H/O heart artery stent: 2013    Allergies  No Known Allergies    Labs:                        11.1   9.31  )-----------( 413      ( 15 Nov 2019 09:47 )             36.8     11-15    142  |  107  |  15  ----------------------------<  93  4.7   |  25  |  2.07<H>    Ca    9.1      15 Nov 2019 07:16  Phos  3.1     11-15  Mg     1.8     11-15    TPro  6.2  /  Alb  3.4  /  TBili  0.5  /  DBili  x   /  AST  14  /  ALT  14  /  AlkPhos  93  11-15    PT/INR - ( 14 Nov 2019 17:37 )   PT: 12.1 sec;   INR: 1.05 ratio    PTT - ( 14 Nov 2019 17:37 )  PTT:28.4 sec    After risks, benefits, alternatives discussion the pt verbalized understanding and signed informed consent.  Will plan for right nephrostogram with exchange of right nephrostomy tube.      Carmelo Perez, NNEKA  [facility] [address]  [phone] Interventional Radiology     74y Male with PMH as below with h/o obstructive uropathy s/p Right 8.5 F Nephrostomy tube placement on 11/1//19.  Obstruction secondary to distal right obstructive ureteral stone.       < from: IR Procedure (11.01.19 @ 18:02) >  IMPRESSION: SUCCESSFUL PLACEMENT OF RIGHT 8.5 Estonian NEPHROSTOMY.    < end of copied text >    < from: CT Abdomen and Pelvis No Cont (11.14.19 @ 18:35) >  KIDNEYS/URETERS:Right nephrostomy tube with pigtail tip terminating   outside of the collecting system. Atrophic right kidney. High density   within the right proximal collecting system may be related to contrast.   Bilateral renal cysts. 1.2 cm and 0.5 cm renal calculi in the distal   right ureter, in similar position to prior exam 10/31/2019.    < end of copied text >    PAST MEDICAL & SURGICAL HISTORY:  Borderline diabetes  Hyperlipidemia  Obesity (BMI 30-39.9)  CAD (coronary artery disease)  Hypothyroid  Hypertension  After cataract, bilateral  S/P hernia repair: left inguinal  History of kidney stones  H/O total knee replacement, left  H/O heart artery stent: 2013    Allergies  No Known Allergies    Labs:                        11.1   9.31  )-----------( 413      ( 15 Nov 2019 09:47 )             36.8     11-15    142  |  107  |  15  ----------------------------<  93  4.7   |  25  |  2.07<H>    Ca    9.1      15 Nov 2019 07:16  Phos  3.1     11-15  Mg     1.8     11-15    TPro  6.2  /  Alb  3.4  /  TBili  0.5  /  DBili  x   /  AST  14  /  ALT  14  /  AlkPhos  93  11-15    PT/INR - ( 14 Nov 2019 17:37 )   PT: 12.1 sec;   INR: 1.05 ratio    PTT - ( 14 Nov 2019 17:37 )  PTT:28.4 sec    After risks, benefits, alternatives discussion the pt verbalized understanding and signed informed consent.  Will plan for right nephrostogram with possible recannulization of the tract and possible exchange of right nephrostomy tube.      Carmelo Perez, MACKENZIE-C  [facility] [address]  [address] [phone]

## 2019-11-15 NOTE — DISCHARGE NOTE PROVIDER - HOSPITAL COURSE
74 M PMH HTN, HLD, CAD s/p stent, prediabetes, congenital atrophic R kidney, previous kidney stones, recent admission for urosepsis/ecoli bacteremia in setting of R ureteral stones s/p IR nephrostomy tube 11/1, now presents after decreased NT output and outpatient films showing tube dislodgement.    Problem: Nephrostomy tube displaced.  Plan: -replaced by IR    f/u with Dr. Cruz as an outpt for urology.    DCP with med rec discussed with Dr Cornelius PT

## 2019-11-15 NOTE — PROGRESS NOTE ADULT - SUBJECTIVE AND OBJECTIVE BOX
Patient is a 74y old  Male who presents with a chief complaint of dislodged nephrostomy tube (15 Nov 2019 12:36)    SUBJECTIVE / OVERNIGHT EVENTS: no acute events overnight, pt is waiting for IR     MEDICATIONS  (STANDING):  atorvastatin 20 milliGRAM(s) Oral at bedtime  chlorhexidine 4% Liquid 1 Application(s) Topical daily  influenza   Vaccine 0.5 milliLiter(s) IntraMuscular once  levothyroxine 112 MICROGram(s) Oral daily  pantoprazole    Tablet 40 milliGRAM(s) Oral before breakfast    MEDICATIONS  (PRN):  acetaminophen   Tablet .. 650 milliGRAM(s) Oral every 6 hours PRN Temp greater or equal to 38C (100.4F), Mild Pain (1 - 3), Moderate Pain (4 - 6), Severe Pain (7 - 10)      Vital Signs Last 24 Hrs  T(C): 36.3 (15 Nov 2019 13:33), Max: 36.9 (14 Nov 2019 21:27)  T(F): 97.4 (15 Nov 2019 13:33), Max: 98.4 (14 Nov 2019 21:27)  HR: 85 (15 Nov 2019 13:33) (74 - 86)  BP: 135/86 (15 Nov 2019 13:33) (117/64 - 154/80)  BP(mean): --  RR: 18 (15 Nov 2019 13:33) (17 - 18)  SpO2: 95% (15 Nov 2019 13:33) (95% - 99%)  CAPILLARY BLOOD GLUCOSE        I&O's Summary      PHYSICAL EXAM:  GENERAL: NAD  EYES: conjunctiva and sclera clear  NECK: Supple, No JVD  CHEST/LUNG: Clear to auscultation bilaterally; No wheeze  HEART: +S1/S2, reg   ABDOMEN: Soft, Nontender, Nondistended; Bowel sounds present  EXTREMITIES: no edema noted   PSYCH: AAOx3      LABS:                        11.1   9.31  )-----------( 413      ( 15 Nov 2019 09:47 )             36.8     11-15    142  |  107  |  15  ----------------------------<  93  4.7   |  25  |  2.07<H>    Ca    9.1      15 Nov 2019 07:16  Phos  3.1     11-15  Mg     1.8     11-15    TPro  6.2  /  Alb  3.4  /  TBili  0.5  /  DBili  x   /  AST  14  /  ALT  14  /  AlkPhos  93  11-15    PT/INR - ( 14 Nov 2019 17:37 )   PT: 12.1 sec;   INR: 1.05 ratio         PTT - ( 14 Nov 2019 17:37 )  PTT:28.4 sec

## 2019-11-15 NOTE — PROGRESS NOTE ADULT - ASSESSMENT
Very pleasant 74 year old gentleman with dislodged PCN, large obstructing right ureteral stone    - Continue NPO pending NT placement on right.  - Patient may be discharged to home from urology perspective following NT placement.  - Patient to follow up with Dr. Cruz for intervention following NT insertion.  - Please contact urology for future questions/concerns.

## 2019-11-15 NOTE — PROGRESS NOTE ADULT - SUBJECTIVE AND OBJECTIVE BOX
Interventional Radiology Procedure Note    Procedure: Right Nephrostogram with tract recannulization and replacement of right nephrostomy tube.     Indication: Obstructive uropathy, ureteral stones, s/p dislodged right nephrostomy tube     Operators: GLORIA Perez, Dr. Paulo Mckeon.     Anesthesia (type): Local (Lidocaine 2%)     Contrast: 20 ml Omnipaque 240 was uneventfully administered.     EBL: None.     Findings/Follow up Plan of Care: Right nephrostogram demonstrated tract to the right renal pelvis.  Using a 0.035 wire and 5F Berenstein catheter the tract was recanalized and a new 8.5 F MPD catheter was advanced into the Right renal pelvis.  The new right nephrostomy tube's position was confirmed in the renal pelvis with contrast injection. pt tolerated the procedure well.  Hemostasis secure and VSS.  Dressing applied over the site is C/D/I.     Specimens Removed:  None.     Implants: 8.5 F MPD drainage catheter.      Complications: None.     Condition/Disposition:  Pt tolerated the procedure well.  Pt was sent back to his inpatient room in stable condition.      Please call Interventional Radiology x [phone] with any questions, concerns, or issues.

## 2019-11-15 NOTE — DISCHARGE NOTE PROVIDER - NSDCFUSCHEDAPPT_GEN_ALL_CORE_FT
MARIN GARCIA ; 12/03/2019 ; NPP Cardio [phone] [facility]  MARIN GARCIA ; 12/09/2019 ; NPP Urology [id number]  MARIN GARCIA ; 12/23/2019 ; NPP Med [phone] [facility]

## 2019-11-15 NOTE — DISCHARGE NOTE PROVIDER - NSDCCPCAREPLAN_GEN_ALL_CORE_FT
PRINCIPAL DISCHARGE DIAGNOSIS  Diagnosis: Nephrostomy tube displaced  Assessment and Plan of Treatment: Nephrostomy tube replaced in IR and functioning   Follow up with Outpatient Vascular

## 2019-11-15 NOTE — PROGRESS NOTE ADULT - REASON FOR ADMISSION
dislodged nephrostomy tube

## 2019-11-15 NOTE — DISCHARGE NOTE PROVIDER - NSDCMRMEDTOKEN_GEN_ALL_CORE_FT
atorvastatin 20 mg oral tablet: 1 tab(s) orally once a day (at bedtime)  levothyroxine 137 mcg (0.137 mg) oral tablet: 1 tab(s) orally once a day  omeprazole 40 mg oral delayed release capsule: 1 cap(s) orally once a day  Tylenol Extra Strength 500 mg oral tablet: 2 tab(s) orally , As Needed

## 2019-11-15 NOTE — PROGRESS NOTE ADULT - SUBJECTIVE AND OBJECTIVE BOX
Subjective:  Patient with dislodged NT, awaiting NT placement by IR.    Objectives:  T(C): 36.9 (11-15-19 @ 08:16), Max: 36.9 (11-14-19 @ 21:27)  HR: 76 (11-15-19 @ 08:16) (74 - 86)  BP: 154/80 (11-15-19 @ 08:16) (117/64 - 154/80)  RR: 18 (11-15-19 @ 08:16) (18 - 18)  SpO2: 98% (11-15-19 @ 08:16) (96% - 98%)  Wt(kg): --      Physcial Exam  GENERAL: NAD, well-developed  ABDOMEN: Soft, Nontender, Nondistended  GENITOURINARY: No drainage from right NT.     LABS:                        11.8   10.78 )-----------( 468      ( 14 Nov 2019 17:37 )             38.5     11-15    142  |  107  |  15  ----------------------------<  93  4.7   |  25  |  2.07<H>    Ca    9.1      15 Nov 2019 07:16  Phos  3.1     11-15  Mg     1.8     11-15    TPro  6.2  /  Alb  3.4  /  TBili  0.5  /  DBili  x   /  AST  14  /  ALT  14  /  AlkPhos  93  11-15    CAPILLARY BLOOD GLUCOSE        PT/INR - ( 14 Nov 2019 17:37 )   PT: 12.1 sec;   INR: 1.05 ratio

## 2019-11-19 ENCOUNTER — OTHER (OUTPATIENT)
Age: 74
End: 2019-11-19

## 2019-11-19 ENCOUNTER — TRANSCRIPTION ENCOUNTER (OUTPATIENT)
Age: 74
End: 2019-11-19

## 2019-12-02 ENCOUNTER — OUTPATIENT (OUTPATIENT)
Dept: OUTPATIENT SERVICES | Facility: HOSPITAL | Age: 74
LOS: 1 days | End: 2019-12-02
Payer: MEDICARE

## 2019-12-02 VITALS
SYSTOLIC BLOOD PRESSURE: 149 MMHG | WEIGHT: 197.98 LBS | HEIGHT: 64 IN | TEMPERATURE: 98 F | HEART RATE: 80 BPM | RESPIRATION RATE: 18 BRPM | OXYGEN SATURATION: 100 % | DIASTOLIC BLOOD PRESSURE: 96 MMHG

## 2019-12-02 DIAGNOSIS — N20.1 CALCULUS OF URETER: ICD-10-CM

## 2019-12-02 DIAGNOSIS — Z96.652 PRESENCE OF LEFT ARTIFICIAL KNEE JOINT: Chronic | ICD-10-CM

## 2019-12-02 DIAGNOSIS — I25.10 ATHEROSCLEROTIC HEART DISEASE OF NATIVE CORONARY ARTERY WITHOUT ANGINA PECTORIS: ICD-10-CM

## 2019-12-02 DIAGNOSIS — Z98.89 OTHER SPECIFIED POSTPROCEDURAL STATES: Chronic | ICD-10-CM

## 2019-12-02 DIAGNOSIS — Z87.442 PERSONAL HISTORY OF URINARY CALCULI: Chronic | ICD-10-CM

## 2019-12-02 DIAGNOSIS — Z95.5 PRESENCE OF CORONARY ANGIOPLASTY IMPLANT AND GRAFT: Chronic | ICD-10-CM

## 2019-12-02 DIAGNOSIS — Z01.818 ENCOUNTER FOR OTHER PREPROCEDURAL EXAMINATION: ICD-10-CM

## 2019-12-02 DIAGNOSIS — Z90.49 ACQUIRED ABSENCE OF OTHER SPECIFIED PARTS OF DIGESTIVE TRACT: Chronic | ICD-10-CM

## 2019-12-02 DIAGNOSIS — H26.40 UNSPECIFIED SECONDARY CATARACT: Chronic | ICD-10-CM

## 2019-12-02 PROCEDURE — G0463: CPT

## 2019-12-02 NOTE — H&P PST ADULT - HISTORY OF PRESENT ILLNESS
74year old male with pmhx of hypertension, hyperlipidemia, CAD, prediabetic and hypothyroidism presents with c/o dizziness when he call for emergency assistance on 10/30. Patient was admitted in hospital with final diagnosis of calculi of ureter. Patient denies any fever, nausea or hematuria. Patient is here today for presurgical testing for scheduled cystoscopy, right ureteroscopy, with laser lithotripsy, stone extraction, right stent placement, nephrostomy tube removal , possible right percutaneous  stone extraction on 12/9/19 74year old male with pmhx of hypertension, hyperlipidemia, CAD, prediabetic and hypothyroidism presents with c/o dizziness when he call for emergency assistance on 10/30. Patient was admitted in hospital with final diagnosis of calculi of ureter. Patient denies any fever, nausea or hematuria. Patient is here today for presurgical testing for scheduled cystoscopy, right ureteroscopy, with laser lithotripsy, stone extraction, right stent placement, nephrostomy tube removal, possible right percutaneous stone extraction on 12/9/19

## 2019-12-02 NOTE — H&P PST ADULT - NSICDXPASTMEDICALHX_GEN_ALL_CORE_FT
PAST MEDICAL HISTORY:  Borderline diabetes     CAD (coronary artery disease)     Cataract     Cholecystitis     Hyperlipidemia     Hypertension     Hypothyroid     Obesity (BMI 30-39.9)     Osteoarthritis

## 2019-12-02 NOTE — H&P PST ADULT - NS MD HP INPLANTS MED DEV
left knee and 1 coronary artery stent/Artificial joint Left knee and 1 coronary artery stent/Artificial joint

## 2019-12-02 NOTE — H&P PST ADULT - NSICDXPASTSURGICALHX_GEN_ALL_CORE_FT
PAST SURGICAL HISTORY:  After cataract, bilateral     H/O heart artery stent x 1 in 2013    H/O total knee replacement, left     History of cholecystectomy     History of kidney stones     S/P hernia repair left inguinal

## 2019-12-02 NOTE — H&P PST ADULT - NSICDXPROBLEM_GEN_ALL_CORE_FT
PROBLEM DIAGNOSES  Problem: Calculus of ureter  Assessment and Plan: Preoperative instructions given to patient with understanding verbalized for scheduled PROBLEM DIAGNOSES  Problem: CAD (coronary artery disease)  Assessment and Plan: Patient reported he had stopped taking Aspirin as directed after discharge from hospital. Discussed case with  as patient has 1 coroanry stent placed in 2010. he recommended to have patient restart Aspirin today and skip the morning of surgery. Patient instructed as recommended, agreed with plan of care and verbalized understanding     Problem: Calculus of ureter  Assessment and Plan: Preoperative instructions given to patient with understanding verbalized for scheduled cystoscopy, right uretereoscopy with laser lithotripsy, stone extraction, right stent placement, nephrostomy tube removal, possible right percutaneous stone extraction on 12/9/19

## 2019-12-02 NOTE — H&P PST ADULT - NSANTHOSAYNRD_GEN_A_CORE
No. OLIVIA screening performed.  STOP BANG Legend: 0-2 = LOW Risk; 3-4 = INTERMEDIATE Risk; 5-8 = HIGH Risk

## 2019-12-04 ENCOUNTER — APPOINTMENT (OUTPATIENT)
Dept: UROLOGY | Facility: CLINIC | Age: 74
End: 2019-12-04
Payer: MEDICARE

## 2019-12-04 VITALS
HEIGHT: 64 IN | WEIGHT: 200 LBS | BODY MASS INDEX: 34.15 KG/M2 | DIASTOLIC BLOOD PRESSURE: 87 MMHG | SYSTOLIC BLOOD PRESSURE: 139 MMHG | HEART RATE: 95 BPM

## 2019-12-04 PROBLEM — M19.90 UNSPECIFIED OSTEOARTHRITIS, UNSPECIFIED SITE: Chronic | Status: ACTIVE | Noted: 2019-12-02

## 2019-12-04 PROBLEM — H26.9 UNSPECIFIED CATARACT: Chronic | Status: ACTIVE | Noted: 2019-12-02

## 2019-12-04 PROBLEM — K81.9 CHOLECYSTITIS, UNSPECIFIED: Chronic | Status: ACTIVE | Noted: 2019-12-02

## 2019-12-04 PROCEDURE — 99214 OFFICE O/P EST MOD 30 MIN: CPT

## 2019-12-04 NOTE — HISTORY OF PRESENT ILLNESS
[FreeTextEntry1] : Very pleasant 74 year-old gentleman who presents for followup of right ureteral stones. Patient previously underwent right nephrostomy tube placement for obstructing distal right ureteral stones. Today, output from nephrostomy tube was noted to be scant and cloudy. He denies fevers or chills. No nausea or vomiting. He presents today for evaluation and for surgical planning. No specific timing to his symptoms. No aggravating or alleviating factors that he knows of. [Urinary Retention] : urinary retention [Hematuria - Gross] : no gross hematuria [Dysuria] : no dysuria [Urinary Urgency] : no urinary urgency [Bladder Spasm] : no bladder spasm [Abdominal Pain] : no abdominal pain [Fever] : no fever [Flank Pain] : flank pain [Fatigue] : no fatigue [Nausea] : no nausea [Anorexia] : no anorexia

## 2019-12-04 NOTE — ASSESSMENT
[FreeTextEntry1] : Very pleasant 74 year old gentleman presents for followup of right ureteral stone status post nephrostomy tube placement\par -CT images again reviewed\par -Urine culture\par -Start Augmentin x14 days to be modified based off of urine culture\par -Will plan for right ureteroscopy with possible antegrade ureteroscopy if necessary next week\par -I discussed the different treatment modalities for nephrolithiasis with the patient, including medical management, spontaneous stone passage, percutaneous stone extraction, extracorporeal shock wave lithotripsy, and ureteroscopy with laser lithotripsy and stone extraction. Given the size and location of the stone, the patient opted to proceed with ureteroscopy.  The risks, benefits, and alternatives to ureteroscopy were discussed with the patient, including but not limited to pain, infection, bleeding, bladder injury, ureteral injury, renal injury, and treatment failure.  I also discussed the possible need for a temporary ureteral stent.  I discussed the possible side effects of a temporary ureteral stent, including flank pain, hematuria, and bladder spasms.  The patient understands that a stent is a temporary implant that must be removed in the future.  The patient wishes to proceed and we will schedule the surgery for the near future.

## 2019-12-04 NOTE — PHYSICAL EXAM
[General Appearance - Well Developed] : well developed [General Appearance - Well Nourished] : well nourished [Normal Appearance] : normal appearance [Well Groomed] : well groomed [General Appearance - In No Acute Distress] : no acute distress [Abdomen Tenderness] : non-tender [Abdomen Soft] : soft [Costovertebral Angle Tenderness] : no ~M costovertebral angle tenderness [FreeTextEntry1] : Right nephrostomy tube with cloudy yellow urine [Urinary Bladder Findings] : the bladder was normal on palpation [Edema] : no peripheral edema [Respiration, Rhythm And Depth] : normal respiratory rhythm and effort [] : no respiratory distress [Exaggerated Use Of Accessory Muscles For Inspiration] : no accessory muscle use [Oriented To Time, Place, And Person] : oriented to person, place, and time [Affect] : the affect was normal [Mood] : the mood was normal [Not Anxious] : not anxious [Normal Station and Gait] : the gait and station were normal for the patient's age [No Focal Deficits] : no focal deficits [No Palpable Adenopathy] : no palpable adenopathy

## 2019-12-05 RX ORDER — LEVOTHYROXINE SODIUM 0.14 MG/1
137 TABLET ORAL
Qty: 90 | Refills: 0 | Status: DISCONTINUED | COMMUNITY
Start: 2019-08-06 | End: 2019-12-05

## 2019-12-05 NOTE — ASSESSMENT
[FreeTextEntry1] : 74 year old male found to have stable Essential Hypertension, CAD, Chronic Renal Failure stage -2, Hypercholesterolemia with Hypertriglyceridemia, Acquired Hypothyroidism, SDH, with the current regimen, diet and life style modifications, as counseled. Prior results reviewed and discussed with the patient during today's examination. Plan as ordered.\par Patient was recently hospitalized, findings of Right Hydronephrosis/ Septic Shock and recommendations reviewed and discussed with the patient during today's examination.\par Supplemental history was obtained from wife, who is accompanying the patient for today's examination.\par

## 2019-12-05 NOTE — HEALTH RISK ASSESSMENT
[No falls in past year] : Patient reported no falls in the past year [No] : In the past 12 months have you used drugs other than those required for medical reasons? No [0] : 2) Feeling down, depressed, or hopeless: Not at all (0) [de-identified] : ORTHO [] : No [[id number]] : 0

## 2019-12-05 NOTE — ADDENDUM
[FreeTextEntry1] : PST results from 11/12/19 noted, discussed with the patient.\par Levothyroxine increased to 150 MCG, 1 Tab Daily, as counseled.\par Patient is medically optimized to the best at this time for the stated intervention.\par Patient is medically cleared.\par Discussed with PST-NARCISA Luis.

## 2019-12-05 NOTE — HISTORY OF PRESENT ILLNESS
[Post-hospitalization from ___ Hospital] : Post-hospitalization from [unfilled] Hospital [Admitted on: ___] : The patient was admitted on [unfilled] [Discharged on ___] : discharged on [unfilled] [Patient Contacted By: ____] : and contacted by [unfilled] [FreeTextEntry2] : As documented in a message section.\par \par \par 74 year old  male patient with history of stable Essential Hypertension, CAD, Chronic Renal Failure stage -2, Hypercholesterolemia with Hypertriglyceridemia, Acquired Hypothyroidism, SDH, history as stated, presented for follow up examination after hospital discharge. Patient is compliant with all medications. Denies shortness of breath, chest pain or abdominal pains at this time. ROS as stated.\par

## 2019-12-08 ENCOUNTER — TRANSCRIPTION ENCOUNTER (OUTPATIENT)
Age: 74
End: 2019-12-08

## 2019-12-08 LAB — BACTERIA UR CULT: ABNORMAL

## 2019-12-09 ENCOUNTER — APPOINTMENT (OUTPATIENT)
Dept: UROLOGY | Facility: HOSPITAL | Age: 74
End: 2019-12-09

## 2019-12-09 ENCOUNTER — RESULT REVIEW (OUTPATIENT)
Age: 74
End: 2019-12-09

## 2019-12-09 ENCOUNTER — OUTPATIENT (OUTPATIENT)
Dept: OUTPATIENT SERVICES | Facility: HOSPITAL | Age: 74
LOS: 1 days | End: 2019-12-09
Payer: MEDICARE

## 2019-12-09 VITALS
OXYGEN SATURATION: 97 % | TEMPERATURE: 98 F | SYSTOLIC BLOOD PRESSURE: 149 MMHG | RESPIRATION RATE: 13 BRPM | DIASTOLIC BLOOD PRESSURE: 80 MMHG | HEART RATE: 68 BPM

## 2019-12-09 VITALS
SYSTOLIC BLOOD PRESSURE: 122 MMHG | TEMPERATURE: 98 F | WEIGHT: 197.98 LBS | HEART RATE: 92 BPM | RESPIRATION RATE: 16 BRPM | OXYGEN SATURATION: 99 % | HEIGHT: 64 IN | DIASTOLIC BLOOD PRESSURE: 73 MMHG

## 2019-12-09 DIAGNOSIS — Z96.652 PRESENCE OF LEFT ARTIFICIAL KNEE JOINT: Chronic | ICD-10-CM

## 2019-12-09 DIAGNOSIS — H26.40 UNSPECIFIED SECONDARY CATARACT: Chronic | ICD-10-CM

## 2019-12-09 DIAGNOSIS — Z90.49 ACQUIRED ABSENCE OF OTHER SPECIFIED PARTS OF DIGESTIVE TRACT: Chronic | ICD-10-CM

## 2019-12-09 DIAGNOSIS — Z98.89 OTHER SPECIFIED POSTPROCEDURAL STATES: Chronic | ICD-10-CM

## 2019-12-09 DIAGNOSIS — Z87.442 PERSONAL HISTORY OF URINARY CALCULI: Chronic | ICD-10-CM

## 2019-12-09 DIAGNOSIS — Z95.5 PRESENCE OF CORONARY ANGIOPLASTY IMPLANT AND GRAFT: Chronic | ICD-10-CM

## 2019-12-09 DIAGNOSIS — N20.1 CALCULUS OF URETER: ICD-10-CM

## 2019-12-09 PROCEDURE — 50389 REMOVE RENAL TUBE W/FLUORO: CPT | Mod: RT

## 2019-12-09 PROCEDURE — C2625: CPT

## 2019-12-09 PROCEDURE — 52356 CYSTO/URETERO W/LITHOTRIPSY: CPT | Mod: RT

## 2019-12-09 PROCEDURE — C1769: CPT

## 2019-12-09 PROCEDURE — 74420 UROGRAPHY RTRGR +-KUB: CPT | Mod: 26

## 2019-12-09 PROCEDURE — C1889: CPT

## 2019-12-09 PROCEDURE — 76000 FLUOROSCOPY <1 HR PHYS/QHP: CPT

## 2019-12-09 PROCEDURE — 88300 SURGICAL PATH GROSS: CPT

## 2019-12-09 PROCEDURE — 82365 CALCULUS SPECTROSCOPY: CPT

## 2019-12-09 PROCEDURE — 88300 SURGICAL PATH GROSS: CPT | Mod: 26

## 2019-12-09 RX ORDER — LEVOTHYROXINE SODIUM 125 MCG
1 TABLET ORAL
Qty: 0 | Refills: 0 | DISCHARGE

## 2019-12-09 RX ORDER — SODIUM CHLORIDE 9 MG/ML
3 INJECTION INTRAMUSCULAR; INTRAVENOUS; SUBCUTANEOUS EVERY 8 HOURS
Refills: 0 | Status: DISCONTINUED | OUTPATIENT
Start: 2019-12-09 | End: 2019-12-09

## 2019-12-09 RX ORDER — HYDROMORPHONE HYDROCHLORIDE 2 MG/ML
0.5 INJECTION INTRAMUSCULAR; INTRAVENOUS; SUBCUTANEOUS
Refills: 0 | Status: DISCONTINUED | OUTPATIENT
Start: 2019-12-09 | End: 2019-12-09

## 2019-12-09 RX ORDER — ONDANSETRON 8 MG/1
4 TABLET, FILM COATED ORAL EVERY 6 HOURS
Refills: 0 | Status: DISCONTINUED | OUTPATIENT
Start: 2019-12-09 | End: 2019-12-18

## 2019-12-09 RX ORDER — IBUPROFEN 200 MG
1 TABLET ORAL
Qty: 0 | Refills: 0 | DISCHARGE
Start: 2019-12-09

## 2019-12-09 RX ORDER — OMEPRAZOLE 10 MG/1
1 CAPSULE, DELAYED RELEASE ORAL
Qty: 0 | Refills: 0 | DISCHARGE

## 2019-12-09 RX ORDER — ACETAMINOPHEN 500 MG
2 TABLET ORAL
Qty: 0 | Refills: 0 | DISCHARGE

## 2019-12-09 RX ORDER — IBUPROFEN 200 MG
600 TABLET ORAL EVERY 6 HOURS
Refills: 0 | Status: DISCONTINUED | OUTPATIENT
Start: 2019-12-09 | End: 2019-12-18

## 2019-12-09 RX ORDER — SODIUM CHLORIDE 9 MG/ML
1000 INJECTION, SOLUTION INTRAVENOUS
Refills: 0 | Status: DISCONTINUED | OUTPATIENT
Start: 2019-12-09 | End: 2019-12-09

## 2019-12-09 RX ORDER — ASPIRIN/CALCIUM CARB/MAGNESIUM 324 MG
1 TABLET ORAL
Qty: 0 | Refills: 0 | DISCHARGE

## 2019-12-09 RX ORDER — ONDANSETRON 8 MG/1
4 TABLET, FILM COATED ORAL ONCE
Refills: 0 | Status: DISCONTINUED | OUTPATIENT
Start: 2019-12-09 | End: 2019-12-18

## 2019-12-09 RX ADMIN — SODIUM CHLORIDE 3 MILLILITER(S): 9 INJECTION INTRAMUSCULAR; INTRAVENOUS; SUBCUTANEOUS at 07:14

## 2019-12-09 RX ADMIN — HYDROMORPHONE HYDROCHLORIDE 0.5 MILLIGRAM(S): 2 INJECTION INTRAMUSCULAR; INTRAVENOUS; SUBCUTANEOUS at 09:50

## 2019-12-09 RX ADMIN — HYDROMORPHONE HYDROCHLORIDE 0.5 MILLIGRAM(S): 2 INJECTION INTRAMUSCULAR; INTRAVENOUS; SUBCUTANEOUS at 09:54

## 2019-12-09 NOTE — BRIEF OPERATIVE NOTE - OPERATION/FINDINGS
right ureteral stone broken up with lithotripsy and removed right ureteral stone broken up with lithotripsy, stones extracted with basket and right stent placed; nephrostomy tube removed

## 2019-12-09 NOTE — BRIEF OPERATIVE NOTE - NSICDXBRIEFPROCEDURE_GEN_ALL_CORE_FT
PROCEDURES:  Cystoscopic extraction of ureteral stone 09-Dec-2019 09:04:21  Ingrid Cordero  Lithotripsy with insertion of right ureteral stent 09-Dec-2019 09:04:11  Ingrid Cordero

## 2019-12-09 NOTE — ASU DISCHARGE PLAN (ADULT/PEDIATRIC) - CARE PROVIDER_API CALL
Brian Cruz (MD)  Urology  [address] [facility], [address] [place] [address]  Phone: [phone]  Fax: [phone]  Follow Up Time: 1 week

## 2019-12-09 NOTE — ASU PATIENT PROFILE, ADULT - PSH
After cataract, bilateral    H/O heart artery stent  x 1 in 2013  H/O total knee replacement, left    History of cholecystectomy    History of kidney stones    S/P hernia repair  left inguinal

## 2019-12-09 NOTE — ASU DISCHARGE PLAN (ADULT/PEDIATRIC) - PROCEDURE
Right ureteroscopy with laser lithotripsy stone extraction with right stent placement and nephrostomy tube removal

## 2019-12-09 NOTE — ASU PATIENT PROFILE, ADULT - PMH
Borderline diabetes    CAD (coronary artery disease)    Cataract    Cholecystitis    Hyperlipidemia    Hypertension    Hypothyroid    Obesity (BMI 30-39.9)    Osteoarthritis

## 2019-12-09 NOTE — ASU DISCHARGE PLAN (ADULT/PEDIATRIC) - ASU DC SPECIAL INSTRUCTIONSFT
Please follow-up with Dr. Cruz in the office 12/17/19; please call the office, with the provided number below, to make an appointment. You may take over the counter medications such as Tylenol or Motrin, as needed, to manage your pain. Please continue taking your prescribed antibiotics so that you finish the full course of pills.

## 2019-12-12 ENCOUNTER — TRANSCRIPTION ENCOUNTER (OUTPATIENT)
Age: 74
End: 2019-12-12

## 2019-12-14 ENCOUNTER — RX RENEWAL (OUTPATIENT)
Age: 74
End: 2019-12-14

## 2019-12-17 ENCOUNTER — TRANSCRIPTION ENCOUNTER (OUTPATIENT)
Age: 74
End: 2019-12-17

## 2019-12-17 ENCOUNTER — APPOINTMENT (OUTPATIENT)
Dept: UROLOGY | Facility: CLINIC | Age: 74
End: 2019-12-17
Payer: MEDICARE

## 2019-12-17 PROCEDURE — 52310 CYSTOSCOPY AND TREATMENT: CPT

## 2019-12-23 ENCOUNTER — APPOINTMENT (OUTPATIENT)
Dept: PULMONOLOGY | Facility: CLINIC | Age: 74
End: 2019-12-23
Payer: MEDICARE

## 2019-12-23 ENCOUNTER — APPOINTMENT (OUTPATIENT)
Dept: INTERNAL MEDICINE | Facility: CLINIC | Age: 74
End: 2019-12-23
Payer: MEDICARE

## 2019-12-23 VITALS
WEIGHT: 190 LBS | RESPIRATION RATE: 16 BRPM | BODY MASS INDEX: 32.44 KG/M2 | DIASTOLIC BLOOD PRESSURE: 72 MMHG | HEIGHT: 64 IN | HEART RATE: 90 BPM | TEMPERATURE: 98.3 F | SYSTOLIC BLOOD PRESSURE: 157 MMHG | OXYGEN SATURATION: 95 %

## 2019-12-23 VITALS
SYSTOLIC BLOOD PRESSURE: 130 MMHG | RESPIRATION RATE: 16 BRPM | BODY MASS INDEX: 32.44 KG/M2 | HEIGHT: 64 IN | WEIGHT: 190 LBS | TEMPERATURE: 98.3 F | HEART RATE: 90 BPM | DIASTOLIC BLOOD PRESSURE: 80 MMHG | OXYGEN SATURATION: 95 %

## 2019-12-23 DIAGNOSIS — Z87.891 PERSONAL HISTORY OF NICOTINE DEPENDENCE: ICD-10-CM

## 2019-12-23 PROCEDURE — 99214 OFFICE O/P EST MOD 30 MIN: CPT

## 2019-12-23 PROCEDURE — 94060 EVALUATION OF WHEEZING: CPT

## 2019-12-23 PROCEDURE — 99203 OFFICE O/P NEW LOW 30 MIN: CPT | Mod: 25

## 2019-12-23 PROCEDURE — 94727 GAS DIL/WSHOT DETER LNG VOL: CPT

## 2019-12-23 PROCEDURE — 94729 DIFFUSING CAPACITY: CPT

## 2019-12-23 NOTE — HISTORY OF PRESENT ILLNESS
[de-identified] : 74 year old  male patient with history of stable Essential Hypertension, CAD, Chronic Renal Failure stage -2, Hypercholesterolemia with Hypertriglyceridemia, Acquired Hypothyroidism, SDH, history as stated, presented for follow up examination. Patient is compliant with all medications. Denies shortness of breath, chest pain or abdominal pains at this time. ROS as stated.\par

## 2019-12-23 NOTE — ASSESSMENT
[FreeTextEntry1] : 74 year old male found to have stable Essential Hypertension, CAD, Chronic Renal Failure stage -2, Hypercholesterolemia with Hypertriglyceridemia, Acquired Hypothyroidism, SDH, with the current regimen, diet and life style modifications, as counseled. Prior results reviewed and discussed with the patient during today's examination. Plan as ordered.\par Patient was recently evaluated by PULM/URO , findings and recommendations reviewed with the patient during today's examination.\par

## 2019-12-23 NOTE — HEALTH RISK ASSESSMENT
[No] : In the past 12 months have you used drugs other than those required for medical reasons? No [No falls in past year] : Patient reported no falls in the past year [0] : 2) Feeling down, depressed, or hopeless: Not at all (0) [] : No [de-identified] : URO/PULM [[id number]] : 0

## 2019-12-26 NOTE — PHYSICAL EXAM
[General Appearance - Well Developed] : well developed [General Appearance - Well Nourished] : well nourished [Normal Oropharynx] : normal oropharynx [Neck Appearance] : the appearance of the neck was normal [Jugular Venous Distention Increased] : there was no jugular-venous distention [Neck Cervical Mass (___cm)] : no neck mass was observed [Thyroid Diffuse Enlargement] : the thyroid was not enlarged [Heart Rate And Rhythm] : heart rate and rhythm were normal [Heart Sounds] : normal S1 and S2 [Murmurs] : no murmurs present [Arterial Pulses Normal] : the arterial pulses were normal [Respiration, Rhythm And Depth] : normal respiratory rhythm and effort [Exaggerated Use Of Accessory Muscles For Inspiration] : no accessory muscle use [Auscultation Breath Sounds / Voice Sounds] : lungs were clear to auscultation bilaterally [Bowel Sounds] : normal bowel sounds [Abdomen Soft] : soft [Abdomen Tenderness] : non-tender [] : no hepato-splenomegaly [Motor Exam] : the motor exam was normal [Affect] : the affect was normal [Mood] : the mood was normal [Low Lying Soft Palate] : no low lying soft palate [Elongated Uvula] : no elongated uvula [Enlarged Base of the Tongue] : no enlargement of the base of the tongue [FreeTextEntry2] : no edema [FreeTextEntry1] : no bruit

## 2019-12-26 NOTE — DISCUSSION/SUMMARY
[FreeTextEntry1] : Dyspnea and cough with normal PFT and normal lung examination.  i suspect he has dyspnea due to deconditioning.  He has CAD, thus I will avoid empiric bronchodilator treatment.  \par I will continue to follow\par \par German Sanders MD [facility]

## 2019-12-26 NOTE — HISTORY OF PRESENT ILLNESS
[FreeTextEntry1] : 74 year old man who has had chest discomfort and dyspnea.  He is a former smoker  who stopped 20 years ago.  He has no history of obstructive lung disease. He notes dyspnea on exertion that began recently. He has had cardiology reevaluation and has had negative stress test.  Echo demonstrated mild diastolic dysfunction, grade 1. \par \par He has started to have chest discomfort at night, that resolves spontaneously.  He has intermittent dry cough.  He states he feels he has chest congestion, unable to expectorate.  \par \par He was hospitalized recently due to kidney stone.  He had CT angiogram that demonstrated several small 4 mm nodules but otherwise no pathology reported  (images not available).\par \par PMH:\par \par CAD stent in 2013  CKD\par \par \par SH\par \par former smoker for  20 years, stopped 20 years

## 2019-12-26 NOTE — REVIEW OF SYSTEMS
[Hypertension] : ~T hypertension [Cough] : cough [Fever] : no fever [Sputum] : not coughing up ~M sputum [Nasal Congestion] : no nasal congestion [Hay Fever] : no hay fever [Heartburn] : no heartburn

## 2019-12-28 LAB
ALBUMIN SERPL ELPH-MCNC: 4.1 G/DL
ALP BLD-CCNC: 110 U/L
ALT SERPL-CCNC: 25 U/L
ANION GAP SERPL CALC-SCNC: 12 MMOL/L
AST SERPL-CCNC: 19 U/L
BASOPHILS # BLD AUTO: 0.06 K/UL
BASOPHILS NFR BLD AUTO: 0.8 %
BILIRUB SERPL-MCNC: 0.6 MG/DL
BUN SERPL-MCNC: 22 MG/DL
CALCIUM SERPL-MCNC: 10.1 MG/DL
CHLORIDE SERPL-SCNC: 105 MMOL/L
CHOLEST SERPL-MCNC: 141 MG/DL
CHOLEST/HDLC SERPL: 3.7 RATIO
CO2 SERPL-SCNC: 24 MMOL/L
CREAT SERPL-MCNC: 1.82 MG/DL
EOSINOPHIL # BLD AUTO: 0.51 K/UL
EOSINOPHIL NFR BLD AUTO: 6.8 %
ESTIMATED AVERAGE GLUCOSE: 117 MG/DL
GGT SERPL-CCNC: 46 U/L
GLUCOSE SERPL-MCNC: 112 MG/DL
HBA1C MFR BLD HPLC: 5.7 %
HCT VFR BLD CALC: 42.2 %
HDLC SERPL-MCNC: 38 MG/DL
HGB BLD-MCNC: 12.8 G/DL
IMM GRANULOCYTES NFR BLD AUTO: 0.1 %
IRON SATN MFR SERPL: 29 %
IRON SERPL-MCNC: 90 UG/DL
LDLC SERPL CALC-MCNC: 72 MG/DL
LYMPHOCYTES # BLD AUTO: 1.67 K/UL
LYMPHOCYTES NFR BLD AUTO: 22.2 %
MAN DIFF?: NORMAL
MCHC RBC-ENTMCNC: 27.8 PG
MCHC RBC-ENTMCNC: 30.3 GM/DL
MCV RBC AUTO: 91.5 FL
MONOCYTES # BLD AUTO: 0.67 K/UL
MONOCYTES NFR BLD AUTO: 8.9 %
NEUTROPHILS # BLD AUTO: 4.59 K/UL
NEUTROPHILS NFR BLD AUTO: 61.2 %
PLATELET # BLD AUTO: 263 K/UL
POTASSIUM SERPL-SCNC: 4.8 MMOL/L
PROT SERPL-MCNC: 6.8 G/DL
RBC # BLD: 4.61 M/UL
RBC # FLD: 16 %
SODIUM SERPL-SCNC: 141 MMOL/L
T3 SERPL-MCNC: 113 NG/DL
T4 FREE SERPL-MCNC: 2.5 NG/DL
TIBC SERPL-MCNC: 310 UG/DL
TRIGL SERPL-MCNC: 156 MG/DL
TSH SERPL-ACNC: 0.18 UIU/ML
UIBC SERPL-MCNC: 220 UG/DL
WBC # FLD AUTO: 7.51 K/UL

## 2020-01-17 NOTE — H&P ADULT - NSCORESITESY/N_GEN_A_CORE_RD
Prior to immunization administration, verified patients identity using patient s name and date of birth. Please see Immunization Activity for additional information.     Screening Questionnaire for Adult Immunization    Are you sick today?   No   Do you have allergies to medications, food, a vaccine component or latex?   No   Have you ever had a serious reaction after receiving a vaccination?   No   Do you have a long-term health problem with heart, lung, kidney, or metabolic disease (e.g., diabetes), asthma, a blood disorder, no spleen, complement component deficiency, a cochlear implant, or a spinal fluid leak?  Are you on long-term aspirin therapy?   No   Do you have cancer, leukemia, HIV/AIDS, or any other immune system problem?   No   Do you have a parent, brother, or sister with an immune system problem?   No   In the past 3 months, have you taken medications that affect  your immune system, such as prednisone, other steroids, or anticancer drugs; drugs for the treatment of rheumatoid arthritis, Crohn s disease, or psoriasis; or have you had radiation treatments?   No   Have you had a seizure, or a brain or other nervous system problem?   No   During the past year, have you received a transfusion of blood or blood    products, or been given immune (gamma) globulin or antiviral drug?   No   For women: Are you pregnant or is there a chance you could become       pregnant during the next month?   No   Have you received any vaccinations in the past 4 weeks?   No     Immunization questionnaire answers were all negative.        Per orders of Dr. Carter, injection of MMR and Shingrix given by Yolie Lovelace. Patient instructed to remain in clinic for 15 minutes afterwards, and to report any adverse reaction to me immediately.       Screening performed by Yolie Lovelace on 1/17/2020 at 10:31 AM.     No

## 2020-01-21 ENCOUNTER — APPOINTMENT (OUTPATIENT)
Dept: UROLOGY | Facility: CLINIC | Age: 75
End: 2020-01-21
Payer: MEDICARE

## 2020-01-21 VITALS
BODY MASS INDEX: 32.44 KG/M2 | HEIGHT: 64 IN | WEIGHT: 190 LBS | HEART RATE: 89 BPM | SYSTOLIC BLOOD PRESSURE: 124 MMHG | DIASTOLIC BLOOD PRESSURE: 78 MMHG

## 2020-01-21 DIAGNOSIS — N20.1 CALCULUS OF URETER: ICD-10-CM

## 2020-01-21 PROCEDURE — 99214 OFFICE O/P EST MOD 30 MIN: CPT

## 2020-01-21 NOTE — HISTORY OF PRESENT ILLNESS
[FreeTextEntry1] : Very pleasant 74-year-old gentleman who presents for followup of kidney stones. Patient underwent right ureteroscopy with laser lithotripsy approximately one and a half months ago. This demonstrated 100% calcium oxalate monohydrate stone. He denies dysuria. No hematuria. No flank pain or suprapubic pain. No complaints. [Urinary Retention] : urinary retention [Urinary Urgency] : no urinary urgency [Dysuria] : no dysuria [Abdominal Pain] : no abdominal pain [Bladder Spasm] : no bladder spasm [Hematuria - Gross] : no gross hematuria [Flank Pain] : flank pain [Fever] : no fever [Nausea] : no nausea [Fatigue] : no fatigue [Anorexia] : no anorexia

## 2020-01-21 NOTE — ASSESSMENT
[FreeTextEntry1] : Very pleasant 74-year-old gentleman presents for followup of kidney stones\par -Results of stone analysis reviewed with the patient in detail\par -We had a detailed discussion regarding prevention strategies for kidney stones, especially regarding calcium oxalate stones\par -Follow up in 6 months with renal ultrasound

## 2020-03-02 NOTE — ED ADULT NURSE NOTE - NS ED NURSE LEVEL OF CONSCIOUSNESS ORIENTATION
Oriented - self; Oriented - place; Oriented - time [Hearing Loss] : hearing loss [Ear Noises] : ear noises [As Noted in HPI] : as noted in HPI [Negative] : Heme/Lymph

## 2020-03-03 ENCOUNTER — APPOINTMENT (OUTPATIENT)
Dept: INTERNAL MEDICINE | Facility: CLINIC | Age: 75
End: 2020-03-03
Payer: MEDICARE

## 2020-03-03 VITALS
BODY MASS INDEX: 31.41 KG/M2 | OXYGEN SATURATION: 97 % | HEIGHT: 64 IN | SYSTOLIC BLOOD PRESSURE: 120 MMHG | DIASTOLIC BLOOD PRESSURE: 68 MMHG | HEART RATE: 82 BPM | RESPIRATION RATE: 16 BRPM | TEMPERATURE: 97.8 F | WEIGHT: 184 LBS

## 2020-03-03 PROCEDURE — 99214 OFFICE O/P EST MOD 30 MIN: CPT

## 2020-03-03 RX ORDER — LEVOFLOXACIN 250 MG/1
250 TABLET, FILM COATED ORAL
Qty: 7 | Refills: 0 | Status: DISCONTINUED | COMMUNITY
Start: 2019-11-07 | End: 2020-03-03

## 2020-03-03 RX ORDER — DICLOFENAC SODIUM 10 MG/G
1 GEL TOPICAL DAILY
Qty: 1 | Refills: 2 | Status: DISCONTINUED | COMMUNITY
Start: 2019-05-31 | End: 2020-03-03

## 2020-03-03 RX ORDER — AMOXICILLIN AND CLAVULANATE POTASSIUM 875; 125 MG/1; MG/1
875-125 TABLET, COATED ORAL
Qty: 28 | Refills: 0 | Status: DISCONTINUED | COMMUNITY
Start: 2019-12-04 | End: 2020-03-03

## 2020-03-03 NOTE — ASSESSMENT
[FreeTextEntry1] : 74 year old male found to have stable Essential Hypertension, CAD, Chronic Renal Failure stage -2, Hypercholesterolemia with Hypertriglyceridemia, Acquired Hypothyroidism, SDH, with the current regimen, diet and life style modifications, as counseled. Prior results reviewed and discussed with the patient during today's examination. Plan as ordered.\par Patient was recently evaluated by URO , findings and recommendations reviewed with the patient during today's examination.\par

## 2020-03-03 NOTE — HEALTH RISK ASSESSMENT
[No] : In the past 12 months have you used drugs other than those required for medical reasons? No [No falls in past year] : Patient reported no falls in the past year [0] : 2) Feeling down, depressed, or hopeless: Not at all (0) [] : No [de-identified] : URO [[id number]] : 0

## 2020-03-03 NOTE — HISTORY OF PRESENT ILLNESS
[de-identified] : 74 year old  male patient with history of stable Essential Hypertension, CAD, Chronic Renal Failure stage -2, Hypercholesterolemia with Hypertriglyceridemia, Acquired Hypothyroidism, SDH, history as stated, presented for follow up examination. Patient is compliant with all medications. Denies shortness of breath, chest pain or abdominal pains at this time. ROS as stated.\par

## 2020-03-06 LAB
ALBUMIN SERPL ELPH-MCNC: 4.1 G/DL
ALP BLD-CCNC: 94 U/L
ALT SERPL-CCNC: 11 U/L
ANION GAP SERPL CALC-SCNC: 14 MMOL/L
AST SERPL-CCNC: 15 U/L
BASOPHILS # BLD AUTO: 0.09 K/UL
BASOPHILS NFR BLD AUTO: 1.1 %
BILIRUB SERPL-MCNC: 0.5 MG/DL
BUN SERPL-MCNC: 17 MG/DL
CALCIUM SERPL-MCNC: 10 MG/DL
CHLORIDE SERPL-SCNC: 106 MMOL/L
CHOLEST SERPL-MCNC: 141 MG/DL
CHOLEST/HDLC SERPL: 3.4 RATIO
CO2 SERPL-SCNC: 22 MMOL/L
CREAT SERPL-MCNC: 1.84 MG/DL
EOSINOPHIL # BLD AUTO: 0.48 K/UL
EOSINOPHIL NFR BLD AUTO: 5.9 %
ESTIMATED AVERAGE GLUCOSE: 123 MG/DL
GGT SERPL-CCNC: 25 U/L
GLUCOSE SERPL-MCNC: 113 MG/DL
HBA1C MFR BLD HPLC: 5.9 %
HCT VFR BLD CALC: 43.2 %
HDLC SERPL-MCNC: 42 MG/DL
HGB BLD-MCNC: 13.2 G/DL
IMM GRANULOCYTES NFR BLD AUTO: 0.2 %
IRON SATN MFR SERPL: 14 %
IRON SERPL-MCNC: 44 UG/DL
LDLC SERPL CALC-MCNC: 75 MG/DL
LYMPHOCYTES # BLD AUTO: 1.89 K/UL
LYMPHOCYTES NFR BLD AUTO: 23.4 %
MAN DIFF?: NORMAL
MCHC RBC-ENTMCNC: 28.6 PG
MCHC RBC-ENTMCNC: 30.6 GM/DL
MCV RBC AUTO: 93.5 FL
MONOCYTES # BLD AUTO: 0.68 K/UL
MONOCYTES NFR BLD AUTO: 8.4 %
NEUTROPHILS # BLD AUTO: 4.93 K/UL
NEUTROPHILS NFR BLD AUTO: 61 %
PLATELET # BLD AUTO: 267 K/UL
POTASSIUM SERPL-SCNC: 4.5 MMOL/L
PROT SERPL-MCNC: 6.8 G/DL
RBC # BLD: 4.62 M/UL
RBC # FLD: 12.2 %
SODIUM SERPL-SCNC: 142 MMOL/L
T3 SERPL-MCNC: 99 NG/DL
T4 FREE SERPL-MCNC: 2.2 NG/DL
TIBC SERPL-MCNC: 322 UG/DL
TRIGL SERPL-MCNC: 122 MG/DL
TSH SERPL-ACNC: 0.44 UIU/ML
UIBC SERPL-MCNC: 278 UG/DL
WBC # FLD AUTO: 8.09 K/UL

## 2020-03-11 ENCOUNTER — APPOINTMENT (OUTPATIENT)
Dept: CARDIOLOGY | Facility: CLINIC | Age: 75
End: 2020-03-11

## 2020-06-29 ENCOUNTER — APPOINTMENT (OUTPATIENT)
Dept: INTERNAL MEDICINE | Facility: CLINIC | Age: 75
End: 2020-06-29
Payer: MEDICARE

## 2020-06-29 VITALS
TEMPERATURE: 98.2 F | DIASTOLIC BLOOD PRESSURE: 73 MMHG | RESPIRATION RATE: 16 BRPM | OXYGEN SATURATION: 98 % | HEART RATE: 75 BPM | SYSTOLIC BLOOD PRESSURE: 122 MMHG | HEIGHT: 64 IN | WEIGHT: 189 LBS | BODY MASS INDEX: 32.27 KG/M2

## 2020-06-29 PROCEDURE — 99214 OFFICE O/P EST MOD 30 MIN: CPT

## 2020-06-29 NOTE — ASSESSMENT
[FreeTextEntry1] : 74 year old male found to have stable Essential Hypertension, CAD, Chronic Renal Failure stage -2, Hypercholesterolemia with Hypertriglyceridemia, Acquired Hypothyroidism, SDH, with the current regimen, diet and life style modifications, as counseled. Prior results reviewed and discussed with the patient during today's examination. Plan as ordered.\par

## 2020-06-29 NOTE — HISTORY OF PRESENT ILLNESS
[de-identified] : 74 year old  male patient with history of stable Essential Hypertension, CAD, Chronic Renal Failure stage -2, Hypercholesterolemia with Hypertriglyceridemia, Acquired Hypothyroidism, SDH, history as stated, presented for follow up examination. Patient is compliant with all medications. Denies shortness of breath, chest pain or abdominal pains at this time. ROS as stated.\par

## 2020-06-29 NOTE — HEALTH RISK ASSESSMENT
[No] : In the past 12 months have you used drugs other than those required for medical reasons? No [No falls in past year] : Patient reported no falls in the past year [0] : 2) Feeling down, depressed, or hopeless: Not at all (0) [[id number]] : 0 [de-identified] : None [] : No

## 2020-07-11 ENCOUNTER — TRANSCRIPTION ENCOUNTER (OUTPATIENT)
Age: 75
End: 2020-07-11

## 2020-07-11 LAB
ALBUMIN SERPL ELPH-MCNC: 4.2 G/DL
ALP BLD-CCNC: 91 U/L
ALT SERPL-CCNC: 12 U/L
ANION GAP SERPL CALC-SCNC: 17 MMOL/L
AST SERPL-CCNC: 15 U/L
BASOPHILS # BLD AUTO: 0.08 K/UL
BASOPHILS NFR BLD AUTO: 1 %
BILIRUB SERPL-MCNC: 0.5 MG/DL
BUN SERPL-MCNC: 15 MG/DL
CALCIUM SERPL-MCNC: 9.6 MG/DL
CHLORIDE SERPL-SCNC: 105 MMOL/L
CHOLEST SERPL-MCNC: 128 MG/DL
CHOLEST/HDLC SERPL: 3.1 RATIO
CO2 SERPL-SCNC: 21 MMOL/L
CREAT SERPL-MCNC: 1.86 MG/DL
EOSINOPHIL # BLD AUTO: 0.43 K/UL
EOSINOPHIL NFR BLD AUTO: 5.3 %
ESTIMATED AVERAGE GLUCOSE: 126 MG/DL
GGT SERPL-CCNC: 21 U/L
GLUCOSE SERPL-MCNC: 105 MG/DL
HBA1C MFR BLD HPLC: 6 %
HCT VFR BLD CALC: 40.9 %
HDLC SERPL-MCNC: 41 MG/DL
HGB BLD-MCNC: 12.7 G/DL
IMM GRANULOCYTES NFR BLD AUTO: 0.2 %
IRON SATN MFR SERPL: 16 %
IRON SERPL-MCNC: 53 UG/DL
LDLC SERPL CALC-MCNC: 59 MG/DL
LYMPHOCYTES # BLD AUTO: 1.42 K/UL
LYMPHOCYTES NFR BLD AUTO: 17.6 %
MAN DIFF?: NORMAL
MCHC RBC-ENTMCNC: 28.6 PG
MCHC RBC-ENTMCNC: 31.1 GM/DL
MCV RBC AUTO: 92.1 FL
MONOCYTES # BLD AUTO: 0.69 K/UL
MONOCYTES NFR BLD AUTO: 8.5 %
NEUTROPHILS # BLD AUTO: 5.45 K/UL
NEUTROPHILS NFR BLD AUTO: 67.4 %
PLATELET # BLD AUTO: 222 K/UL
POTASSIUM SERPL-SCNC: 5 MMOL/L
PROT SERPL-MCNC: 6.5 G/DL
RBC # BLD: 4.44 M/UL
RBC # FLD: 12.9 %
SARS-COV-2 IGG SERPL IA-ACNC: <0.1 INDEX
SARS-COV-2 IGG SERPL QL IA: NEGATIVE
SODIUM SERPL-SCNC: 143 MMOL/L
T3 SERPL-MCNC: 104 NG/DL
T4 FREE SERPL-MCNC: 1.8 NG/DL
TIBC SERPL-MCNC: 328 UG/DL
TRIGL SERPL-MCNC: 136 MG/DL
TSH SERPL-ACNC: 0.99 UIU/ML
UIBC SERPL-MCNC: 275 UG/DL
WBC # FLD AUTO: 8.09 K/UL

## 2020-09-06 NOTE — CONSULT NOTE ADULT - ASSESSMENT
Dr Lyndsey Scott at bedside for pt evaluation        Donedanyelle Bailon  09/05/20 2009
73 yo M with noted PMH including CAD s/p PCI and negative stress test in 2016 (EF 60%) who presents with atypical chest pain.    1. Chest pain: Patient ruled out for MI with negative cardiac enzymes; his EKG is unremarkable and no events are noted on telemetry. Patient is symptom free and euvolemic on exam.   -Patient has scheduled follow up with his cardiologist (Dr. Cardenas) tomorrow. He can discuss performing ischemic evaluation such as stress test on an outpatient basis. There is no indication for further inpatient cardiac testing at this time.     2. CAD: Patient should resume his outpatient regimen of aspirin, statin, and beta blocker at discharge

## 2020-09-15 NOTE — PATIENT PROFILE ADULT - NSPROPTRIGHTSUPPORTPERSON_GEN_A_NUR
Brief Postoperative Note    Patient: Desiree Velez  YOB: 1945  MRN: [id number]    Date of Procedure: 9/15/2020     Pre-Op Diagnosis: Elevated PSA [R97.20]    Post-Op Diagnosis:Same     Procedure(s):  MRI FUSION GUIDED  PROSTATE BIOPSY    Surgeon(s):  Elena Trinh MD    Surgical Assistant: None    Anesthesia: General     Estimated Blood Loss (mL): Minimal    Complications: None    Specimens:   ID Type Source Tests Collected by Time Destination   1 : RIGHT APEX- 2 BX Preservative Prostate  Elena Trinh MD 9/15/2020 10:20 AM Pathology   2 : RIGHT MID- 4 BX Preservative Prostate  Elena Trinh MD 9/15/2020 10:22 AM Pathology   3 : RIGHT- BASE 3 BX Preservative Prostate  Elena Trinh MD 9/15/2020 10:23 AM Pathology   4 : LEFT APEX- 3BX Preservative Prostate  Elena Trinh MD 9/15/2020 10:24 AM Pathology   5 : LEFT MID- 3 BX Preservative Prostate  Elena Trinh MD 9/15/2020 10:25 AM Pathology   6 : LEFT BASE- 3 BX Preservative Prostate  Elena Trinh MD 9/15/2020 10:26 AM Pathology   7 : CHILANGO- 1 Preservative Prostate  Elena Trinh MD 9/15/2020 10:11 AM Pathology   8 : CHILANGO- 2 Preservative Prostate  Elena Trinh MD 9/15/2020 10:16 AM Pathology        Electronically Signed by Kenneth Henry MD on 9/15/2020 at 10:38 AM same name as above

## 2020-09-23 ENCOUNTER — APPOINTMENT (OUTPATIENT)
Dept: UROLOGY | Facility: CLINIC | Age: 75
End: 2020-09-23

## 2020-10-07 ENCOUNTER — APPOINTMENT (OUTPATIENT)
Dept: UROLOGY | Facility: CLINIC | Age: 75
End: 2020-10-07
Payer: MEDICARE

## 2020-10-07 PROCEDURE — 99213 OFFICE O/P EST LOW 20 MIN: CPT | Mod: 25

## 2020-10-07 PROCEDURE — 76775 US EXAM ABDO BACK WALL LIM: CPT

## 2020-10-07 NOTE — ASSESSMENT
[FreeTextEntry1] : Very pleasant 75-year-old gentleman who presents for follow-up of kidney stones, BPH\par -Renal ultrasound images reviewed with the patient demonstrating no evidence of kidney stones but does demonstrate an atrophic right kidney\par -Follow-up in 6 months with repeat renal ultrasound given history of stones and solitary kidney

## 2020-10-07 NOTE — HISTORY OF PRESENT ILLNESS
[FreeTextEntry1] : Very pleasant 75-year-old gentleman who presents for follow-up of kidney stones.  He feels well.  He denies dysuria.  No hematuria.  No flank pain or suprapubic pain.  No nausea or vomiting.  He underwent a renal ultrasound today which demonstrated an atrophic right kidney and no kidney stones on the left.  No other complaints. [Urinary Retention] : urinary retention [Urinary Urgency] : no urinary urgency [Dysuria] : no dysuria [Hematuria - Gross] : no gross hematuria [Bladder Spasm] : no bladder spasm [Abdominal Pain] : no abdominal pain [Flank Pain] : flank pain [Fever] : no fever [Fatigue] : no fatigue [Nausea] : no nausea [Anorexia] : no anorexia

## 2020-11-04 NOTE — ED PROVIDER NOTE - CRITICAL CARE PROVIDED
----- Message from Azul Davis PA-C sent at 10/30/2020  7:53 AM CDT -----  Blood work shows that vitamin D is mildly low. Take OTC vitamin D supplement. alkaline phosphatase is mildly elevated. Let's recheck this level prior to you appt with Dr. Brady. There is also some evidence of mild anemia. I am placing orders for additional blood work. Please schedule labs prior to appt with Dr. Brady.   consultation with other physicians/consult w/ pt's family directly relating to pts condition/documentation/direct patient care (not related to procedure)/additional history taking/interpretation of diagnostic studies

## 2020-11-16 ENCOUNTER — APPOINTMENT (OUTPATIENT)
Dept: INTERNAL MEDICINE | Facility: CLINIC | Age: 75
End: 2020-11-16
Payer: MEDICARE

## 2020-11-16 ENCOUNTER — NON-APPOINTMENT (OUTPATIENT)
Age: 75
End: 2020-11-16

## 2020-11-16 VITALS
BODY MASS INDEX: 32.1 KG/M2 | RESPIRATION RATE: 16 BRPM | SYSTOLIC BLOOD PRESSURE: 125 MMHG | HEART RATE: 69 BPM | HEIGHT: 64 IN | DIASTOLIC BLOOD PRESSURE: 75 MMHG | TEMPERATURE: 97.6 F | WEIGHT: 188 LBS | OXYGEN SATURATION: 96 %

## 2020-11-16 DIAGNOSIS — Z86.79 PERSONAL HISTORY OF OTHER DISEASES OF THE CIRCULATORY SYSTEM: ICD-10-CM

## 2020-11-16 PROCEDURE — 99072 ADDL SUPL MATRL&STAF TM PHE: CPT

## 2020-11-16 PROCEDURE — 99214 OFFICE O/P EST MOD 30 MIN: CPT | Mod: 25

## 2020-11-16 PROCEDURE — 93000 ELECTROCARDIOGRAM COMPLETE: CPT

## 2020-11-16 NOTE — HEALTH RISK ASSESSMENT
[No] : In the past 12 months have you used drugs other than those required for medical reasons? No [No falls in past year] : Patient reported no falls in the past year [0] : 2) Feeling down, depressed, or hopeless: Not at all (0) [] : No [de-identified] : None [[id number]] : 0

## 2020-11-16 NOTE — ASSESSMENT
[FreeTextEntry1] : 75 year old male found to have stable Essential Hypertension, CAD, Chronic Renal Failure stage -3, Hypercholesterolemia with Hypertriglyceridemia, Acquired Hypothyroidism, SDH, with the current regimen, diet and life style modifications, as counseled. Prior results reviewed and discussed with the patient during today's examination. Plan as ordered.\par EKG showed NSR at the rate of 61 BPM, LAD, non-sp ST-T changes noted.\par

## 2020-11-16 NOTE — HISTORY OF PRESENT ILLNESS
[de-identified] : 75 year old  male patient with history of stable Essential Hypertension, CAD, Chronic Renal Failure stage -3, Hypercholesterolemia with Hypertriglyceridemia, Acquired Hypothyroidism, SDH, history as stated, presented for follow up examination. Patient is compliant with all medications. Denies shortness of breath, chest pain or abdominal pains at this time. ROS as stated.\par

## 2020-11-23 ENCOUNTER — LABORATORY RESULT (OUTPATIENT)
Age: 75
End: 2020-11-23

## 2020-11-23 LAB
25(OH)D3 SERPL-MCNC: 30.9 NG/ML
ALBUMIN SERPL ELPH-MCNC: 4.1 G/DL
ALP BLD-CCNC: 110 U/L
ALT SERPL-CCNC: 9 U/L
ANION GAP SERPL CALC-SCNC: 12 MMOL/L
APPEARANCE: CLEAR
AST SERPL-CCNC: 13 U/L
BASOPHILS # BLD AUTO: 0.12 K/UL
BASOPHILS NFR BLD AUTO: 1.4 %
BILIRUB SERPL-MCNC: 0.6 MG/DL
BILIRUBIN URINE: NEGATIVE
BLOOD URINE: NEGATIVE
BUN SERPL-MCNC: 14 MG/DL
CALCIUM SERPL-MCNC: 9.7 MG/DL
CHLORIDE SERPL-SCNC: 105 MMOL/L
CHOLEST SERPL-MCNC: 153 MG/DL
CO2 SERPL-SCNC: 23 MMOL/L
COLOR: NORMAL
CREAT SERPL-MCNC: 1.75 MG/DL
CREAT SPEC-SCNC: 100 MG/DL
EOSINOPHIL # BLD AUTO: 0.35 K/UL
EOSINOPHIL NFR BLD AUTO: 4 %
ERYTHROCYTE [SEDIMENTATION RATE] IN BLOOD BY WESTERGREN METHOD: 24 MM/HR
ESTIMATED AVERAGE GLUCOSE: 128 MG/DL
FOLATE SERPL-MCNC: 13.9 NG/ML
GGT SERPL-CCNC: 28 U/L
GLUCOSE QUALITATIVE U: NEGATIVE
GLUCOSE SERPL-MCNC: 115 MG/DL
HBA1C MFR BLD HPLC: 6.1 %
HCT VFR BLD CALC: 42 %
HDLC SERPL-MCNC: 46 MG/DL
HGB BLD-MCNC: 13 G/DL
IMM GRANULOCYTES NFR BLD AUTO: 0.3 %
IRON SATN MFR SERPL: 22 %
IRON SERPL-MCNC: 69 UG/DL
KETONES URINE: NEGATIVE
LDLC SERPL CALC-MCNC: 76 MG/DL
LEUKOCYTE ESTERASE URINE: ABNORMAL
LYMPHOCYTES # BLD AUTO: 1.63 K/UL
LYMPHOCYTES NFR BLD AUTO: 18.6 %
MAN DIFF?: NORMAL
MCHC RBC-ENTMCNC: 28.8 PG
MCHC RBC-ENTMCNC: 31 GM/DL
MCV RBC AUTO: 92.9 FL
MICROALBUMIN 24H UR DL<=1MG/L-MCNC: <1.2 MG/DL
MICROALBUMIN/CREAT 24H UR-RTO: NORMAL MG/G
MONOCYTES # BLD AUTO: 0.69 K/UL
MONOCYTES NFR BLD AUTO: 7.9 %
NEUTROPHILS # BLD AUTO: 5.93 K/UL
NEUTROPHILS NFR BLD AUTO: 67.8 %
NITRITE URINE: NEGATIVE
NONHDLC SERPL-MCNC: 107 MG/DL
PH URINE: 6
PLATELET # BLD AUTO: 274 K/UL
POTASSIUM SERPL-SCNC: 4.7 MMOL/L
PROT SERPL-MCNC: 6.5 G/DL
PROTEIN URINE: NEGATIVE
PSA FREE FLD-MCNC: 17 %
PSA FREE SERPL-MCNC: 0.29 NG/ML
PSA SERPL-MCNC: 1.71 NG/ML
RBC # BLD: 4.52 M/UL
RBC # FLD: 12.9 %
SODIUM SERPL-SCNC: 140 MMOL/L
SPECIFIC GRAVITY URINE: 1.02
T3 SERPL-MCNC: 72 NG/DL
T4 FREE SERPL-MCNC: 1.8 NG/DL
TIBC SERPL-MCNC: 318 UG/DL
TRIGL SERPL-MCNC: 156 MG/DL
TSH SERPL-ACNC: 4.03 UIU/ML
UIBC SERPL-MCNC: 249 UG/DL
UROBILINOGEN URINE: NORMAL
VIT B12 SERPL-MCNC: 298 PG/ML
WBC # FLD AUTO: 8.75 K/UL

## 2020-12-21 ENCOUNTER — TRANSCRIPTION ENCOUNTER (OUTPATIENT)
Age: 75
End: 2020-12-21

## 2020-12-28 ENCOUNTER — TRANSCRIPTION ENCOUNTER (OUTPATIENT)
Age: 75
End: 2020-12-28

## 2021-01-11 ENCOUNTER — TRANSCRIPTION ENCOUNTER (OUTPATIENT)
Age: 76
End: 2021-01-11

## 2021-02-16 ENCOUNTER — RX RENEWAL (OUTPATIENT)
Age: 76
End: 2021-02-16

## 2021-03-02 ENCOUNTER — APPOINTMENT (OUTPATIENT)
Dept: INTERNAL MEDICINE | Facility: CLINIC | Age: 76
End: 2021-03-02
Payer: MEDICARE

## 2021-03-03 ENCOUNTER — TRANSCRIPTION ENCOUNTER (OUTPATIENT)
Age: 76
End: 2021-03-03

## 2021-03-22 ENCOUNTER — APPOINTMENT (OUTPATIENT)
Dept: INTERNAL MEDICINE | Facility: CLINIC | Age: 76
End: 2021-03-22
Payer: MEDICARE

## 2021-03-22 VITALS
SYSTOLIC BLOOD PRESSURE: 118 MMHG | HEART RATE: 78 BPM | RESPIRATION RATE: 16 BRPM | DIASTOLIC BLOOD PRESSURE: 75 MMHG | TEMPERATURE: 98.8 F | HEIGHT: 64 IN | BODY MASS INDEX: 30.73 KG/M2 | OXYGEN SATURATION: 96 % | WEIGHT: 180 LBS

## 2021-03-22 DIAGNOSIS — R51.9 HEADACHE, UNSPECIFIED: ICD-10-CM

## 2021-03-22 PROCEDURE — 99214 OFFICE O/P EST MOD 30 MIN: CPT

## 2021-03-22 PROCEDURE — 99072 ADDL SUPL MATRL&STAF TM PHE: CPT

## 2021-03-22 RX ORDER — MOMETASONE FUROATE 1 MG/ML
0.1 SOLUTION TOPICAL
Qty: 30 | Refills: 0 | Status: DISCONTINUED | COMMUNITY
Start: 2020-11-19

## 2021-03-22 NOTE — HISTORY OF PRESENT ILLNESS
[de-identified] : 75 year old  male patient with history of stable Essential Hypertension, CAD, Chronic Renal Failure stage -3, Hypercholesterolemia with Hypertriglyceridemia, Acquired Hypothyroidism, SDH, history as stated, presented for follow up examination. Patient is compliant with all medications. Denies shortness of breath, chest pain or abdominal pains at this time. ROS as stated.\par

## 2021-03-22 NOTE — ASSESSMENT
[FreeTextEntry1] : 75 year old male found to have stable Essential Hypertension, CAD, Chronic Renal Failure stage -3, Hypercholesterolemia with Hypertriglyceridemia, Acquired Hypothyroidism, Chronic SDH,with the current prescription regimen as recommended, diet and life style modifications, as counseled. Prior results reviewed, interpreted and discussed with the patient during today's examination, as appropriate. Follow up, treatment plan and tests, as ordered.\par \par Total time spent : 30 minutes\par Including:\par Preparation prior to visit - Reviewing prior record, results of tests and Consultation Reports as applicable\par Conducting an appropriate H & P during today's encounter\par Appropriate orders for tests, medications and procedures, as applicable\par Counseling patient \par Note completion\par

## 2021-04-26 ENCOUNTER — APPOINTMENT (OUTPATIENT)
Dept: NEUROLOGY | Facility: CLINIC | Age: 76
End: 2021-04-26
Payer: MEDICARE

## 2021-04-26 VITALS
WEIGHT: 180 LBS | BODY MASS INDEX: 30.73 KG/M2 | DIASTOLIC BLOOD PRESSURE: 77 MMHG | SYSTOLIC BLOOD PRESSURE: 125 MMHG | TEMPERATURE: 97.2 F | OXYGEN SATURATION: 97 % | HEIGHT: 64 IN | HEART RATE: 83 BPM

## 2021-04-26 DIAGNOSIS — M51.16 INTERVERTEBRAL DISC DISORDERS WITH RADICULOPATHY, LUMBAR REGION: ICD-10-CM

## 2021-04-26 DIAGNOSIS — G44.321 CHRONIC POST-TRAUMATIC HEADACHE, INTRACTABLE: ICD-10-CM

## 2021-04-26 PROCEDURE — 99204 OFFICE O/P NEW MOD 45 MIN: CPT

## 2021-04-26 PROCEDURE — 99072 ADDL SUPL MATRL&STAF TM PHE: CPT

## 2021-04-26 NOTE — PHYSICAL EXAM
[Person] : oriented to person [Place] : oriented to place [Time] : oriented to time [Concentration Intact] : normal concentrating ability [Visual Intact] : visual attention was ~T not ~L decreased [Naming Objects] : no difficulty naming common objects [Repeating Phrases] : no difficulty repeating a phrase [Writing A Sentence] : no difficulty writing a sentence [Fluency] : fluency intact [Comprehension] : comprehension intact [Reading] : reading intact [Past History] : adequate knowledge of personal past history [Cranial Nerves Optic (II)] : visual acuity intact bilaterally,  visual fields full to confrontation, pupils equal round and reactive to light [Cranial Nerves Oculomotor (III)] : extraocular motion intact [Cranial Nerves Facial (VII)] : face symmetrical [Cranial Nerves Trigeminal (V)] : facial sensation intact symmetrically [Cranial Nerves Vestibulocochlear (VIII)] : hearing was intact bilaterally [Cranial Nerves Glossopharyngeal (IX)] : tongue and palate midline [Cranial Nerves Hypoglossal (XII)] : there was no tongue deviation with protrusion [Cranial Nerves Accessory (XI - Cranial And Spinal)] : head turning and shoulder shrug symmetric [Bitemporal Hemianopsia] : bitemporal hemianopsia present [Pupil Nonreactive To Light - Direct Bilateral] : react to light [Pupil Accommodation Impaired] : react to accommodation [] : normal [Motor Tone] : muscle tone was normal in all four extremities [Motor Strength] : muscle strength was normal in all four extremities [No Muscle Atrophy] : normal bulk in all four extremities [Sensation Tactile Decrease] : light touch was intact [Limited Balance] : the patient's balance was impaired [2+] : Ankle jerk left 2+ [Past-pointing] : there was no past-pointing [Tremor] : no tremor present [Dysdiadochokinesia Bilaterally] : not present [Coordination - Dysmetria Impaired Finger-to-Nose Bilateral] : not present [Coordination - Dysmetria Impaired Heel-to-Shin Bilateral] : not present [Plantar Reflex Right Only] : normal on the right [Plantar Reflex Left Only] : normal on the left [FreeTextEntry8] : Unsteady, unable to perform tandem gait.

## 2021-04-26 NOTE — DISCUSSION/SUMMARY
[FreeTextEntry1] : Reviewed the MRI scan of the head from March 2019 that shows chronic left subdural hematoma.  He admits to snoring at night and morning headaches.  He also feels sleepy in the afternoon and admits to falling asleep watching television.  Is likely that he has obstructive sleep apnea that is predisposing him to migraine headaches.  Home sleep monitor is recommended to investigate.  Repeat MRI scan is also recommended to investigate recurrent subdural hematoma other hemorrhages or stroke.\par Last year his sedimentation rate done by Dr. Ewing was not consistent with polymyalgia rheumatica or giant cell arthritis arteritis.  However his visual field abnormalities today raises the possibility of temporal arteritis causing headaches.  I recommended a ophthalmology visual field checkup.

## 2021-04-26 NOTE — HISTORY OF PRESENT ILLNESS
[FreeTextEntry1] : [place] Did  not lose consciousness but suffered headaches intermittently since then; recently awakens every morning with a headaches right parietal.\par He admits snoring at night.  He does not know whether he chokes.  He also admits falling asleep while watching television and feeling sleepy in the afternoons.  He admits falling asleep in long journeys when he is not driving.

## 2021-05-12 ENCOUNTER — TRANSCRIPTION ENCOUNTER (OUTPATIENT)
Age: 76
End: 2021-05-12

## 2021-05-17 ENCOUNTER — TRANSCRIPTION ENCOUNTER (OUTPATIENT)
Age: 76
End: 2021-05-17

## 2021-05-20 ENCOUNTER — OUTPATIENT (OUTPATIENT)
Dept: OUTPATIENT SERVICES | Facility: HOSPITAL | Age: 76
LOS: 1 days | End: 2021-05-20
Payer: MEDICARE

## 2021-05-20 ENCOUNTER — APPOINTMENT (OUTPATIENT)
Dept: MRI IMAGING | Facility: HOSPITAL | Age: 76
End: 2021-05-20
Payer: MEDICARE

## 2021-05-20 DIAGNOSIS — H26.40 UNSPECIFIED SECONDARY CATARACT: Chronic | ICD-10-CM

## 2021-05-20 DIAGNOSIS — I62.03 NONTRAUMATIC CHRONIC SUBDURAL HEMORRHAGE: ICD-10-CM

## 2021-05-20 DIAGNOSIS — Z96.652 PRESENCE OF LEFT ARTIFICIAL KNEE JOINT: Chronic | ICD-10-CM

## 2021-05-20 DIAGNOSIS — Z95.5 PRESENCE OF CORONARY ANGIOPLASTY IMPLANT AND GRAFT: Chronic | ICD-10-CM

## 2021-05-20 DIAGNOSIS — Z90.49 ACQUIRED ABSENCE OF OTHER SPECIFIED PARTS OF DIGESTIVE TRACT: Chronic | ICD-10-CM

## 2021-05-20 DIAGNOSIS — Z87.442 PERSONAL HISTORY OF URINARY CALCULI: Chronic | ICD-10-CM

## 2021-05-20 DIAGNOSIS — Z98.89 OTHER SPECIFIED POSTPROCEDURAL STATES: Chronic | ICD-10-CM

## 2021-05-20 PROCEDURE — 70551 MRI BRAIN STEM W/O DYE: CPT | Mod: 26

## 2021-05-20 PROCEDURE — 70551 MRI BRAIN STEM W/O DYE: CPT

## 2021-06-01 ENCOUNTER — APPOINTMENT (OUTPATIENT)
Dept: UROLOGY | Facility: CLINIC | Age: 76
End: 2021-06-01
Payer: MEDICARE

## 2021-06-01 DIAGNOSIS — K21.9 GASTRO-ESOPHAGEAL REFLUX DISEASE W/OUT ESOPHAGITIS: ICD-10-CM

## 2021-06-01 PROCEDURE — 99213 OFFICE O/P EST LOW 20 MIN: CPT

## 2021-06-01 NOTE — ASSESSMENT
[FreeTextEntry1] : Very pleasant 75-year-old gentleman who presents for follow-up of kidney stones in a solitary kidney\par -Prior ultrasound images reviewed demonstrating no evidence of kidney stones and a solitary left kidney\par -Repeat renal ultrasound at this time\par -We discussed importance of staying adequately hydrated given history of kidney stones and a solitary kidney

## 2021-06-01 NOTE — DISCHARGE NOTE NURSING/CASE MANAGEMENT/SOCIAL WORK - NSDCPETBCESMAN_GEN_ALL_CORE
If you are a smoker, it is important for your health to stop smoking. Please be aware that second hand smoke is also harmful.
negative

## 2021-06-01 NOTE — HISTORY OF PRESENT ILLNESS
[FreeTextEntry1] : Very pleasant 75-year-old gentleman who presents for follow-up of kidney stones.  He feels well.  He denies dysuria.  No hematuria.  No flank pain or suprapubic pain.  No nausea or vomiting.  He underwent a renal ultrasound 6 months ago which demonstrated an atrophic right kidney and no kidney stones on the left.  No other complaints.

## 2021-06-07 ENCOUNTER — APPOINTMENT (OUTPATIENT)
Dept: NEUROLOGY | Facility: CLINIC | Age: 76
End: 2021-06-07
Payer: MEDICARE

## 2021-06-07 VITALS
BODY MASS INDEX: 30.73 KG/M2 | SYSTOLIC BLOOD PRESSURE: 113 MMHG | HEIGHT: 64 IN | DIASTOLIC BLOOD PRESSURE: 64 MMHG | HEART RATE: 71 BPM | OXYGEN SATURATION: 98 % | TEMPERATURE: 97.3 F | WEIGHT: 180 LBS

## 2021-06-07 PROCEDURE — 99214 OFFICE O/P EST MOD 30 MIN: CPT

## 2021-06-07 NOTE — DISCUSSION/SUMMARY
[FreeTextEntry1] : He will  the sleep monitor today and return it tomorrow.  If it does confirm obstructive sleep apnea treatment might relieve him of his headaches.  Today's [place] cognitive assessment test shows a score of 25/30.  His MRI does show volume loss and I have encouraged him to walk 2 miles a day drink 2 L of water a day to help prevent headaches and maintain his brain volume.\par \par Follow-up is arranged in 1 month

## 2021-06-07 NOTE — HISTORY OF PRESENT ILLNESS
[FreeTextEntry1] : Continues to have occasional headaches.  He continues to snore sometimes choke and is sleepy or drowsy during the afternoons.

## 2021-06-07 NOTE — PHYSICAL EXAM
[Person] : oriented to person [Place] : oriented to place [Time] : oriented to time [Concentration Intact] : normal concentrating ability [Visual Intact] : visual attention was ~T not ~L decreased [Naming Objects] : no difficulty naming common objects [Repeating Phrases] : no difficulty repeating a phrase [Writing A Sentence] : no difficulty writing a sentence [Fluency] : fluency intact [Comprehension] : comprehension intact [Reading] : reading intact [Past History] : adequate knowledge of personal past history [Cranial Nerves Optic (II)] : visual acuity intact bilaterally,  visual fields full to confrontation, pupils equal round and reactive to light [Cranial Nerves Oculomotor (III)] : extraocular motion intact [Cranial Nerves Trigeminal (V)] : facial sensation intact symmetrically [Cranial Nerves Facial (VII)] : face symmetrical [Cranial Nerves Vestibulocochlear (VIII)] : hearing was intact bilaterally [Cranial Nerves Glossopharyngeal (IX)] : tongue and palate midline [Cranial Nerves Accessory (XI - Cranial And Spinal)] : head turning and shoulder shrug symmetric [Cranial Nerves Hypoglossal (XII)] : there was no tongue deviation with protrusion [Bitemporal Hemianopsia] : bitemporal hemianopsia present [Pupil Nonreactive To Light - Direct Bilateral] : react to light [Pupil Accommodation Impaired] : react to accommodation [] : normal [Motor Tone] : muscle tone was normal in all four extremities [Motor Strength] : muscle strength was normal in all four extremities [No Muscle Atrophy] : normal bulk in all four extremities [Sensation Tactile Decrease] : light touch was intact [Limited Balance] : the patient's balance was impaired [2+] : Ankle jerk left 2+ [Sclera] : the sclera and conjunctiva were normal [PERRL With Normal Accommodation] : pupils were equal in size, round, reactive to light, with normal accommodation [Extraocular Movements] : extraocular movements were intact [Past-pointing] : there was no past-pointing [Tremor] : no tremor present [Dysdiadochokinesia Bilaterally] : not present [Coordination - Dysmetria Impaired Finger-to-Nose Bilateral] : not present [Coordination - Dysmetria Impaired Heel-to-Shin Bilateral] : not present [Plantar Reflex Right Only] : normal on the right [Plantar Reflex Left Only] : normal on the left [FreeTextEntry8] : Unsteady, unable to perform tandem gait.

## 2021-06-07 NOTE — DATA REVIEWED
[de-identified] :  EXAM: MR BRAIN   PROCEDURE DATE: 05/20/2021    INTERPRETATION: CLINICAL STATEMENT: Headaches  TECHNIQUE: MRI of the brain was performed without gadolinium.  COMPARISON: MRI brain 3/16/2019  FINDINGS: There is moderate diffuse parenchymal volume loss. There are T2 prolongation signal abnormalities in the periventricular subcortical white matter likely related to mild chronic microvascular ischemic changes.  There is no acute parenchymal hemorrhage, parenchymal mass, mass effect or midline shift. There is no extra-axial fluid collection. There is no hydrocephalus. There is no acute infarct. Partial empty sella.  Mucosal thickening paranasal sinuses. Retention cyst/polyp right maxillary sinus  IMPRESSION: No acute intracranial hemorrhage or acute infarct.      JENNIFER OKEEFE MD; Attending Radiologist This document has been electronically signed. May 20 2021 1:41PM

## 2021-07-06 NOTE — ED PROVIDER NOTE - NSCAREINITIATED _GEN_ER
Isacc Aguayo is calling to request a refill on the following medication(s):    Last Visit Date (If Applicable):  7/99/1872    Next Visit Date:    7/6/2021    Medication Request:  Requested Prescriptions     Pending Prescriptions Disp Refills    DULoxetine (CYMBALTA) 60 MG extended release capsule [Pharmacy Med Name: DULOXETINE HCL DR 60 MG CAP] 30 capsule 2     Sig: take 1 capsule by mouth daily
Arianna Carreno(Attending)

## 2021-07-09 ENCOUNTER — APPOINTMENT (OUTPATIENT)
Dept: UROLOGY | Facility: CLINIC | Age: 76
End: 2021-07-09

## 2021-07-15 ENCOUNTER — APPOINTMENT (OUTPATIENT)
Dept: INTERNAL MEDICINE | Facility: CLINIC | Age: 76
End: 2021-07-15
Payer: MEDICARE

## 2021-07-15 VITALS
WEIGHT: 180 LBS | HEIGHT: 64 IN | SYSTOLIC BLOOD PRESSURE: 97 MMHG | BODY MASS INDEX: 30.73 KG/M2 | RESPIRATION RATE: 16 BRPM | DIASTOLIC BLOOD PRESSURE: 59 MMHG | TEMPERATURE: 97.3 F | HEART RATE: 69 BPM | OXYGEN SATURATION: 96 %

## 2021-07-15 PROCEDURE — 99214 OFFICE O/P EST MOD 30 MIN: CPT

## 2021-07-15 NOTE — ASSESSMENT
[FreeTextEntry1] : 75 year old male found to have stable Essential Hypertension, CAD, Chronic Renal Failure stage -3, Hypercholesterolemia with Hypertriglyceridemia, Acquired Hypothyroidism, Elevated Hemoglobin A1c, Iron Deficiency Anemia, with the current prescription regimen as recommended, diet and life style modifications, as counseled. Prior results reviewed, interpreted and discussed with the patient during today's examination, as appropriate. Follow up, treatment plan and tests, as ordered.\par \par Total time spent : 30 minutes\par Including:\par Preparation prior to visit - Reviewing prior record, results of tests and Consultation Reports as applicable\par Conducting an appropriate H & P during today's encounter\par Appropriate orders for tests, medications and procedures, as applicable\par Counseling patient \par Note completion\par \par

## 2021-07-15 NOTE — HISTORY OF PRESENT ILLNESS
[de-identified] : 75 year old  male patient with history of stable Essential Hypertension, CAD, Chronic Renal Failure stage -3, Hypercholesterolemia with Hypertriglyceridemia, Acquired Hypothyroidism, history as stated, presented for follow up examination. Patient is compliant with all medications. Denies shortness of breath, chest pain or abdominal pains at this time. ROS as stated.\par

## 2021-07-17 LAB
ALBUMIN SERPL ELPH-MCNC: 4.3 G/DL
ALP BLD-CCNC: 95 U/L
ALT SERPL-CCNC: 14 U/L
ANION GAP SERPL CALC-SCNC: 13 MMOL/L
AST SERPL-CCNC: 16 U/L
BASOPHILS # BLD AUTO: 0.12 K/UL
BASOPHILS NFR BLD AUTO: 1.3 %
BILIRUB SERPL-MCNC: 0.5 MG/DL
BUN SERPL-MCNC: 14 MG/DL
CALCIUM SERPL-MCNC: 9.9 MG/DL
CHLORIDE SERPL-SCNC: 105 MMOL/L
CHOLEST SERPL-MCNC: 141 MG/DL
CO2 SERPL-SCNC: 22 MMOL/L
CREAT SERPL-MCNC: 1.83 MG/DL
EOSINOPHIL # BLD AUTO: 0.49 K/UL
EOSINOPHIL NFR BLD AUTO: 5.5 %
ESTIMATED AVERAGE GLUCOSE: 126 MG/DL
GGT SERPL-CCNC: 24 U/L
GLUCOSE SERPL-MCNC: 109 MG/DL
HBA1C MFR BLD HPLC: 6 %
HCT VFR BLD CALC: 42.8 %
HDLC SERPL-MCNC: 43 MG/DL
HGB BLD-MCNC: 12.9 G/DL
IMM GRANULOCYTES NFR BLD AUTO: 0.2 %
IRON SATN MFR SERPL: 19 %
IRON SERPL-MCNC: 66 UG/DL
LDLC SERPL CALC-MCNC: 71 MG/DL
LYMPHOCYTES # BLD AUTO: 1.7 K/UL
LYMPHOCYTES NFR BLD AUTO: 19.1 %
MAN DIFF?: NORMAL
MCHC RBC-ENTMCNC: 28.7 PG
MCHC RBC-ENTMCNC: 30.1 GM/DL
MCV RBC AUTO: 95.3 FL
MONOCYTES # BLD AUTO: 0.72 K/UL
MONOCYTES NFR BLD AUTO: 8.1 %
NEUTROPHILS # BLD AUTO: 5.87 K/UL
NEUTROPHILS NFR BLD AUTO: 65.8 %
NONHDLC SERPL-MCNC: 99 MG/DL
PLATELET # BLD AUTO: 251 K/UL
POTASSIUM SERPL-SCNC: 4.8 MMOL/L
PROT SERPL-MCNC: 6.6 G/DL
RBC # BLD: 4.49 M/UL
RBC # FLD: 13.7 %
SODIUM SERPL-SCNC: 140 MMOL/L
T3 SERPL-MCNC: 93 NG/DL
T4 FREE SERPL-MCNC: 1.7 NG/DL
TIBC SERPL-MCNC: 354 UG/DL
TRIGL SERPL-MCNC: 139 MG/DL
TSH SERPL-ACNC: 1.63 UIU/ML
UIBC SERPL-MCNC: 288 UG/DL
WBC # FLD AUTO: 8.92 K/UL

## 2021-08-06 NOTE — PATIENT PROFILE ADULT - NSPROHMGIMGMTSTRAT_GEN_A_NUR
Pharmacy Note - Admission Medication History    Pertinent Provider Information:      ______________________________________________________________________    Prior To Admission (PTA) med list completed and updated in EMR.       PTA Med List   Medication Sig Last Dose     amLODIPine (NORVASC) 10 MG tablet  8/5/2021 at Unknown time     aspirin (ASA) 81 MG chewable tablet Take 81 mg by mouth Past Month at Unknown time     cephALEXin (KEFLEX) 500 MG capsule Take 1 capsule (500 mg) by mouth 3 times daily for 7 days      glimepiride (AMARYL) 4 MG tablet TK 1 T PO QD WITH FIRST MEAL OF THE DAY 8/5/2021 at Unknown time     hydrALAZINE (APRESOLINE) 100 MG tablet Take 100 mg by mouth 4 times daily 8/5/2021 at Unknown time     HYDROcodone-acetaminophen (NORCO) 5-325 MG tablet Take 1 tablet by mouth every 6 hours as needed for pain      LANTUS SOLOSTAR 100 UNIT/ML soln 26 Units  8/4/2021 at Unknown time     memantine (NAMENDA) 10 MG tablet Take 1 tablet (10 mg) by mouth 2 times daily 8/5/2021 at Unknown time     metoprolol succinate ER (TOPROL-XL) 50 MG 24 hr tablet Take 50 mg by mouth 8/5/2021 at Unknown time     multivitamin, therapeutic (THERA-VIT) TABS tablet Take 1 tablet by mouth daily 8/5/2021 at Unknown time     omeprazole (PRILOSEC) 20 MG DR capsule TAKE 1 DAILY, 1/2 HOUR  BEFORE BREAKFAST FOR  HEARTBURN. 8/5/2021 at Unknown time     polyethylene glycol (MIRALAX) 17 GM/Dose powder Take 17 g by mouth daily      rivastigmine (EXELON) 13.3 MG/24HR 24 hr patch Apply 1 patch topically daily 8/5/2021 at Unknown time     simvastatin (ZOCOR) 20 MG tablet Take 20 mg by mouth 8/4/2021 at Unknown time     TRULICITY 1.5 MG/0.5ML pen INJECT 1.5 MG UNDER THE SKIN ONCE A WEEK 8/4/2021 at Unknown time       Information source(s): Patient and CareEverywhere/SureScripts  Method of interview communication: in-person    Summary of Changes to PTA Med List  New:   Discontinued:   Changed:     Patient was asked about OTC/herbal products  specifically.  PTA med list reflects this.    In the past week, patient estimated taking medication this percent of the time:  greater than 90%.    Allergies were reviewed, assessed, and updated with the patient.      Patient did not bring any medications to the hospital and can't retrieve from home. No multi-dose medications are available for use during hospital stay.     The information provided in this note is only as accurate as the sources available at the time of the update(s).    Thank you for the opportunity to participate in the care of this patient.    Lucero Martinez RPH  8/5/2021 9:44 PM     none

## 2021-08-31 ENCOUNTER — APPOINTMENT (OUTPATIENT)
Dept: GASTROENTEROLOGY | Facility: CLINIC | Age: 76
End: 2021-08-31
Payer: MEDICARE

## 2021-08-31 VITALS
HEART RATE: 67 BPM | WEIGHT: 179 LBS | OXYGEN SATURATION: 94 % | BODY MASS INDEX: 30.56 KG/M2 | HEIGHT: 64 IN | TEMPERATURE: 97.8 F | DIASTOLIC BLOOD PRESSURE: 72 MMHG | SYSTOLIC BLOOD PRESSURE: 113 MMHG

## 2021-08-31 DIAGNOSIS — D64.89 OTHER SPECIFIED ANEMIAS: ICD-10-CM

## 2021-08-31 PROCEDURE — 99203 OFFICE O/P NEW LOW 30 MIN: CPT

## 2021-08-31 NOTE — ASSESSMENT
[FreeTextEntry1] : This patient has iron deficiency anemia.  My plan will be\par \par Colonoscopy\par EGD\par Covid testing

## 2021-08-31 NOTE — HISTORY OF PRESENT ILLNESS
[de-identified] : This patient is 76 years old with iron deficiency anemia.  Last colonoscopy 5+ years ago.  He never had an EGD.  He does have some degree of chronic renal insufficiency with a creatinine of 1.83.  He has no family history of colon cancer.  He has no rectal bleeding nor does he see blood in the stool.  He has occasional rectal pain relieved by bowel movement.  No weight loss.  No other issues.  The size of the stool remains the same.  He is only vegetarian.  His hemoglobin was 12.9

## 2021-09-03 ENCOUNTER — OUTPATIENT (OUTPATIENT)
Dept: OUTPATIENT SERVICES | Facility: HOSPITAL | Age: 76
LOS: 1 days | End: 2021-09-03
Payer: MEDICARE

## 2021-09-03 ENCOUNTER — RESULT REVIEW (OUTPATIENT)
Age: 76
End: 2021-09-03

## 2021-09-03 ENCOUNTER — APPOINTMENT (OUTPATIENT)
Dept: GASTROENTEROLOGY | Facility: HOSPITAL | Age: 76
End: 2021-09-03

## 2021-09-03 DIAGNOSIS — Z98.89 OTHER SPECIFIED POSTPROCEDURAL STATES: Chronic | ICD-10-CM

## 2021-09-03 DIAGNOSIS — Z87.442 PERSONAL HISTORY OF URINARY CALCULI: Chronic | ICD-10-CM

## 2021-09-03 DIAGNOSIS — Z90.49 ACQUIRED ABSENCE OF OTHER SPECIFIED PARTS OF DIGESTIVE TRACT: Chronic | ICD-10-CM

## 2021-09-03 DIAGNOSIS — D50.9 IRON DEFICIENCY ANEMIA, UNSPECIFIED: ICD-10-CM

## 2021-09-03 DIAGNOSIS — Z95.5 PRESENCE OF CORONARY ANGIOPLASTY IMPLANT AND GRAFT: Chronic | ICD-10-CM

## 2021-09-03 DIAGNOSIS — Z96.652 PRESENCE OF LEFT ARTIFICIAL KNEE JOINT: Chronic | ICD-10-CM

## 2021-09-03 DIAGNOSIS — H26.40 UNSPECIFIED SECONDARY CATARACT: Chronic | ICD-10-CM

## 2021-09-03 PROCEDURE — 88312 SPECIAL STAINS GROUP 1: CPT | Mod: 26

## 2021-09-03 PROCEDURE — 88305 TISSUE EXAM BY PATHOLOGIST: CPT

## 2021-09-03 PROCEDURE — 43239 EGD BIOPSY SINGLE/MULTIPLE: CPT

## 2021-09-03 PROCEDURE — 88312 SPECIAL STAINS GROUP 1: CPT

## 2021-09-03 PROCEDURE — 88305 TISSUE EXAM BY PATHOLOGIST: CPT | Mod: 26

## 2021-09-03 NOTE — PROGRESS NOTE ADULT - SUBJECTIVE AND OBJECTIVE BOX
Esophagogastroduodenoscopy Report  Indication: Anemia  Referring MD:   Instrument:  #[id number]  Anesthesia: MAC  Consent:  Informed consent was obtained from the patient after providing any opportunity for questions  Procedure: The gastroscope was gently passed through the incisoral orifice into the oral cavity and under direct visualization the esophagus was intubated. The endoscope was passed down the esophagus, through the stomach and into proximal jejunum. Color, texture, mucosa and anatomy of the esophagus, stomach, and duodenum were carefully examined with the scope. The patient tolerated the procedure well. After completion of the examination, the patient was transferred to the recovery room.   Preparation: NPO   Findings:   Oropharynx	Normal  Esophagus	Normal  EG-junction	Normal  Cardia	Normal.  Body	Normal   Antrum	gastritis, bx taken  Pylorus	Normal.  Duodenal Bulb	Normal   2nd portion	Normal, Biopsy taken  3rd portion	Normal.  Date and time:    EBL:0  Impression: Antral gastritis    Plan: F/u Biopsies                              Procedure Start Time: 11:14  Procedure End Time:  11:16                                   Attending:   Lars Vargas MD

## 2021-09-07 ENCOUNTER — APPOINTMENT (OUTPATIENT)
Dept: UROLOGY | Facility: CLINIC | Age: 76
End: 2021-09-07

## 2021-09-07 ENCOUNTER — APPOINTMENT (OUTPATIENT)
Dept: UROLOGY | Facility: CLINIC | Age: 76
End: 2021-09-07
Payer: MEDICARE

## 2021-09-07 PROCEDURE — 76775 US EXAM ABDO BACK WALL LIM: CPT

## 2021-09-07 PROCEDURE — 99213 OFFICE O/P EST LOW 20 MIN: CPT | Mod: 25

## 2021-09-07 NOTE — HISTORY OF PRESENT ILLNESS
[FreeTextEntry1] : Very pleasant 75-year-old gentleman who presents for follow-up of kidney stones and renal cysts.  He feels well.  He denies dysuria.  No hematuria.  No flank pain or suprapubic pain.  No nausea or vomiting.  He underwent a renal ultrasound today which demonstrated an atrophic right kidney.  It demonstrated bilateral small simple renal cysts as well as no evidence of kidney stones, bilaterally.  No other complaints.

## 2021-09-07 NOTE — ASSESSMENT
[FreeTextEntry1] : Very pleasant 75-year-old gentleman who presents for follow-up of kidney stones, renal cysts\par -Renal ultrasound images reviewed with the patient demonstrating small bilateral simple renal cysts without concerning features.  No evidence of kidney stones or hydronephrosis\par -We discussed the benign nature of simple renal cysts\par -Follow-up in 1 year with renal ultrasound

## 2021-09-08 LAB
SARS-COV-2 N GENE NPH QL NAA+PROBE: NOT DETECTED
SURGICAL PATHOLOGY STUDY: SIGNIFICANT CHANGE UP

## 2021-09-12 ENCOUNTER — APPOINTMENT (OUTPATIENT)
Dept: DISASTER EMERGENCY | Facility: CLINIC | Age: 76
End: 2021-09-12

## 2021-09-13 LAB — SARS-COV-2 N GENE NPH QL NAA+PROBE: NOT DETECTED

## 2021-09-14 ENCOUNTER — APPOINTMENT (OUTPATIENT)
Dept: NEUROLOGY | Facility: CLINIC | Age: 76
End: 2021-09-14

## 2021-09-15 ENCOUNTER — APPOINTMENT (OUTPATIENT)
Dept: GASTROENTEROLOGY | Facility: HOSPITAL | Age: 76
End: 2021-09-15

## 2021-09-15 ENCOUNTER — OUTPATIENT (OUTPATIENT)
Dept: OUTPATIENT SERVICES | Facility: HOSPITAL | Age: 76
LOS: 1 days | End: 2021-09-15
Payer: MEDICARE

## 2021-09-15 ENCOUNTER — RESULT REVIEW (OUTPATIENT)
Age: 76
End: 2021-09-15

## 2021-09-15 DIAGNOSIS — Z90.49 ACQUIRED ABSENCE OF OTHER SPECIFIED PARTS OF DIGESTIVE TRACT: Chronic | ICD-10-CM

## 2021-09-15 DIAGNOSIS — Z96.652 PRESENCE OF LEFT ARTIFICIAL KNEE JOINT: Chronic | ICD-10-CM

## 2021-09-15 DIAGNOSIS — Z87.442 PERSONAL HISTORY OF URINARY CALCULI: Chronic | ICD-10-CM

## 2021-09-15 DIAGNOSIS — Z95.5 PRESENCE OF CORONARY ANGIOPLASTY IMPLANT AND GRAFT: Chronic | ICD-10-CM

## 2021-09-15 DIAGNOSIS — Z98.89 OTHER SPECIFIED POSTPROCEDURAL STATES: Chronic | ICD-10-CM

## 2021-09-15 DIAGNOSIS — H26.40 UNSPECIFIED SECONDARY CATARACT: Chronic | ICD-10-CM

## 2021-09-15 DIAGNOSIS — D50.9 IRON DEFICIENCY ANEMIA, UNSPECIFIED: ICD-10-CM

## 2021-09-15 PROCEDURE — 88305 TISSUE EXAM BY PATHOLOGIST: CPT | Mod: 26

## 2021-09-15 PROCEDURE — 45380 COLONOSCOPY AND BIOPSY: CPT

## 2021-09-15 PROCEDURE — 88305 TISSUE EXAM BY PATHOLOGIST: CPT

## 2021-09-15 NOTE — PROGRESS NOTE ADULT - SUBJECTIVE AND OBJECTIVE BOX
Colonoscopy Report  Indication: Anemia   Referring:    Instrument:  [id number]  Anesthesia: MAC  Consent:  Informed consent was obtained from the patient after providing any opportunity for questions  Procedure: After placing the patient in the left lateral decubitus position, the colonoscope was gently inserted into the rectum and advanced to the cecum. Color, texture, mucosa, and anatomy of the colon were carefully examined with the scope. The patient tolerated the procedure well. After completion of the exam, the patient was transferred to the recovery room.     Preparation:  Findings:   Anal Canal	Normal  Rectum	Normal  Sigmoid Colon 	Mild Diverticulosis  Descending Colon	Normal  Splenic Flexure	Normal  Transverse Colon	Normal  Hepatic Flexure	Normal  Ascending Colon   Mild diverticulosis  Cecum	Normal  Ileo-cecal Valve	floppy and enlarged, biopsied  Ileum 	Normal  Date and time:   EBL:0    Impression:  Floppy and enlarged IC valve ,biopsied                     Diverticulosis as described      Plan  f/u biopsies  repeat Colon 7 yrs                    Procedure Start Time:  12:19  Cecum Reached Time: 12:24  Procedure End Time:   12:31  Total Withdrawal Time:                                 Attending: Lars Vargas MD

## 2021-09-17 LAB — SURGICAL PATHOLOGY STUDY: SIGNIFICANT CHANGE UP

## 2021-09-23 ENCOUNTER — APPOINTMENT (OUTPATIENT)
Dept: GASTROENTEROLOGY | Facility: CLINIC | Age: 76
End: 2021-09-23
Payer: MEDICARE

## 2021-09-23 VITALS
SYSTOLIC BLOOD PRESSURE: 117 MMHG | HEART RATE: 67 BPM | WEIGHT: 178 LBS | TEMPERATURE: 97.9 F | OXYGEN SATURATION: 97 % | BODY MASS INDEX: 30.39 KG/M2 | HEIGHT: 64 IN | DIASTOLIC BLOOD PRESSURE: 69 MMHG

## 2021-09-23 PROCEDURE — 99213 OFFICE O/P EST LOW 20 MIN: CPT

## 2021-09-24 NOTE — ASSESSMENT
[FreeTextEntry1] : This patient has iron deficiency.  He has not had a capsule endoscopy as of yet\par Plan will be small bowel capsule endoscopy\par \par \par \par \par

## 2021-09-24 NOTE — HISTORY OF PRESENT ILLNESS
[de-identified] : This patient is back in follow-up of colonoscopy and EGD.  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  He had diverticulosis.  His H. pylori was negative.  He has iron deficiency.  He never had a small bowel capsule endoscopy

## 2021-09-24 NOTE — PHYSICAL EXAM
[General Appearance - Alert] : alert [General Appearance - In No Acute Distress] : in no acute distress [Sclera] : the sclera and conjunctiva were normal [PERRL With Normal Accommodation] : pupils were equal in size, round, and reactive to light [Extraocular Movements] : extraocular movements were intact [Outer Ear] : the ears and nose were normal in appearance [Oropharynx] : the oropharynx was normal [Neck Appearance] : the appearance of the neck was normal [Neck Cervical Mass (___cm)] : no neck mass was observed [Jugular Venous Distention Increased] : there was no jugular-venous distention [Thyroid Diffuse Enlargement] : the thyroid was not enlarged [Thyroid Nodule] : there were no palpable thyroid nodules [] : no respiratory distress [Auscultation Breath Sounds / Voice Sounds] : lungs were clear to auscultation bilaterally [Heart Rate And Rhythm] : heart rate was normal and rhythm regular [Heart Sounds Gallop] : no gallops [Heart Sounds] : normal S1 and S2 [Murmurs] : no murmurs [Heart Sounds Pericardial Friction Rub] : no pericardial rub [Full Pulse] : the pedal pulses are present [Edema] : there was no peripheral edema

## 2021-12-26 ENCOUNTER — RX RENEWAL (OUTPATIENT)
Age: 76
End: 2021-12-26

## 2021-12-28 ENCOUNTER — APPOINTMENT (OUTPATIENT)
Dept: OPHTHALMOLOGY | Facility: CLINIC | Age: 76
End: 2021-12-28

## 2022-01-25 ENCOUNTER — APPOINTMENT (OUTPATIENT)
Dept: INTERNAL MEDICINE | Facility: CLINIC | Age: 77
End: 2022-01-25

## 2022-02-01 NOTE — PATIENT PROFILE ADULT - NSPROEDAREADYLEARN_GEN_A_NUR
[FreeTextEntry1] : 30 yo history of recurrent painful oral ulcers since childhood.  \par \par here for f/u but also a new acute issue\par \par #left foot/ankle swelling. 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 etiology\par - give low ANC - will repeat it today \par - rec heme f/u\par \par More than 50% of the encounter was spent counseling the patient on differential, workup, disease course and treatment/management.  Education was provided to the patient during this encounter.  All questions and concerns were addressed and answered.   The patient verbalized understanding and agreed to the plan. \par \par Patient has been instructed to call for an appointment if new symptoms develop.\par Patient has been instructed to make a followup appointment in 3 months-align with next infusion\par  none

## 2022-03-28 ENCOUNTER — APPOINTMENT (OUTPATIENT)
Dept: INTERNAL MEDICINE | Facility: CLINIC | Age: 77
End: 2022-03-28
Payer: MEDICARE

## 2022-03-28 VITALS
HEIGHT: 64 IN | OXYGEN SATURATION: 96 % | HEART RATE: 81 BPM | BODY MASS INDEX: 31.24 KG/M2 | SYSTOLIC BLOOD PRESSURE: 122 MMHG | DIASTOLIC BLOOD PRESSURE: 74 MMHG | TEMPERATURE: 96.5 F | WEIGHT: 183 LBS | RESPIRATION RATE: 16 BRPM

## 2022-03-28 DIAGNOSIS — D50.9 IRON DEFICIENCY ANEMIA, UNSPECIFIED: ICD-10-CM

## 2022-03-28 PROCEDURE — 99214 OFFICE O/P EST MOD 30 MIN: CPT

## 2022-03-28 RX ORDER — POLYETHYLENE GLYCOL-3350 AND ELECTROLYTES WITH FLAVOR PACK 240; 5.84; 2.98; 6.72; 22.72 G/278.26G; G/278.26G; G/278.26G; G/278.26G; G/278.26G
240 POWDER, FOR SOLUTION ORAL
Qty: 1 | Refills: 0 | Status: DISCONTINUED | COMMUNITY
Start: 2021-08-31 | End: 2022-03-28

## 2022-03-28 RX ORDER — MOMETASONE FUROATE 1 MG/G
0.1 OINTMENT TOPICAL
Qty: 45 | Refills: 0 | Status: DISCONTINUED | COMMUNITY
Start: 2021-04-28 | End: 2022-03-28

## 2022-03-28 NOTE — ASSESSMENT
[FreeTextEntry1] : 76 year old male found to have stable Essential Hypertension, CAD, Chronic Renal Failure stage -3, Hypercholesterolemia with Hypertriglyceridemia, Acquired Hypothyroidism, Elevated Hemoglobin A1c, Iron Deficiency Anemia, with the current prescription regimen as recommended, diet and life style modifications, as counseled. Prior results reviewed, interpreted and discussed with the patient during today's examination, as appropriate. Follow up, treatment plan and tests, as ordered.\par \par Total time spent : 30 minutes\par Including:\par Preparation prior to visit - Reviewing prior record, results of tests and Consultation Reports as applicable\par Conducting an appropriate H & P during today's encounter\par Appropriate orders for tests, medications and procedures, as applicable\par Counseling patient \par Note completion\par \par

## 2022-03-28 NOTE — HISTORY OF PRESENT ILLNESS
[de-identified] : 76 year old  male patient with history of stable Essential Hypertension, CAD, Chronic Renal Failure stage -3, Hypercholesterolemia with Hypertriglyceridemia, Acquired Hypothyroidism, history as stated, presented for follow up examination. Patient is compliant with all medications. Denies shortness of breath, chest pain or abdominal pains at this time. ROS as stated.\par

## 2022-03-29 ENCOUNTER — LABORATORY RESULT (OUTPATIENT)
Age: 77
End: 2022-03-29

## 2022-03-30 RX ORDER — MULTIVIT-MIN/FOLIC/VIT K/LYCOP 400-300MCG
50 MCG TABLET ORAL
Qty: 90 | Refills: 3 | Status: ACTIVE | COMMUNITY
Start: 2022-03-30

## 2022-04-11 ENCOUNTER — EMERGENCY (EMERGENCY)
Facility: HOSPITAL | Age: 77
LOS: 1 days | Discharge: HOME | End: 2022-04-11
Attending: EMERGENCY MEDICINE
Payer: MEDICARE

## 2022-04-11 VITALS
DIASTOLIC BLOOD PRESSURE: 69 MMHG | SYSTOLIC BLOOD PRESSURE: 111 MMHG | RESPIRATION RATE: 18 BRPM | OXYGEN SATURATION: 96 % | WEIGHT: 179.9 LBS | TEMPERATURE: 98 F | HEART RATE: 65 BPM | HEIGHT: 64 IN

## 2022-04-11 DIAGNOSIS — Z98.89 OTHER SPECIFIED POSTPROCEDURAL STATES: Chronic | ICD-10-CM

## 2022-04-11 DIAGNOSIS — Z95.5 PRESENCE OF CORONARY ANGIOPLASTY IMPLANT AND GRAFT: Chronic | ICD-10-CM

## 2022-04-11 DIAGNOSIS — H26.40 UNSPECIFIED SECONDARY CATARACT: Chronic | ICD-10-CM

## 2022-04-11 DIAGNOSIS — Z90.49 ACQUIRED ABSENCE OF OTHER SPECIFIED PARTS OF DIGESTIVE TRACT: Chronic | ICD-10-CM

## 2022-04-11 DIAGNOSIS — Z96.652 PRESENCE OF LEFT ARTIFICIAL KNEE JOINT: Chronic | ICD-10-CM

## 2022-04-11 DIAGNOSIS — Z87.442 PERSONAL HISTORY OF URINARY CALCULI: Chronic | ICD-10-CM

## 2022-04-11 LAB
ALBUMIN SERPL ELPH-MCNC: 3.1 G/DL — LOW (ref 3.5–5)
ALP SERPL-CCNC: 86 U/L — SIGNIFICANT CHANGE UP (ref 40–120)
ALT FLD-CCNC: 20 U/L DA — SIGNIFICANT CHANGE UP (ref 10–60)
ANION GAP SERPL CALC-SCNC: 5 MMOL/L — SIGNIFICANT CHANGE UP (ref 5–17)
APPEARANCE UR: ABNORMAL
AST SERPL-CCNC: 15 U/L — SIGNIFICANT CHANGE UP (ref 10–40)
BASOPHILS # BLD AUTO: 0.07 K/UL — SIGNIFICANT CHANGE UP (ref 0–0.2)
BASOPHILS NFR BLD AUTO: 0.8 % — SIGNIFICANT CHANGE UP (ref 0–2)
BILIRUB DIRECT SERPL-MCNC: 0.1 MG/DL — SIGNIFICANT CHANGE UP (ref 0–0.3)
BILIRUB INDIRECT FLD-MCNC: 0.5 MG/DL — SIGNIFICANT CHANGE UP (ref 0.2–1)
BILIRUB SERPL-MCNC: 0.6 MG/DL — SIGNIFICANT CHANGE UP (ref 0.2–1.2)
BILIRUB UR-MCNC: NEGATIVE — SIGNIFICANT CHANGE UP
BUN SERPL-MCNC: 14 MG/DL — SIGNIFICANT CHANGE UP (ref 7–18)
CALCIUM SERPL-MCNC: 10.1 MG/DL — SIGNIFICANT CHANGE UP (ref 8.4–10.5)
CHLORIDE SERPL-SCNC: 109 MMOL/L — HIGH (ref 96–108)
CO2 SERPL-SCNC: 26 MMOL/L — SIGNIFICANT CHANGE UP (ref 22–31)
COLOR SPEC: YELLOW — SIGNIFICANT CHANGE UP
CREAT SERPL-MCNC: 1.67 MG/DL — HIGH (ref 0.5–1.3)
DIFF PNL FLD: ABNORMAL
EGFR: 42 ML/MIN/1.73M2 — LOW
EOSINOPHIL # BLD AUTO: 0.19 K/UL — SIGNIFICANT CHANGE UP (ref 0–0.5)
EOSINOPHIL NFR BLD AUTO: 2.3 % — SIGNIFICANT CHANGE UP (ref 0–6)
GLUCOSE SERPL-MCNC: 100 MG/DL — HIGH (ref 70–99)
GLUCOSE UR QL: NEGATIVE — SIGNIFICANT CHANGE UP
HCT VFR BLD CALC: 38.7 % — LOW (ref 39–50)
HGB BLD-MCNC: 12.3 G/DL — LOW (ref 13–17)
IMM GRANULOCYTES NFR BLD AUTO: 0.2 % — SIGNIFICANT CHANGE UP (ref 0–1.5)
KETONES UR-MCNC: NEGATIVE — SIGNIFICANT CHANGE UP
LACTATE SERPL-SCNC: 1.5 MMOL/L — SIGNIFICANT CHANGE UP (ref 0.7–2)
LEUKOCYTE ESTERASE UR-ACNC: ABNORMAL
LIDOCAIN IGE QN: 132 U/L — SIGNIFICANT CHANGE UP (ref 73–393)
LYMPHOCYTES # BLD AUTO: 1.09 K/UL — SIGNIFICANT CHANGE UP (ref 1–3.3)
LYMPHOCYTES # BLD AUTO: 12.9 % — LOW (ref 13–44)
MCHC RBC-ENTMCNC: 28.4 PG — SIGNIFICANT CHANGE UP (ref 27–34)
MCHC RBC-ENTMCNC: 31.8 GM/DL — LOW (ref 32–36)
MCV RBC AUTO: 89.4 FL — SIGNIFICANT CHANGE UP (ref 80–100)
MONOCYTES # BLD AUTO: 0.93 K/UL — HIGH (ref 0–0.9)
MONOCYTES NFR BLD AUTO: 11 % — SIGNIFICANT CHANGE UP (ref 2–14)
NEUTROPHILS # BLD AUTO: 6.13 K/UL — SIGNIFICANT CHANGE UP (ref 1.8–7.4)
NEUTROPHILS NFR BLD AUTO: 72.8 % — SIGNIFICANT CHANGE UP (ref 43–77)
NITRITE UR-MCNC: NEGATIVE — SIGNIFICANT CHANGE UP
NRBC # BLD: 0 /100 WBCS — SIGNIFICANT CHANGE UP (ref 0–0)
PH UR: 6 — SIGNIFICANT CHANGE UP (ref 5–8)
PLATELET # BLD AUTO: 234 K/UL — SIGNIFICANT CHANGE UP (ref 150–400)
POTASSIUM SERPL-MCNC: 4.4 MMOL/L — SIGNIFICANT CHANGE UP (ref 3.5–5.3)
POTASSIUM SERPL-SCNC: 4.4 MMOL/L — SIGNIFICANT CHANGE UP (ref 3.5–5.3)
PROT SERPL-MCNC: 7 G/DL — SIGNIFICANT CHANGE UP (ref 6–8.3)
PROT UR-MCNC: 30 MG/DL
RBC # BLD: 4.33 M/UL — SIGNIFICANT CHANGE UP (ref 4.2–5.8)
RBC # FLD: 13.2 % — SIGNIFICANT CHANGE UP (ref 10.3–14.5)
SARS-COV-2 RNA SPEC QL NAA+PROBE: SIGNIFICANT CHANGE UP
SODIUM SERPL-SCNC: 140 MMOL/L — SIGNIFICANT CHANGE UP (ref 135–145)
SP GR SPEC: 1.01 — SIGNIFICANT CHANGE UP (ref 1.01–1.02)
UROBILINOGEN FLD QL: NEGATIVE — SIGNIFICANT CHANGE UP
WBC # BLD: 8.43 K/UL — SIGNIFICANT CHANGE UP (ref 3.8–10.5)
WBC # FLD AUTO: 8.43 K/UL — SIGNIFICANT CHANGE UP (ref 3.8–10.5)

## 2022-04-11 PROCEDURE — 83605 ASSAY OF LACTIC ACID: CPT

## 2022-04-11 PROCEDURE — 87086 URINE CULTURE/COLONY COUNT: CPT

## 2022-04-11 PROCEDURE — 93010 ELECTROCARDIOGRAM REPORT: CPT

## 2022-04-11 PROCEDURE — 80076 HEPATIC FUNCTION PANEL: CPT

## 2022-04-11 PROCEDURE — 81001 URINALYSIS AUTO W/SCOPE: CPT

## 2022-04-11 PROCEDURE — 93005 ELECTROCARDIOGRAM TRACING: CPT

## 2022-04-11 PROCEDURE — 96374 THER/PROPH/DIAG INJ IV PUSH: CPT | Mod: XU

## 2022-04-11 PROCEDURE — 74176 CT ABD & PELVIS W/O CONTRAST: CPT | Mod: MA

## 2022-04-11 PROCEDURE — 99285 EMERGENCY DEPT VISIT HI MDM: CPT

## 2022-04-11 PROCEDURE — 87186 SC STD MICRODIL/AGAR DIL: CPT

## 2022-04-11 PROCEDURE — 87635 SARS-COV-2 COVID-19 AMP PRB: CPT

## 2022-04-11 PROCEDURE — 74176 CT ABD & PELVIS W/O CONTRAST: CPT | Mod: 26,MA

## 2022-04-11 PROCEDURE — 99285 EMERGENCY DEPT VISIT HI MDM: CPT | Mod: 25

## 2022-04-11 PROCEDURE — 36415 COLL VENOUS BLD VENIPUNCTURE: CPT

## 2022-04-11 PROCEDURE — 85025 COMPLETE CBC W/AUTO DIFF WBC: CPT

## 2022-04-11 PROCEDURE — 83690 ASSAY OF LIPASE: CPT

## 2022-04-11 PROCEDURE — 80048 BASIC METABOLIC PNL TOTAL CA: CPT

## 2022-04-11 RX ORDER — SODIUM CHLORIDE 9 MG/ML
1000 INJECTION INTRAMUSCULAR; INTRAVENOUS; SUBCUTANEOUS ONCE
Refills: 0 | Status: COMPLETED | OUTPATIENT
Start: 2022-04-11 | End: 2022-04-11

## 2022-04-11 RX ORDER — CEFTRIAXONE 500 MG/1
1000 INJECTION, POWDER, FOR SOLUTION INTRAMUSCULAR; INTRAVENOUS ONCE
Refills: 0 | Status: COMPLETED | OUTPATIENT
Start: 2022-04-11 | End: 2022-04-11

## 2022-04-11 RX ORDER — ACETAMINOPHEN 500 MG
650 TABLET ORAL ONCE
Refills: 0 | Status: DISCONTINUED | OUTPATIENT
Start: 2022-04-11 | End: 2022-04-14

## 2022-04-11 RX ORDER — IOHEXOL 300 MG/ML
30 INJECTION, SOLUTION INTRAVENOUS ONCE
Refills: 0 | Status: COMPLETED | OUTPATIENT
Start: 2022-04-11 | End: 2022-04-11

## 2022-04-11 RX ORDER — CEFPODOXIME PROXETIL 100 MG
1 TABLET ORAL
Qty: 20 | Refills: 0
Start: 2022-04-11 | End: 2022-04-20

## 2022-04-11 RX ADMIN — IOHEXOL 30 MILLILITER(S): 300 INJECTION, SOLUTION INTRAVENOUS at 13:56

## 2022-04-11 RX ADMIN — SODIUM CHLORIDE 1000 MILLILITER(S): 9 INJECTION INTRAMUSCULAR; INTRAVENOUS; SUBCUTANEOUS at 13:48

## 2022-04-11 RX ADMIN — CEFTRIAXONE 100 MILLIGRAM(S): 500 INJECTION, POWDER, FOR SOLUTION INTRAMUSCULAR; INTRAVENOUS at 17:09

## 2022-04-11 NOTE — ED PROVIDER NOTE - PATIENT PORTAL LINK FT
You can access the FollowMyHealth Patient Portal offered by [facility] by registering at the following website: [email or web address]. By joining [facility]’s FollowMyHealth portal, you will also be able to view your health information using other applications (apps) compatible with our system.

## 2022-04-11 NOTE — ED PROVIDER NOTE - PHYSICAL EXAMINATION
Afebrile, hemodynamically stable, saturating well  NAD, well appearing, sitting comfortably in bed, no WOB, speaking full sentences  Head NCAT  EOMI grossly, anicteric  MMM  No JVD  RRR, nml S1/S2, no m/r/g  Lungs CTAB, no w/r/r  Abd soft, R flank TTP, NT, ND, nml BS, no rebound or guarding or Ochoa's sign, no CVAT  AAO, CN's 3-12 grossly intact  LOPEZ spontaneously, no leg cyanosis or edema  Skin warm, dry, no rashes or hives

## 2022-04-11 NOTE — ED ADULT NURSE NOTE - OBJECTIVE STATEMENT
Patient presents with intermittent R flank pain radiating to upper abdomen x 10 days. Denies  dysuria, fever.

## 2022-04-11 NOTE — ED PROVIDER NOTE - CLINICAL SUMMARY MEDICAL DECISION MAKING FREE TEXT BOX
Character and appearance low suspicion for dissection or AAA. Character low suspicion for ACS or PE. Character and appearance low suspicion for dissection or AAA. Character low suspicion for ACS or PE. Abdomen benign with low suspicion for acute process and CT negative for this. Noted UTI and symptoms c/w pyelonephritis. No e/o kidney stone. No fever or e/o systemic spread. Kidney infection improved from prior. D/w pt and daughter and offered admission, agrees no need for that at this time and would like to be discharged. Patient is well appearing, NAD, afebrile, hemodynamically stable. Any available tests and studies were discussed with patient and family. Discharged with instructions in further symptomatic care, return precautions, and need for PMD and uro f/u.

## 2022-04-11 NOTE — ED PROVIDER NOTE - OBJECTIVE STATEMENT
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 Associated feeling of being blocked up with need to have BM but only having small BM. Denies all urinary symptoms, CP, SOB, cough, fever, hematochezia, vomiting, and all other symptoms.

## 2022-04-11 NOTE — ED PROVIDER NOTE - MDM PATIENT STATEMENT FOR ADDL TREATMENT
Patient with one or more new problems requiring additional work-up/treatment.
bilateral lower extremity Passive ROM was WFL (within functional limits)/bilateral upper extremity Passive ROM was WFL (within functional limits)

## 2022-04-11 NOTE — ED ADULT TRIAGE NOTE - CHIEF COMPLAINT QUOTE
as per the pt " latonya having the rt lower abdominal pain radiating to the back getting worse over 10 days "

## 2022-04-11 NOTE — ED PROVIDER NOTE - NSFOLLOWUPINSTRUCTIONS_ED_ALL_ED_FT
Please follow up with your primary care doctor and urologist in 1-2 days.  Please use acetaminophen as needed for pain.  Please keep well hydrated.  Please return to the emergency department if you have worsening pain, vomiting, fever, or any other symptoms.      Kidney Infection    WHAT YOU NEED TO KNOW:    A kidney infection, or pyelonephritis, is a bacterial infection. The infection usually starts in your bladder or urethra and moves into your kidney. One or both kidneys may be infected.   Kidney, Ureters, Bladder     DISCHARGE INSTRUCTIONS:    Return to the emergency department if:   •You have a fever and chills.   •You cannot stop vomiting.  •You have severe pain in your abdomen, lower back, or sides.    Contact your healthcare provider if:   •You continue to have a fever after you take antibiotics for 3 days.  •You have pain when you urinate, even after treatment.  •Your signs and symptoms return.  •You have questions or concerns about your condition or care.    Medicines: You may need any of the following:   •Antibiotics treat your bacterial infection.   •Acetaminophen decreases pain and fever. It is available without a doctor's order. Ask how much to take and how often to take it. Follow directions. Read the labels of all other medicines you are using to see if they also contain acetaminophen, or ask your doctor or pharmacist. Acetaminophen can cause liver damage if not taken correctly. Do not use more than 4 grams (4,000 milligrams) total of acetaminophen in one day.  •NSAIDs, such as ibuprofen, help decrease swelling, pain, and fever. This medicine is available with or without a doctor's order. NSAIDs can cause stomach bleeding or kidney problems in certain people. If you take blood thinner medicine, always ask if NSAIDs are safe for you. Always read the medicine label and follow directions. Do not give these medicines to children under 6 months of age without direction from your child's healthcare provider.  •Prescription pain medicine may be given. Ask how to take this medicine safely.  •Take your medicine as directed. Contact your healthcare provider if you think your medicine is not helping or if you have side effects. Tell him of her if you are allergic to any medicine. Keep a list of the medicines, vitamins, and herbs you take. Include the amounts, and when and why you take them. Bring the list or the pill bottles to follow-up visits. Carry your medicine list with you in case of an emergency.    Drink liquids as directed: You may need to drink extra liquids to help flush your kidneys and urinary system. Water is the best liquid to drink. Ask your healthcare provider how much liquid to drink each day and which liquids are best for you.    Urinate as soon as you feel the urge: This will help flush bacteria from your urinary system. Do not wait or hold your urine for too long.     Clean your genital area every day with soap and water: Wipe from front to back after you urinate or have a bowel movement. Wear cotton underwear. Fabrics such as nylon and polyester can stay damp. This can increase your risk for infection. Urinate within 15 minutes after you have sex.    Follow up with your doctor as directed: Write down your questions so you remember to ask them during your visits.

## 2022-04-12 ENCOUNTER — TRANSCRIPTION ENCOUNTER (OUTPATIENT)
Age: 77
End: 2022-04-12

## 2022-04-13 LAB
-  AMPICILLIN/SULBACTAM: SIGNIFICANT CHANGE UP
-  CEFAZOLIN: SIGNIFICANT CHANGE UP
-  GENTAMICIN: SIGNIFICANT CHANGE UP
-  OXACILLIN: SIGNIFICANT CHANGE UP
-  RIFAMPIN: SIGNIFICANT CHANGE UP
-  TETRACYCLINE: SIGNIFICANT CHANGE UP
-  TRIMETHOPRIM/SULFAMETHOXAZOLE: SIGNIFICANT CHANGE UP
-  VANCOMYCIN: SIGNIFICANT CHANGE UP
CULTURE RESULTS: SIGNIFICANT CHANGE UP
METHOD TYPE: SIGNIFICANT CHANGE UP
ORGANISM # SPEC MICROSCOPIC CNT: SIGNIFICANT CHANGE UP
ORGANISM # SPEC MICROSCOPIC CNT: SIGNIFICANT CHANGE UP
SPECIMEN SOURCE: SIGNIFICANT CHANGE UP

## 2022-04-20 ENCOUNTER — APPOINTMENT (OUTPATIENT)
Dept: UROLOGY | Facility: CLINIC | Age: 77
End: 2022-04-20
Payer: MEDICARE

## 2022-04-20 DIAGNOSIS — G47.33 OBSTRUCTIVE SLEEP APNEA (ADULT) (PEDIATRIC): ICD-10-CM

## 2022-04-20 PROCEDURE — 99214 OFFICE O/P EST MOD 30 MIN: CPT

## 2022-04-20 NOTE — PHYSICAL EXAM
[General Appearance - Well Developed] : well developed [Bowel Sounds] : normal bowel sounds [Urinary Bladder Findings] : the bladder was normal on palpation [Skin Color & Pigmentation] : normal skin color and pigmentation [Heart Rate And Rhythm] : Heart rate and rhythm were normal [General Appearance - Well Nourished] : well nourished [Normal Appearance] : normal appearance [Well Groomed] : well groomed [General Appearance - In No Acute Distress] : no acute distress [Abdomen Soft] : soft [Abdomen Tenderness] : non-tender [Costovertebral Angle Tenderness] : no ~M costovertebral angle tenderness [] : no rash [Edema] : no peripheral edema [Exaggerated Use Of Accessory Muscles For Inspiration] : no accessory muscle use [Respiration, Rhythm And Depth] : normal respiratory rhythm and effort [Oriented To Time, Place, And Person] : oriented to person, place, and time [Affect] : the affect was normal [Mood] : the mood was normal [Not Anxious] : not anxious [Normal Station and Gait] : the gait and station were normal for the patient's age [No Focal Deficits] : no focal deficits [No Palpable Adenopathy] : no palpable adenopathy

## 2022-04-20 NOTE — ASSESSMENT
[FreeTextEntry1] : Very pleasant 76-year-old gentleman who presents for follow-up of kidney stones, renal cysts, recent urinary tract infection\par -CT images from 4/11/22 reviewed, no nephrolithiasis. No hydronephrosis. simple renal cysts. \par -Urinalysis 4/11/22 positive for urinary tract infection\par -Urine clx 4/11/22 reviewed, Staph aureus\par -Patient is finishing a 10 day course of cefpodoxime \par -Repeat urine culture next week\par -We discussed general preventative strategies for urinary tract infections\par -Patient reports that he is voiding well and without difficulty\par -Follow-up in 6 months\par \par \par

## 2022-04-20 NOTE — HISTORY OF PRESENT ILLNESS
[FreeTextEntry1] : Very pleasant 76-year-old gentleman who presents for follow-up of kidney stones and renal cysts. He feels well. He denies dysuria. No hematuria. No flank pain or suprapubic pain. No nausea or vomiting. He was recently seen in the ED for urinary tract infection and started on antibiotics. UA positive and Urine culture with Staph aureus. CT at that time showed no obstructing stones. \par Feels better now. Denies dysuria, gross hematuria, fever and chills. \par \par

## 2022-05-03 NOTE — PHYSICAL THERAPY INITIAL EVALUATION ADULT - PHYSICAL ASSIST/NONPHYSICAL ASSIST: STAND/SIT, REHAB EVAL
46 year old male year old male  who presents today at the request of PCP for a colorectal cancer screening consultation.  He denies having a colonoscopy or sigmoidoscopy before.   Currently, admits 1-2 bowel movements per day without strain.  Stools are normal and formed. Denies melena, blood or mucus in stool, rectal pain/bleeding or pruritus ani.   Denies a family history of colon, gastric, or esophageal cancer/polyps or disease.
verbal cues/1 person assist/nonverbal cues (demo/gestures)

## 2022-09-02 NOTE — H&P ADULT - I WAS PHYSICALLY PRESENT FOR THE KEY PORTIONS OF THE EVALUATION AND MANAGEMENT (E/M) SERVICE PROVIDED.  I AGREE WITH THE ABOVE HISTORY, PHYSICAL, AND PLAN WHICH I HAVE REVIEWED AND EDITED WHERE APPROPRIATE
We reviewed the pathophysiology of posterior vitreous detachment and the occasional association with retinal tear and retinal detachment. Statement Selected

## 2022-09-07 ENCOUNTER — APPOINTMENT (OUTPATIENT)
Dept: UROLOGY | Facility: CLINIC | Age: 77
End: 2022-09-07

## 2022-09-07 PROCEDURE — 99213 OFFICE O/P EST LOW 20 MIN: CPT

## 2022-09-07 PROCEDURE — 76775 US EXAM ABDO BACK WALL LIM: CPT

## 2022-09-07 NOTE — ASSESSMENT
[FreeTextEntry1] : Very pleasant 76-year-old gentleman presents for follow-up of renal cysts, history of kidney stones\par -Renal ultrasound images reviewed demonstrating bilateral renal cysts but no evidence of kidney stones\par -Repeat renal ultrasound in 1 year and follow-up at that time

## 2022-09-07 NOTE — HISTORY OF PRESENT ILLNESS
[FreeTextEntry1] : Very pleasant 76-year-old gentleman who presents for follow-up of history of kidney stones, renal cysts.  He feels well.  He denies dysuria.  No hematuria.  No flank pain or suprapubic pain.  Creatinine most recently noted to decrease to 1.56.  Renal ultrasound today demonstrates no kidney stones, hydronephrosis, or renal masses, but it does demonstrate bilateral simple renal cysts.

## 2022-09-07 NOTE — PHYSICAL EXAM
[General Appearance - Well Developed] : well developed [General Appearance - Well Nourished] : well nourished [Normal Appearance] : normal appearance [Well Groomed] : well groomed [General Appearance - In No Acute Distress] : no acute distress [Bowel Sounds] : normal bowel sounds [Abdomen Soft] : soft [Abdomen Tenderness] : non-tender [Costovertebral Angle Tenderness] : no ~M costovertebral angle tenderness [Urinary Bladder Findings] : the bladder was normal on palpation [Skin Color & Pigmentation] : normal skin color and pigmentation [Heart Rate And Rhythm] : Heart rate and rhythm were normal [Edema] : no peripheral edema [] : no respiratory distress [Respiration, Rhythm And Depth] : normal respiratory rhythm and effort [Exaggerated Use Of Accessory Muscles For Inspiration] : no accessory muscle use [Oriented To Time, Place, And Person] : oriented to person, place, and time [Affect] : the affect was normal [Mood] : the mood was normal [Not Anxious] : not anxious [Normal Station and Gait] : the gait and station were normal for the patient's age [No Focal Deficits] : no focal deficits [No Palpable Adenopathy] : no palpable adenopathy

## 2022-09-27 ENCOUNTER — NON-APPOINTMENT (OUTPATIENT)
Age: 77
End: 2022-09-27

## 2022-09-27 ENCOUNTER — APPOINTMENT (OUTPATIENT)
Dept: INTERNAL MEDICINE | Facility: CLINIC | Age: 77
End: 2022-09-27

## 2022-09-27 VITALS
SYSTOLIC BLOOD PRESSURE: 118 MMHG | HEART RATE: 72 BPM | OXYGEN SATURATION: 98 % | RESPIRATION RATE: 16 BRPM | TEMPERATURE: 97.8 F | DIASTOLIC BLOOD PRESSURE: 66 MMHG | BODY MASS INDEX: 30.05 KG/M2 | WEIGHT: 176 LBS | HEIGHT: 64 IN

## 2022-09-27 PROCEDURE — 93000 ELECTROCARDIOGRAM COMPLETE: CPT

## 2022-09-27 PROCEDURE — 99214 OFFICE O/P EST MOD 30 MIN: CPT | Mod: 25

## 2022-09-27 RX ORDER — CEFPODOXIME PROXETIL 200 MG/1
200 TABLET, FILM COATED ORAL
Qty: 20 | Refills: 0 | Status: DISCONTINUED | COMMUNITY
Start: 2022-04-11

## 2022-09-27 NOTE — ASSESSMENT
[FreeTextEntry1] : 77 year old male found to have stable Essential Hypertension, CAD, Chronic Renal Failure stage -3, Hypercholesterolemia with Hypertriglyceridemia, Acquired Hypothyroidism, Elevated Hemoglobin A1c, Iron Deficiency Anemia, with the current prescription regimen as recommended, diet and life style modifications, as counseled. Prior results reviewed, interpreted and discussed with the patient during today's examination, as appropriate. Follow up, treatment plan and tests, as ordered.\par \par EKG:\par Sinus Rhythm @ 66 BPM.\par Known LAD, no new ST-T changes noted.\par \par Total time spent : 30 minutes\par Including:\par Preparation prior to visit - Reviewing prior record, results of tests and Consultation Reports as applicable\par Conducting an appropriate H & P during today's encounter\par Appropriate orders for tests, medications and procedures, as applicable\par Counseling patient \par Note completion\par \par

## 2022-09-27 NOTE — HEALTH RISK ASSESSMENT
[Former] : Former [No] : In the past 12 months have you used drugs other than those required for medical reasons? No [No falls in past year] : Patient reported no falls in the past year [0] : 2) Feeling down, depressed, or hopeless: Not at all (0) [de-identified] : URO [[id number]] : 0

## 2022-09-27 NOTE — HISTORY OF PRESENT ILLNESS
[de-identified] : 77 year old  male patient with history of stable Essential Hypertension, CAD, Chronic Renal Failure stage -3, Hypercholesterolemia with Hypertriglyceridemia, Acquired Hypothyroidism, history as stated, presented for follow up examination. Patient is compliant with all medications. Denies shortness of breath, chest pain or abdominal pains at this time. ROS as stated.\par

## 2022-10-14 ENCOUNTER — TRANSCRIPTION ENCOUNTER (OUTPATIENT)
Age: 77
End: 2022-10-14

## 2022-10-14 LAB
25(OH)D3 SERPL-MCNC: 36.5 NG/ML
ALBUMIN SERPL ELPH-MCNC: 3.7 G/DL
ALP BLD-CCNC: 86 U/L
ALT SERPL-CCNC: 11 U/L
ANION GAP SERPL CALC-SCNC: 13 MMOL/L
APPEARANCE: CLEAR
AST SERPL-CCNC: 16 U/L
BACTERIA: NEGATIVE
BASOPHILS # BLD AUTO: 0.09 K/UL
BASOPHILS NFR BLD AUTO: 1.1 %
BILIRUB SERPL-MCNC: 0.6 MG/DL
BILIRUBIN URINE: NEGATIVE
BLOOD URINE: NEGATIVE
BUN SERPL-MCNC: 10 MG/DL
CALCIUM SERPL-MCNC: 9.7 MG/DL
CHLORIDE SERPL-SCNC: 104 MMOL/L
CHOLEST SERPL-MCNC: 151 MG/DL
CO2 SERPL-SCNC: 22 MMOL/L
COLOR: NORMAL
CREAT SERPL-MCNC: 1.69 MG/DL
CREAT SPEC-SCNC: 53 MG/DL
EGFR: 41 ML/MIN/1.73M2
EOSINOPHIL # BLD AUTO: 0.45 K/UL
EOSINOPHIL NFR BLD AUTO: 5.3 %
ESTIMATED AVERAGE GLUCOSE: 126 MG/DL
GGT SERPL-CCNC: 24 U/L
GLUCOSE QUALITATIVE U: NEGATIVE
GLUCOSE SERPL-MCNC: 112 MG/DL
HBA1C MFR BLD HPLC: 6 %
HCT VFR BLD CALC: 41 %
HDLC SERPL-MCNC: 53 MG/DL
HGB BLD-MCNC: 12.7 G/DL
HYALINE CASTS: 0 /LPF
IMM GRANULOCYTES NFR BLD AUTO: 0.2 %
IRON SATN MFR SERPL: 22 %
IRON SERPL-MCNC: 77 UG/DL
KETONES URINE: NEGATIVE
LDLC SERPL CALC-MCNC: 76 MG/DL
LEUKOCYTE ESTERASE URINE: NEGATIVE
LYMPHOCYTES # BLD AUTO: 1.49 K/UL
LYMPHOCYTES NFR BLD AUTO: 17.6 %
MAN DIFF?: NORMAL
MCHC RBC-ENTMCNC: 28.6 PG
MCHC RBC-ENTMCNC: 31 GM/DL
MCV RBC AUTO: 92.3 FL
MICROALBUMIN 24H UR DL<=1MG/L-MCNC: <1.2 MG/DL
MICROALBUMIN/CREAT 24H UR-RTO: NORMAL MG/G
MICROSCOPIC-UA: NORMAL
MONOCYTES # BLD AUTO: 0.8 K/UL
MONOCYTES NFR BLD AUTO: 9.4 %
NEUTROPHILS # BLD AUTO: 5.64 K/UL
NEUTROPHILS NFR BLD AUTO: 66.4 %
NITRITE URINE: NEGATIVE
NONHDLC SERPL-MCNC: 98 MG/DL
PH URINE: 6.5
PLATELET # BLD AUTO: 233 K/UL
POTASSIUM SERPL-SCNC: 4.3 MMOL/L
PROT SERPL-MCNC: 6.4 G/DL
PROTEIN URINE: NEGATIVE
RBC # BLD: 4.44 M/UL
RBC # FLD: 14 %
RED BLOOD CELLS URINE: 1 /HPF
SODIUM SERPL-SCNC: 140 MMOL/L
SPECIFIC GRAVITY URINE: 1.01
SQUAMOUS EPITHELIAL CELLS: 0 /HPF
T3 SERPL-MCNC: 98 NG/DL
T4 FREE SERPL-MCNC: 2.1 NG/DL
TIBC SERPL-MCNC: 349 UG/DL
TRIGL SERPL-MCNC: 113 MG/DL
TSH SERPL-ACNC: 0.36 UIU/ML
UIBC SERPL-MCNC: 271 UG/DL
UROBILINOGEN URINE: NORMAL
WBC # FLD AUTO: 8.49 K/UL
WHITE BLOOD CELLS URINE: 0 /HPF

## 2022-10-31 ENCOUNTER — TRANSCRIPTION ENCOUNTER (OUTPATIENT)
Age: 77
End: 2022-10-31

## 2022-11-22 NOTE — ED ADULT TRIAGE NOTE - BSA (M2)
QUICK CARE TELEHEALTH DISQUALIFIED PROGRESS NOTE    Upon review of the patient's responses to the questionnaire, reason for visit and a review of the medical record, this visit has been disqualified and cannot be evaluated as part of our Quick Care Telehealth visits.     Rationale for Disqualification: Patient requires a face to face visit.    Patient previously submitted E visit for back pain and advised to be seen in person.     Chief Complaint   Patient presents with   • Back Pain   • Video Visit     I am experiencing back pain that has been going in for 2 weeks now. I will need a prescription for my pain.        The patient will be contacted to notify the patient of my disposition recommendation. RN to notify patient.     JEREMIE Hare    
1.99

## 2023-02-08 ENCOUNTER — TRANSCRIPTION ENCOUNTER (OUTPATIENT)
Age: 78
End: 2023-02-08

## 2023-02-08 NOTE — DISCHARGE NOTE PROVIDER - HOSPITAL COURSE
4M PMHx HTN, HLD, CAD s/p stent, prediabetes, congenital atrophic R kidney, previous kidney stones, presents as transfer from [facility] w R hydronephrosis, urosepsis 2/2 R distal ureteral stones. Patient underwent IR guided percutaneous nephrostomy tube placement on Friday 11/1 with IR.   BCx+ for E.Coli bacteremia. Urine cultures + for E.Coli pt was started on Cefepime. A leslie catheter was placed. Pt received 6 days of Cefepime and was then transitioned to oral levofloxacin which pt will continue for a completion of 14 days. Pt will go home with the nephrostomy tube and drain and leslie cathter and an appointment was made for him to follow up with urologist on 11/20. Pt is now medically stable for discharge home. Quality 137: Melanoma: Continuity Of Care - Recall System: Patient information entered into a recall system that includes: target date for the next exam specified AND a process to follow up with patients regarding missed or unscheduled appointments When Should The Patient Follow-Up For Their Next Full-Body Skin Exam?: 6 Months Detail Level: Detailed Detail Level: Zone 73 yo M w/ HTN, HLD, CAD s/p stent, prediabetes, congenital atrophic R kidney, previous kidney stones, presents as transfer from [facility] w severe R hydroureteronephrosis, obstructive uropathy 2/2 R distal ureteral stones, initially admitted to MICU with septic shock 2/2 E.coli bacteremia (likely from urinary source). Patient underwent IR guided percutaneous nephrostomy tube placement on Friday 11/1 with IR. Patient successfully weaned off pressors (shock resolved) and for the E.Coli bacteremia, pt was started on Cefepime, transitioned to oral levofloxacin (renally dosed) to complete a total of 14 days of antibiotics for his bactremia. His Cr improved, and pt will go home with the R nephrostomy tube and drain and leslie catheter and an appointment was made for him to follow up with urologist on 11/20. Pt is now medically stable for discharge home, patient and family in agreement. Detail Level: Simple

## 2023-03-30 ENCOUNTER — APPOINTMENT (OUTPATIENT)
Dept: INTERNAL MEDICINE | Facility: CLINIC | Age: 78
End: 2023-03-30
Payer: MEDICARE

## 2023-04-27 ENCOUNTER — APPOINTMENT (OUTPATIENT)
Dept: INTERNAL MEDICINE | Facility: CLINIC | Age: 78
End: 2023-04-27
Payer: MEDICARE

## 2023-04-27 VITALS
WEIGHT: 180 LBS | BODY MASS INDEX: 30.73 KG/M2 | HEART RATE: 70 BPM | TEMPERATURE: 98 F | OXYGEN SATURATION: 99 % | RESPIRATION RATE: 16 BRPM | SYSTOLIC BLOOD PRESSURE: 107 MMHG | HEIGHT: 64 IN | DIASTOLIC BLOOD PRESSURE: 60 MMHG

## 2023-04-27 DIAGNOSIS — Z00.00 ENCOUNTER FOR GENERAL ADULT MEDICAL EXAMINATION W/OUT ABNORMAL FINDINGS: ICD-10-CM

## 2023-04-27 PROCEDURE — G0439: CPT

## 2023-04-27 NOTE — HEALTH RISK ASSESSMENT
[FreeTextEntry1] : Check up\par  [de-identified] : None [[id number]] : 0 [Change in mental status noted] : No change in mental status noted [Reports changes in hearing] : Reports no changes in hearing [Reports changes in vision] : Reports no changes in vision [Reports changes in dental health] : Reports no changes in dental health [ColonoscopyDate] : 09/21 [AdvancecareDate] : 03/23

## 2023-04-27 NOTE — HISTORY OF PRESENT ILLNESS
[de-identified] : 77 year old male patient with history of stable Essential Hypertension, CAD, Chronic Renal Failure stage -3, Hypercholesterolemia with Hypertriglyceridemia, Acquired Hypothyroidism, Elevated Hemoglobin A1c, Iron Deficiency Anemia, history as stated, presented for an annual preventative examination.\par

## 2023-04-27 NOTE — ASSESSMENT
[FreeTextEntry1] : 77 year old male found to have stable Essential Hypertension, CAD, Chronic Renal Failure stage -3, Hypercholesterolemia with Hypertriglyceridemia, Acquired Hypothyroidism, Elevated Hemoglobin A1c, Iron Deficiency Anemia,with the current prescription regimen as recommended, diet and life style modifications, as counseled. Prior results reviewed, interpreted and discussed with the patient during today's examination, as appropriate. Follow up, treatment plan and tests, as ordered.\par \par

## 2023-05-08 LAB
ALBUMIN SERPL ELPH-MCNC: 3.9 G/DL
ALP BLD-CCNC: 100 U/L
ALT SERPL-CCNC: 14 U/L
ANION GAP SERPL CALC-SCNC: 15 MMOL/L
AST SERPL-CCNC: 19 U/L
BASOPHILS # BLD AUTO: 0.1 K/UL
BASOPHILS NFR BLD AUTO: 1.1 %
BILIRUB SERPL-MCNC: 0.5 MG/DL
BUN SERPL-MCNC: 11 MG/DL
CALCIUM SERPL-MCNC: 9.3 MG/DL
CHLORIDE SERPL-SCNC: 108 MMOL/L
CHOLEST SERPL-MCNC: 143 MG/DL
CO2 SERPL-SCNC: 21 MMOL/L
CREAT SERPL-MCNC: 1.65 MG/DL
EGFR: 42 ML/MIN/1.73M2
EOSINOPHIL # BLD AUTO: 0.52 K/UL
EOSINOPHIL NFR BLD AUTO: 5.9 %
ESTIMATED AVERAGE GLUCOSE: 123 MG/DL
GGT SERPL-CCNC: 23 U/L
GLUCOSE SERPL-MCNC: 108 MG/DL
HBA1C MFR BLD HPLC: 5.9 %
HCT VFR BLD CALC: 40 %
HDLC SERPL-MCNC: 48 MG/DL
HGB BLD-MCNC: 12.1 G/DL
IMM GRANULOCYTES NFR BLD AUTO: 0.2 %
IRON SATN MFR SERPL: 14 %
IRON SERPL-MCNC: 50 UG/DL
LDLC SERPL CALC-MCNC: 69 MG/DL
LYMPHOCYTES # BLD AUTO: 1.56 K/UL
LYMPHOCYTES NFR BLD AUTO: 17.7 %
MAN DIFF?: NORMAL
MCHC RBC-ENTMCNC: 28.4 PG
MCHC RBC-ENTMCNC: 30.3 GM/DL
MCV RBC AUTO: 93.9 FL
MONOCYTES # BLD AUTO: 0.74 K/UL
MONOCYTES NFR BLD AUTO: 8.4 %
NEUTROPHILS # BLD AUTO: 5.89 K/UL
NEUTROPHILS NFR BLD AUTO: 66.7 %
NONHDLC SERPL-MCNC: 95 MG/DL
PLATELET # BLD AUTO: 235 K/UL
POTASSIUM SERPL-SCNC: 4.1 MMOL/L
PROT SERPL-MCNC: 6.5 G/DL
RBC # BLD: 4.26 M/UL
RBC # FLD: 13.6 %
SODIUM SERPL-SCNC: 144 MMOL/L
T3 SERPL-MCNC: 108 NG/DL
T4 FREE SERPL-MCNC: 3.2 NG/DL
TIBC SERPL-MCNC: 348 UG/DL
TRIGL SERPL-MCNC: 132 MG/DL
TSH SERPL-ACNC: 0.06 UIU/ML
UIBC SERPL-MCNC: 298 UG/DL
WBC # FLD AUTO: 8.83 K/UL

## 2023-05-08 NOTE — ED ADULT NURSE NOTE - CAS EDN DISCHARGE ASSESSMENT
Alert and oriented to person, place and time PAST MEDICAL HISTORY:  DM (diabetes mellitus)     DM (diabetes mellitus)     Essential hypertension     HTN (hypertension)     Stroke     Uncomplicated asthma, unspecified asthma severity

## 2023-05-10 ENCOUNTER — APPOINTMENT (OUTPATIENT)
Dept: COLORECTAL SURGERY | Facility: CLINIC | Age: 78
End: 2023-05-10
Payer: MEDICARE

## 2023-05-10 VITALS
HEIGHT: 64 IN | SYSTOLIC BLOOD PRESSURE: 99 MMHG | DIASTOLIC BLOOD PRESSURE: 59 MMHG | HEART RATE: 76 BPM | WEIGHT: 180 LBS | BODY MASS INDEX: 30.73 KG/M2

## 2023-05-10 PROCEDURE — 99203 OFFICE O/P NEW LOW 30 MIN: CPT | Mod: 25

## 2023-05-10 PROCEDURE — 46221 LIGATION OF HEMORRHOID(S): CPT

## 2023-05-11 NOTE — PHYSICAL EXAM
[Abdomen Masses] : No abdominal masses [JVD] : no jugular venous distention  [Wheezing] : no wheezing was heard [Normal Rate and Rhythm] : normal rate and rhythm [No Rash or Lesion] : No rash or lesion [Alert] : alert [Oriented to Person] : oriented to person [Oriented to Place] : oriented to place [Oriented to Time] : oriented to time [Calm] : calm [de-identified] : NAD [de-identified] : EOMI [de-identified] : LOPEZ no edema

## 2023-05-11 NOTE — CONSULT LETTER
[Dear  ___] : Dear  [unfilled], [Consult Letter:] : I had the pleasure of evaluating your patient, [unfilled]. [Please see my note below.] : Please see my note below. [Consult Closing:] : Thank you very much for allowing me to participate in the care of this patient.  If you have any questions, please do not hesitate to contact me. [Sincerely,] : Sincerely, [FreeTextEntry2] : Thad Ewing [FreeTextEntry3] : Axel Solitario MD FACS\par Chief Colon and Rectal Surgery\par [facility]

## 2023-05-11 NOTE — ASSESSMENT
[FreeTextEntry1] : Bleeding prolapsing internal hemorrhoids\par -We discussed treatment options for internal hemorrhoid disease.\par Fascia recommended patient undergo rubber band ligation. Risks and benefits of the procedure review the patient elected to proceed\par -Rubber band ligation was performed in standard fashion under direct vision with a standard rubber band ligator the left lateral/anterior internal hemorrhoid. Procedure was performed complication or event\par -Recommended patient initiated fiber supplementation\par -Follow up in 2 weeks for reevaluation and possible additional banding

## 2023-05-11 NOTE — HISTORY OF PRESENT ILLNESS
[FreeTextEntry1] : 78 yo male presents with prolapsing tissue with BMs.  Patient reports discomfort with swelling in the area. No fevers or chills no nausea or vomiting. Rare blood per rectum. No thumb no pain no change in bowel habits occasional straining with bowel movements.  History of diverticulosis colonoscopy in 2021. No family history of colon cancer or inflammatory bowel disease

## 2023-05-24 ENCOUNTER — APPOINTMENT (OUTPATIENT)
Dept: COLORECTAL SURGERY | Facility: CLINIC | Age: 78
End: 2023-05-24
Payer: MEDICARE

## 2023-05-24 VITALS
TEMPERATURE: 98 F | WEIGHT: 180 LBS | BODY MASS INDEX: 30.73 KG/M2 | SYSTOLIC BLOOD PRESSURE: 117 MMHG | DIASTOLIC BLOOD PRESSURE: 69 MMHG | HEART RATE: 65 BPM | HEIGHT: 64 IN

## 2023-05-24 PROCEDURE — 46221 LIGATION OF HEMORRHOID(S): CPT

## 2023-05-25 NOTE — PROCEDURE
[FreeTextEntry1] : External anal exam-no masses or lesions prolapsing left lateral tissue Appears improved\par Digital rectal exam normal tone nontender\par Anoscopy-left anterior/lateral bending site appreciated large residual internal hemorrhoids\par \par Rubber band ligation was performed in standard fashion under direct vision using a standard rubber band ligator of the right anterior internal hemorrhoid. Procedure was performed without complication or event

## 2023-05-25 NOTE — ASSESSMENT
[FreeTextEntry1] : Bleeding internal hemorrhoids\par -Patient progressing well\par -Continue high fiber diet/supplement\par -Rubber band ligation performed as described above\par -Follow up in 2 weeks for final banding

## 2023-06-03 NOTE — PATIENT PROFILE ADULT - CENTRAL VENOUS CATHETER/PICC LINE
NAME Eulogio Boo    MRN [id number]    DATE 6/3/2023    Preoperative diagnosis: Appendicitis    Postoperative diagnosis:Same    Procedure: Laparoscopic appendectomy    Anesthesia: General endotracheal     Anesthesiologist: Raquel Padron MD    Findings:  Suppurative appendicitis    Surgeon: THOMAS Stevenson    EBL: <50 ml    Complications:None    Description of the procedure:     With the patient in the supine position, under general anesthesia, the abdomen was prepped and sterile drapes were placed per routine. An infraumbilical incision was made. The fascia was secured with 2-0 Vicryl suture. The fascia was incised and the peritoneal cavity was entered. A Maricel canula was inserted and pneumoperitoneum was instituted.    Additional 5 mm trocars were placed under direct visualization in the suprapubic and LLQ regions. The appendix was grasped and the mesoappendix was dissected. The artery was cauterized and transected with ligasure. The appendiceal base was ligated with laparoscopic stapler .    The appendix was placed in an endopouch and removed from the abdomen. Hemostasis was adequate.     All ports were injected with bupivicaine solution. The trocars were removed under direct visualization and hemostasis was adequate. The fascia was closed with 0 Vicryl and the skin with 4-0 Monocryl.    The patient tolerated the procedure well   .  The patient was moved to PACU .    Erik Stevenson M.D.  Acute Care Surgery  [phone]       no

## 2023-06-07 ENCOUNTER — APPOINTMENT (OUTPATIENT)
Dept: COLORECTAL SURGERY | Facility: CLINIC | Age: 78
End: 2023-06-07
Payer: MEDICARE

## 2023-06-07 VITALS
HEART RATE: 75 BPM | BODY MASS INDEX: 30.73 KG/M2 | WEIGHT: 180 LBS | DIASTOLIC BLOOD PRESSURE: 71 MMHG | HEIGHT: 64 IN | SYSTOLIC BLOOD PRESSURE: 126 MMHG

## 2023-06-07 DIAGNOSIS — K64.9 UNSPECIFIED HEMORRHOIDS: ICD-10-CM

## 2023-06-07 PROCEDURE — 46221 LIGATION OF HEMORRHOID(S): CPT

## 2023-06-07 NOTE — ASSESSMENT
[FreeTextEntry1] : bleeding/prolapsing internal hemorhroid\par -RBL performed as described\par -continue fiber\par -f/u in 4 weeks if persistent bleeding\par -We discussed treatment going forward.  Persistent hemorrhoidal tissue noted, but pt is relatively asymptomatic.  Would observe, unless it becomes symptomatic

## 2023-06-07 NOTE — PROCEDURE
[FreeTextEntry1] : ext anal exam-mild RA mucosal prolapse, PML skin tag/papilla\par hemant-normal tone, NT\par anoscopy-prior banding site noted in LL and RA position.  Smaller RP hemorrhoid w/ persistent RA hemorrhoid\par \par Rubber band ligation was performed in standard fashion under direct vision of the RA internal hemorrhoid w/ standard rubber band ligator.  procedure performed w/o complication

## 2023-06-07 NOTE — PHYSICAL THERAPY INITIAL EVALUATION ADULT - ASSISTIVE DEVICE FOR TRANSFER: SIT/STAND, REHAB EVAL
rolling walker Topical Ketoconazole Counseling: Patient counseled that this medication may cause skin irritation or allergic reactions.  In the event of skin irritation, the patient was advised to reduce the amount of the drug applied or use it less frequently.   The patient verbalized understanding of the proper use and possible adverse effects of ketoconazole.  All of the patient's questions and concerns were addressed.

## 2023-07-01 NOTE — PROGRESS NOTE ADULT - ATTENDING COMMENTS
- - - Monitor renal function/UOP, transitioned to renally dosed PO abx (levaquin) to complete 14 day course of antibiotics for bacteremia, PT recs home w/ otpt PT, Will need otpt urology followup, likely will also need home services re: nephrostomy care Patient, transitioned to renally dosed PO abx (levaquin) to complete 14 day course of antibiotics for bacteremia, PT recs home w/ otpt PT, I called and made an appointment for patient with urology (will leave with leslie and R nephrostomy tube in place), PMD Dr. Ewing updated, appreciate CM/SW assistance with services (re: nephrostomy/leslie care, otpt PT), patient/wife/daughter updated and in agreement with discharge plan, anticipatory guidance provided.     Discharge time 40 minutes

## 2023-07-26 ENCOUNTER — APPOINTMENT (OUTPATIENT)
Dept: COLORECTAL SURGERY | Facility: CLINIC | Age: 78
End: 2023-07-26

## 2023-09-18 NOTE — DISCHARGE NOTE NURSING/CASE MANAGEMENT/SOCIAL WORK - PATIENT PORTAL LINK FT
You can access the FollowMyHealth Patient Portal offered by [facility] by registering at the following website: [email or web address]. By joining [employer]’s FollowMyHealth portal, you will also be able to view your health information using other applications (apps) compatible with our system. Graft Cartilage Fenestration Text: The cartilage was fenestrated with a 2mm punch biopsy to help facilitate graft survival and healing.

## 2023-09-20 ENCOUNTER — APPOINTMENT (OUTPATIENT)
Dept: UROLOGY | Facility: CLINIC | Age: 78
End: 2023-09-20

## 2023-10-19 ENCOUNTER — NON-APPOINTMENT (OUTPATIENT)
Age: 78
End: 2023-10-19

## 2023-10-19 ENCOUNTER — APPOINTMENT (OUTPATIENT)
Dept: INTERNAL MEDICINE | Facility: CLINIC | Age: 78
End: 2023-10-19
Payer: MEDICARE

## 2023-10-19 VITALS
TEMPERATURE: 98 F | BODY MASS INDEX: 30.73 KG/M2 | DIASTOLIC BLOOD PRESSURE: 75 MMHG | SYSTOLIC BLOOD PRESSURE: 119 MMHG | RESPIRATION RATE: 97 BRPM | WEIGHT: 180 LBS | HEIGHT: 64 IN | HEART RATE: 69 BPM

## 2023-10-19 DIAGNOSIS — N18.30 CHRONIC KIDNEY DISEASE, STAGE 3 UNSPECIFIED: ICD-10-CM

## 2023-10-19 DIAGNOSIS — R73.09 OTHER ABNORMAL GLUCOSE: ICD-10-CM

## 2023-10-19 DIAGNOSIS — M79.604 PAIN IN RIGHT LEG: ICD-10-CM

## 2023-10-19 DIAGNOSIS — M79.605 PAIN IN RIGHT LEG: ICD-10-CM

## 2023-10-19 DIAGNOSIS — E78.2 MIXED HYPERLIPIDEMIA: ICD-10-CM

## 2023-10-19 DIAGNOSIS — E03.9 HYPOTHYROIDISM, UNSPECIFIED: ICD-10-CM

## 2023-10-19 PROCEDURE — 99214 OFFICE O/P EST MOD 30 MIN: CPT | Mod: 25

## 2023-10-19 PROCEDURE — 36415 COLL VENOUS BLD VENIPUNCTURE: CPT

## 2023-10-19 PROCEDURE — 93000 ELECTROCARDIOGRAM COMPLETE: CPT

## 2023-10-20 LAB
ALBUMIN SERPL ELPH-MCNC: 3.7 G/DL
ALP BLD-CCNC: 97 U/L
ALT SERPL-CCNC: 15 U/L
ANION GAP SERPL CALC-SCNC: 11 MMOL/L
APPEARANCE: ABNORMAL
AST SERPL-CCNC: 21 U/L
BACTERIA: ABNORMAL /HPF
BASOPHILS # BLD AUTO: 0.1 K/UL
BASOPHILS NFR BLD AUTO: 1 %
BILIRUB SERPL-MCNC: 0.4 MG/DL
BILIRUBIN URINE: NEGATIVE
BLOOD URINE: ABNORMAL
BUN SERPL-MCNC: 18 MG/DL
CALCIUM SERPL-MCNC: 9.6 MG/DL
CHLORIDE SERPL-SCNC: 107 MMOL/L
CHOLEST SERPL-MCNC: 136 MG/DL
CO2 SERPL-SCNC: 21 MMOL/L
COLOR: YELLOW
CREAT SERPL-MCNC: 1.75 MG/DL
CREAT SPEC-SCNC: 128 MG/DL
EGFR: 39 ML/MIN/1.73M2
EOSINOPHIL # BLD AUTO: 0.64 K/UL
EOSINOPHIL NFR BLD AUTO: 6.6 %
ESTIMATED AVERAGE GLUCOSE: 126 MG/DL
GGT SERPL-CCNC: 28 U/L
GLUCOSE QUALITATIVE U: NEGATIVE
GLUCOSE SERPL-MCNC: 92 MG/DL
HBA1C MFR BLD HPLC: 6 %
HCT VFR BLD CALC: 35.5 %
HDLC SERPL-MCNC: 46 MG/DL
HGB BLD-MCNC: 10.9 G/DL
HYALINE CASTS: NORMAL
IMM GRANULOCYTES NFR BLD AUTO: 0.4 %
IRON SATN MFR SERPL: 7 %
IRON SERPL-MCNC: 25 UG/DL
KETONES URINE: NEGATIVE
LDLC SERPL CALC-MCNC: 65 MG/DL
LEUKOCYTE ESTERASE URINE: ABNORMAL
LYMPHOCYTES # BLD AUTO: 1.06 K/UL
LYMPHOCYTES NFR BLD AUTO: 10.9 %
MAN DIFF?: NORMAL
MCHC RBC-ENTMCNC: 28.9 PG
MCHC RBC-ENTMCNC: 30.7 GM/DL
MCV RBC AUTO: 94.2 FL
MICROALBUMIN 24H UR DL<=1MG/L-MCNC: 9.6 MG/DL
MICROALBUMIN/CREAT 24H UR-RTO: 75 MG/G
MICROSCOPIC-UA: NORMAL
MONOCYTES # BLD AUTO: 0.96 K/UL
MONOCYTES NFR BLD AUTO: 9.9 %
NEUTROPHILS # BLD AUTO: 6.94 K/UL
NEUTROPHILS NFR BLD AUTO: 71.2 %
NITRITE URINE: NEGATIVE
NONHDLC SERPL-MCNC: 91 MG/DL
PH URINE: 6
PLATELET # BLD AUTO: 225 K/UL
POTASSIUM SERPL-SCNC: 4.9 MMOL/L
PROT SERPL-MCNC: 6.3 G/DL
PROTEIN URINE: ABNORMAL
RBC # BLD: 3.77 M/UL
RBC # FLD: 13.5 %
RED BLOOD CELLS URINE: 4 /HPF
SODIUM SERPL-SCNC: 139 MMOL/L
SPECIFIC GRAVITY URINE: 1.02
SQUAMOUS EPITHELIAL CELLS: NORMAL
T3 SERPL-MCNC: 76 NG/DL
T4 FREE SERPL-MCNC: 1.7 NG/DL
TIBC SERPL-MCNC: 342 UG/DL
TRIGL SERPL-MCNC: 148 MG/DL
TSH SERPL-ACNC: 3.1 UIU/ML
UIBC SERPL-MCNC: 316 UG/DL
UROBILINOGEN URINE: NORMAL
WBC # FLD AUTO: 9.74 K/UL
WHITE BLOOD CELLS URINE: ABNORMAL /HPF

## 2023-10-20 RX ORDER — CHLORHEXIDINE GLUCONATE 4 %
325 (65 FE) LIQUID (ML) TOPICAL TWICE DAILY
Qty: 180 | Refills: 3 | Status: ACTIVE | COMMUNITY
Start: 2023-10-20 | End: 1900-01-01

## 2023-11-15 ENCOUNTER — APPOINTMENT (OUTPATIENT)
Dept: UROLOGY | Facility: CLINIC | Age: 78
End: 2023-11-15
Payer: MEDICARE

## 2023-11-15 VITALS
TEMPERATURE: 97.1 F | DIASTOLIC BLOOD PRESSURE: 64 MMHG | HEIGHT: 64 IN | RESPIRATION RATE: 98 BRPM | HEART RATE: 54 BPM | OXYGEN SATURATION: 99 % | BODY MASS INDEX: 30.73 KG/M2 | SYSTOLIC BLOOD PRESSURE: 101 MMHG | WEIGHT: 180 LBS

## 2023-11-15 PROCEDURE — 99213 OFFICE O/P EST LOW 20 MIN: CPT

## 2023-11-15 PROCEDURE — 76775 US EXAM ABDO BACK WALL LIM: CPT

## 2023-11-28 ENCOUNTER — APPOINTMENT (OUTPATIENT)
Age: 78
End: 2023-11-28
Payer: MEDICARE

## 2023-11-28 VITALS
BODY MASS INDEX: 31.58 KG/M2 | HEIGHT: 64 IN | DIASTOLIC BLOOD PRESSURE: 65 MMHG | OXYGEN SATURATION: 97 % | SYSTOLIC BLOOD PRESSURE: 112 MMHG | WEIGHT: 185 LBS | HEART RATE: 75 BPM

## 2023-11-28 DIAGNOSIS — M54.50 LOW BACK PAIN, UNSPECIFIED: ICD-10-CM

## 2023-11-28 DIAGNOSIS — M17.11 UNILATERAL PRIMARY OSTEOARTHRITIS, RIGHT KNEE: ICD-10-CM

## 2023-11-28 DIAGNOSIS — G89.29 LOW BACK PAIN, UNSPECIFIED: ICD-10-CM

## 2023-11-28 PROCEDURE — 73564 X-RAY EXAM KNEE 4 OR MORE: CPT | Mod: LT

## 2023-11-28 PROCEDURE — 20610 DRAIN/INJ JOINT/BURSA W/O US: CPT | Mod: RT

## 2023-11-28 PROCEDURE — 99204 OFFICE O/P NEW MOD 45 MIN: CPT | Mod: 25

## 2023-11-28 PROCEDURE — 72080 X-RAY EXAM THORACOLMB 2/> VW: CPT

## 2023-12-15 ENCOUNTER — APPOINTMENT (OUTPATIENT)
Dept: UROLOGY | Facility: CLINIC | Age: 78
End: 2023-12-15
Payer: MEDICARE

## 2023-12-15 DIAGNOSIS — Q61.02 CONGENITAL MULTIPLE RENAL CYSTS: ICD-10-CM

## 2023-12-15 DIAGNOSIS — N20.0 CALCULUS OF KIDNEY: ICD-10-CM

## 2023-12-15 PROCEDURE — 99443: CPT

## 2023-12-15 RX ORDER — HYALURONATE SODIUM 20 MG/2 ML
20 SYRINGE (ML) INTRAARTICULAR ONCE
Qty: 1 | Refills: 0 | Status: ACTIVE | OUTPATIENT
Start: 2023-12-15

## 2023-12-15 NOTE — ASSESSMENT
[FreeTextEntry1] : Very pleasant 78-year-old gentleman who presents for follow-up of BPH, kidney stones -Follow-up in 1 year with repeat renal ultrasound for kidney stones -Continue tamsulosin-refill sent to pharmacy -Creatinine 1.75, stable -A1c 6.0% -WBC 9.74 -Follow-up in 6 months

## 2023-12-15 NOTE — HISTORY OF PRESENT ILLNESS
[FreeTextEntry1] : The patient-doctor relationship has been established via real time audio communication using telemedicine software.  He has requested care to be assessed and treated through telemedicine. He understands that there may be limitations in this process and that he may need further follow up care in the office and/or hospital setting.   Very pleasant 78-year-old gentleman who presents for follow-up of history of kidney stones, renal cysts, BPH.  At last visit he was started on tamsulosin for a weak urinary stream.  He reports that his urinary symptoms have significantly improved on the medication.  He denies flank pain.  No side effects from the medication.  He reports that he is urinating better.  He wishes to continue to take the medication.

## 2023-12-19 ENCOUNTER — TRANSCRIPTION ENCOUNTER (OUTPATIENT)
Age: 78
End: 2023-12-19

## 2023-12-21 ENCOUNTER — TRANSCRIPTION ENCOUNTER (OUTPATIENT)
Age: 78
End: 2023-12-21

## 2023-12-22 ENCOUNTER — TRANSCRIPTION ENCOUNTER (OUTPATIENT)
Age: 78
End: 2023-12-22

## 2024-01-03 ENCOUNTER — TRANSCRIPTION ENCOUNTER (OUTPATIENT)
Age: 79
End: 2024-01-03

## 2024-01-22 ENCOUNTER — APPOINTMENT (OUTPATIENT)
Age: 79
End: 2024-01-22

## 2024-01-23 ENCOUNTER — TRANSCRIPTION ENCOUNTER (OUTPATIENT)
Age: 79
End: 2024-01-23

## 2024-02-05 ENCOUNTER — TRANSCRIPTION ENCOUNTER (OUTPATIENT)
Age: 79
End: 2024-02-05

## 2024-02-08 ENCOUNTER — TRANSCRIPTION ENCOUNTER (OUTPATIENT)
Age: 79
End: 2024-02-08

## 2024-02-14 ENCOUNTER — TRANSCRIPTION ENCOUNTER (OUTPATIENT)
Age: 79
End: 2024-02-14

## 2024-02-21 ENCOUNTER — RX RENEWAL (OUTPATIENT)
Age: 79
End: 2024-02-21

## 2024-02-23 ENCOUNTER — APPOINTMENT (OUTPATIENT)
Dept: NEUROLOGY | Facility: CLINIC | Age: 79
End: 2024-02-23
Payer: MEDICARE

## 2024-02-23 ENCOUNTER — TRANSCRIPTION ENCOUNTER (OUTPATIENT)
Age: 79
End: 2024-02-23

## 2024-02-23 VITALS
BODY MASS INDEX: 30.73 KG/M2 | HEART RATE: 79 BPM | WEIGHT: 180 LBS | OXYGEN SATURATION: 95 % | HEIGHT: 64 IN | DIASTOLIC BLOOD PRESSURE: 71 MMHG | TEMPERATURE: 97.4 F | SYSTOLIC BLOOD PRESSURE: 116 MMHG

## 2024-02-23 DIAGNOSIS — F02.80 DEMENTIA IN OTHER DISEASES CLASSIFIED ELSEWHERE W/OUT BEHAVIORAL DISTURBANCE: ICD-10-CM

## 2024-02-23 PROCEDURE — 99215 OFFICE O/P EST HI 40 MIN: CPT

## 2024-02-23 NOTE — PHYSICAL EXAM
[Person] : oriented to person [Place] : oriented to place [Time] : oriented to time [Short Term Intact] : short term memory intact [Remote Intact] : remote memory intact [Span Intact] : the attention span was normal [Naming Objects] : no difficulty naming common objects [Writing A Sentence] : no difficulty writing a sentence [Comprehension] : comprehension intact [Reading] : reading intact [Past History] : adequate knowledge of personal past history [Cranial Nerves Oculomotor (III)] : extraocular motion intact [Cranial Nerves Trigeminal (V)] : facial sensation intact symmetrically [Cranial Nerves Vestibulocochlear (VIII)] : hearing was intact bilaterally [Cranial Nerves Facial (VII)] : face symmetrical [Cranial Nerves Glossopharyngeal (IX)] : tongue and palate midline [Cranial Nerves Accessory (XI - Cranial And Spinal)] : head turning and shoulder shrug symmetric [Cranial Nerves Hypoglossal (XII)] : there was no tongue deviation with protrusion [Bitemporal Hemianopsia] : bitemporal hemianopsia present [PERRLA] : pupils equal in size, round, reactive to light, with normal accommodation [Motor Tone] : muscle tone was normal in all four extremities [Motor Strength] : muscle strength was normal in all four extremities [Motor Handedness Right-Handed] : the patient is right hand dominant [Sensation Tactile Decrease] : light touch was intact [Sensation Vibration Decrease] : vibration was intact [Proprioception] : proprioception was intact [2+] : Brachioradialis left 2+ [1+] : Ankle jerk left 1+ [Plantar Reflex Left Only] : abnormal on the left [PERRL With Normal Accommodation] : pupils were equal in size, round, reactive to light, with normal accommodation [Extraocular Movements] : extraocular movements were intact [No APD] : no afferent pupillary defect [No YARON] : no internuclear ophthalmoplegia [Outer Ear] : the ears and nose were normal in appearance [Oropharynx] : the oropharynx was normal [Neck Appearance] : the appearance of the neck was normal [Neck Cervical Mass (___cm)] : no neck mass was observed [Jugular Venous Distention Increased] : there was no jugular-venous distention [Thyroid Diffuse Enlargement] : the thyroid was not enlarged [Thyroid Nodule] : there were no palpable thyroid nodules [Full Pulse] : the pedal pulses are present [Edema] : there was no peripheral edema [Bowel Sounds] : normal bowel sounds [Abdomen Soft] : soft [Abdomen Tenderness] : non-tender [Abdomen Mass (___ Cm)] : no abdominal mass palpated [No Spinal Tenderness] : no spinal tenderness [No CVA Tenderness] : no ~M costovertebral angle tenderness [Antalgic Gait Bilateral] : antalgic bilaterally [Visual Intact] : visual attention was ~T ~L decreased [Repeating Phrases] : difficulty repeating a phrase [Fluency] : fluency not intact [Vocabulary] : inadequate range of vocabulary [Paresis Pronator Drift Right-Sided] : no pronator drift on the right [Paresis Pronator Drift Left-Sided] : no pronator drift on the left [Motor Strength Upper Extremities Bilaterally] : strength was normal in both upper extremities [Motor Strength Lower Extremities Bilaterally] : strength was normal in both lower extremities [Dysdiadochokinesia Bilaterally] : not present [Coordination - Dysmetria Impaired Finger-to-Nose Bilateral] : not present [Coordination - Dysmetria Impaired Heel-to-Shin Bilateral] : not present [Plantar Reflex Right Only] : normal on the right [] : no respiratory distress [Auscultation Breath Sounds / Voice Sounds] : lungs were clear to auscultation bilaterally [Heart Rate And Rhythm] : heart rate was normal and rhythm regular [Heart Sounds] : normal S1 and S2 [Heart Sounds Gallop] : no gallops [Murmurs] : no murmurs [Heart Sounds Pericardial Friction Rub] : no pericardial rub [FreeTextEntry1] : Right knee swelling, left knee replacement

## 2024-02-23 NOTE — DISCUSSION/SUMMARY
[FreeTextEntry1] : MMSE dropped from 27 to 24 bilateral visual field defects; previous MRI showed microvascular ischemic changes.   May have surgery but increased risk of stroke and increased risk of worsening memory loss particularly with general anesthesia. Advised lose weight 40 lbs diet changes discussed;  weight loss will also slow progression of dementia

## 2024-02-23 NOTE — ASSESSMENT
[FreeTextEntry1] : Mild vascular dementia; surgery is appropriate and necessary but he must understand slightly increased risk of stroke due to age and cerebrovascular disease, and worsening of memory particularly with general anesthesia. Advised aspirin 81 mg daily in addition to atorvastatin 40 mg weight loss.

## 2024-02-23 NOTE — HISTORY OF PRESENT ILLNESS
[FreeTextEntry1] : June 2021 :Continues to have occasional headaches.  He continues to snore sometimes choke and is sleepy or drowsy during the afternoons.  Reports no complaints; denies headaches; conitnues to snore and choke; requests clearance for upcoming surgery on 4/08/2024

## 2024-02-26 ENCOUNTER — RESULT REVIEW (OUTPATIENT)
Age: 79
End: 2024-02-26

## 2024-02-28 ENCOUNTER — APPOINTMENT (OUTPATIENT)
Dept: UROLOGY | Facility: CLINIC | Age: 79
End: 2024-02-28
Payer: MEDICARE

## 2024-02-28 VITALS
BODY MASS INDEX: 30.73 KG/M2 | DIASTOLIC BLOOD PRESSURE: 60 MMHG | OXYGEN SATURATION: 98 % | SYSTOLIC BLOOD PRESSURE: 110 MMHG | WEIGHT: 180 LBS | HEIGHT: 64 IN | HEART RATE: 68 BPM

## 2024-02-28 DIAGNOSIS — N13.8 BENIGN PROSTATIC HYPERPLASIA WITH LOWER URINARY TRACT SYMPMS: ICD-10-CM

## 2024-02-28 DIAGNOSIS — N40.1 BENIGN PROSTATIC HYPERPLASIA WITH LOWER URINARY TRACT SYMPMS: ICD-10-CM

## 2024-02-28 PROCEDURE — 99213 OFFICE O/P EST LOW 20 MIN: CPT

## 2024-02-28 PROCEDURE — G2211 COMPLEX E/M VISIT ADD ON: CPT

## 2024-02-28 NOTE — PHYSICAL EXAM
[Normal Appearance] : normal appearance [General Appearance - In No Acute Distress] : no acute distress [Well Groomed] : well groomed [Edema] : no peripheral edema [Respiration, Rhythm And Depth] : normal respiratory rhythm and effort [Exaggerated Use Of Accessory Muscles For Inspiration] : no accessory muscle use [Abdomen Soft] : soft [Abdomen Tenderness] : non-tender [Costovertebral Angle Tenderness] : no ~M costovertebral angle tenderness [Urinary Bladder Findings] : the bladder was normal on palpation [Normal Station and Gait] : the gait and station were normal for the patient's age [] : no rash [No Focal Deficits] : no focal deficits [Mood] : the mood was normal [Oriented To Time, Place, And Person] : oriented to person, place, and time [Affect] : the affect was normal [No Palpable Adenopathy] : no palpable adenopathy

## 2024-02-28 NOTE — ASSESSMENT
[FreeTextEntry1] : Very pleasant 78-year-old gentleman who presents for follow-up of BPH with urinary obstruction, difficulty urinating -We discussed potential etiologies of difficulty urinating, including worsening BPH -Continue tamsulosin -We discussed the importance of continuing tamsulosin around the perioperative period for his knee replacement -We discussed the option to start another medication such as a 5 alpha reductase inhibitor.  We also discussed surgical options for BPH.  At this time given that he feels well he wishes to continue on tamsulosin monotherapy -Follow-up in 6 months  Patient is being seen today for evaluation and management of a chronic and longitudinal ongoing condition and I am of the primary treating physician

## 2024-02-28 NOTE — HISTORY OF PRESENT ILLNESS
[FreeTextEntry1] : Very pleasant 78-year-old gentleman who presents for follow-up of BPH with urinary obstruction with new complaint of difficulty urinating.  Approximately 2 weeks ago he began to experience the onset of difficulty urinating.  He reports that this lasted for approximately 2 days and he subsequently began to urinate normally again.  He now reports that he is urinating well.  He continues to take tamsulosin.  He is scheduled to undergo knee replacement in April 2024.

## 2024-02-29 ENCOUNTER — TRANSCRIPTION ENCOUNTER (OUTPATIENT)
Age: 79
End: 2024-02-29

## 2024-03-04 ENCOUNTER — APPOINTMENT (OUTPATIENT)
Dept: MRI IMAGING | Facility: CLINIC | Age: 79
End: 2024-03-04
Payer: MEDICARE

## 2024-03-04 ENCOUNTER — APPOINTMENT (OUTPATIENT)
Age: 79
End: 2024-03-04

## 2024-03-04 ENCOUNTER — TRANSCRIPTION ENCOUNTER (OUTPATIENT)
Age: 79
End: 2024-03-04

## 2024-03-04 PROCEDURE — 70551 MRI BRAIN STEM W/O DYE: CPT

## 2024-03-04 PROCEDURE — 76377 3D RENDER W/INTRP POSTPROCES: CPT

## 2024-03-13 LAB
AMPA-R ABCBA: NEGATIVE
AMPHIPHYSIN IGG TITR SER IF: NEGATIVE
ANNOTATION COMMENT IMP: NORMAL
CASPR2-IGG CBA, S: NEGATIVE
CV2 IGG TITR SER: NEGATIVE
FOLATE SERPL-MCNC: 5.7 NG/ML
GABA-B ABCBA: NEGATIVE
GAD65 AB SER-MCNC: 0.04 NMOL/L
GLIAL NUC TYPE 1 AB TITR SER: NEGATIVE
HU1 AB TITR SER: NEGATIVE
HU2 AB TITR SER IF: NEGATIVE
HU3 AB TITR SER: NEGATIVE
IGLON5 IFA, S: NEGATIVE
IMMUNOLOGIST REVIEW: NORMAL
LGI1-IGG CBA, S: NEGATIVE
NIF IFA, S: NEGATIVE
NMDA-R ABCBA: NEGATIVE
PCA-1 AB TITR SER: NEGATIVE
PCA-2 AB TITR SER: NEGATIVE
PCA-TR AB TITR SER: NEGATIVE
VIT B12 SERPL-MCNC: <150 PG/ML

## 2024-03-14 ENCOUNTER — NON-APPOINTMENT (OUTPATIENT)
Age: 79
End: 2024-03-14

## 2024-03-14 ENCOUNTER — APPOINTMENT (OUTPATIENT)
Dept: CARDIOLOGY | Facility: CLINIC | Age: 79
End: 2024-03-14
Payer: MEDICARE

## 2024-03-14 VITALS — HEART RATE: 67 BPM | DIASTOLIC BLOOD PRESSURE: 73 MMHG | OXYGEN SATURATION: 98 % | SYSTOLIC BLOOD PRESSURE: 124 MMHG

## 2024-03-14 VITALS — HEIGHT: 64 IN | BODY MASS INDEX: 31.92 KG/M2 | WEIGHT: 187 LBS

## 2024-03-14 DIAGNOSIS — R79.89 OTHER SPECIFIED ABNORMAL FINDINGS OF BLOOD CHEMISTRY: ICD-10-CM

## 2024-03-14 DIAGNOSIS — Z01.810 ENCOUNTER FOR PREPROCEDURAL CARDIOVASCULAR EXAMINATION: ICD-10-CM

## 2024-03-14 DIAGNOSIS — I10 ESSENTIAL (PRIMARY) HYPERTENSION: ICD-10-CM

## 2024-03-14 DIAGNOSIS — I25.10 ATHEROSCLEROTIC HEART DISEASE OF NATIVE CORONARY ARTERY W/OUT ANGINA PECTORIS: ICD-10-CM

## 2024-03-14 DIAGNOSIS — E78.5 HYPERLIPIDEMIA, UNSPECIFIED: ICD-10-CM

## 2024-03-14 PROCEDURE — 93000 ELECTROCARDIOGRAM COMPLETE: CPT

## 2024-03-14 PROCEDURE — 99204 OFFICE O/P NEW MOD 45 MIN: CPT

## 2024-03-14 PROCEDURE — G2211 COMPLEX E/M VISIT ADD ON: CPT

## 2024-03-14 RX ORDER — CYANOCOBALAMIN 1000 UG/ML
1000 INJECTION INTRAMUSCULAR; SUBCUTANEOUS
Qty: 1 | Refills: 0 | Status: ACTIVE | COMMUNITY
Start: 2024-03-14 | End: 1900-01-01

## 2024-03-14 RX ORDER — CYANOCOBALAMIN 1000 UG/ML
1000 INJECTION INTRAMUSCULAR; SUBCUTANEOUS
Qty: 10 | Refills: 0 | Status: ACTIVE | COMMUNITY
Start: 2024-03-14 | End: 1900-01-01

## 2024-03-14 RX ORDER — FOLIC ACID 1 MG/1
1 TABLET ORAL
Qty: 15 | Refills: 3 | Status: ACTIVE | COMMUNITY
Start: 2024-03-14 | End: 1900-01-01

## 2024-03-18 ENCOUNTER — TRANSCRIPTION ENCOUNTER (OUTPATIENT)
Age: 79
End: 2024-03-18

## 2024-04-10 NOTE — ED ADULT TRIAGE NOTE - BP NONINVASIVE SYSTOLIC (MM HG)
Patient's daughter in law returned call. Conveyed INR result of 1.6 and updated dosing of 5mg nightly. Per Ugo, they have 5mg tablets on hand and will adjust dose to 1, 5mg tablet beginning today. Recheck in 1 week. She has no additional questions or concerns at this time.    79

## 2024-04-11 NOTE — ED PROVIDER NOTE - CARE PLAN
I spoke with patient, pcp does not have openings prior to surgery  Sondra Villar RN on 4/11/2024 at 11:54 AM    Principal Discharge DX:	Acute pyelonephritis   1

## 2024-04-22 ENCOUNTER — RX RENEWAL (OUTPATIENT)
Age: 79
End: 2024-04-22

## 2024-04-22 RX ORDER — TAMSULOSIN HYDROCHLORIDE 0.4 MG/1
0.4 CAPSULE ORAL
Qty: 90 | Refills: 1 | Status: ACTIVE | COMMUNITY
Start: 2023-11-15 | End: 1900-01-01

## 2024-05-03 ENCOUNTER — TRANSCRIPTION ENCOUNTER (OUTPATIENT)
Age: 79
End: 2024-05-03

## 2024-06-18 ENCOUNTER — APPOINTMENT (OUTPATIENT)
Dept: UROLOGY | Facility: CLINIC | Age: 79
End: 2024-06-18

## 2024-07-16 ENCOUNTER — APPOINTMENT (OUTPATIENT)
Dept: INTERNAL MEDICINE | Facility: CLINIC | Age: 79
End: 2024-07-16

## 2024-08-26 ENCOUNTER — APPOINTMENT (OUTPATIENT)
Dept: NEUROLOGY | Facility: CLINIC | Age: 79
End: 2024-08-26

## 2024-09-25 NOTE — DISCHARGE NOTE ADULT - MEDICATION SUMMARY - MEDICATIONS TO TAKE
I will START or STAY ON the medications listed below when I get home from the hospital:    acetaminophen-codeine 300 mg-30 mg oral tablet  -- 1 tab(s) by mouth every 4 hours, As needed, Moderate Pain  -- Indication: For Pain    Aspirin Enteric Coated 81 mg oral delayed release tablet  -- 1 tab(s) by mouth once a day  -- Indication: For CAD (coronary artery disease)    atorvastatin 20 mg oral tablet  -- 1 tab(s) by mouth once a day (at bedtime)  -- Indication: For CAD (coronary artery disease)    metoprolol tartrate 25 mg oral tablet  --  by mouth once a day  -- Indication: For Hypertension    amLODIPine 10 mg oral tablet  -- 1 tab(s) by mouth once a day  -- Indication: For Hypertension    omeprazole 20 mg oral delayed release capsule  -- 1 cap(s) by mouth once a day  -- Indication: For Need for prophylactic measure    levothyroxine 112 mcg (0.112 mg) oral tablet  -- 1 tab(s) by mouth once a day  -- Indication: For Hypothyroid None

## 2024-12-10 ENCOUNTER — APPOINTMENT (OUTPATIENT)
Dept: UROLOGY | Facility: CLINIC | Age: 79
End: 2024-12-10

## 2025-01-27 ENCOUNTER — NON-APPOINTMENT (OUTPATIENT)
Age: 80
End: 2025-01-27

## 2025-01-28 ENCOUNTER — APPOINTMENT (OUTPATIENT)
Dept: UROLOGY | Facility: CLINIC | Age: 80
End: 2025-01-28

## 2025-03-03 ENCOUNTER — NON-APPOINTMENT (OUTPATIENT)
Age: 80
End: 2025-03-03

## 2025-03-03 ENCOUNTER — APPOINTMENT (OUTPATIENT)
Dept: INTERNAL MEDICINE | Facility: CLINIC | Age: 80
End: 2025-03-03
Payer: MEDICARE

## 2025-03-03 VITALS
BODY MASS INDEX: 29.02 KG/M2 | HEART RATE: 76 BPM | DIASTOLIC BLOOD PRESSURE: 75 MMHG | RESPIRATION RATE: 15 BRPM | TEMPERATURE: 97.2 F | WEIGHT: 170 LBS | SYSTOLIC BLOOD PRESSURE: 136 MMHG | HEIGHT: 64 IN | OXYGEN SATURATION: 100 %

## 2025-03-03 VITALS
OXYGEN SATURATION: 100 % | TEMPERATURE: 97.2 F | RESPIRATION RATE: 15 BRPM | HEIGHT: 64 IN | WEIGHT: 170 LBS | SYSTOLIC BLOOD PRESSURE: 136 MMHG | HEART RATE: 76 BPM | BODY MASS INDEX: 29.02 KG/M2 | DIASTOLIC BLOOD PRESSURE: 75 MMHG

## 2025-03-03 DIAGNOSIS — I10 ESSENTIAL (PRIMARY) HYPERTENSION: ICD-10-CM

## 2025-03-03 DIAGNOSIS — R73.09 OTHER ABNORMAL GLUCOSE: ICD-10-CM

## 2025-03-03 DIAGNOSIS — D50.9 IRON DEFICIENCY ANEMIA, UNSPECIFIED: ICD-10-CM

## 2025-03-03 DIAGNOSIS — E03.9 HYPOTHYROIDISM, UNSPECIFIED: ICD-10-CM

## 2025-03-03 DIAGNOSIS — I25.10 ATHEROSCLEROTIC HEART DISEASE OF NATIVE CORONARY ARTERY W/OUT ANGINA PECTORIS: ICD-10-CM

## 2025-03-03 DIAGNOSIS — N18.30 CHRONIC KIDNEY DISEASE, STAGE 3 UNSPECIFIED: ICD-10-CM

## 2025-03-03 DIAGNOSIS — E78.2 MIXED HYPERLIPIDEMIA: ICD-10-CM

## 2025-03-03 PROCEDURE — 99214 OFFICE O/P EST MOD 30 MIN: CPT | Mod: 25

## 2025-03-03 PROCEDURE — 93000 ELECTROCARDIOGRAM COMPLETE: CPT | Mod: 59

## 2025-03-04 ENCOUNTER — APPOINTMENT (OUTPATIENT)
Dept: UROLOGY | Facility: CLINIC | Age: 80
End: 2025-03-04
Payer: MEDICARE

## 2025-03-04 VITALS
OXYGEN SATURATION: 98 % | SYSTOLIC BLOOD PRESSURE: 117 MMHG | TEMPERATURE: 97 F | DIASTOLIC BLOOD PRESSURE: 66 MMHG | HEART RATE: 69 BPM

## 2025-03-04 DIAGNOSIS — N20.0 CALCULUS OF KIDNEY: ICD-10-CM

## 2025-03-04 DIAGNOSIS — N40.1 BENIGN PROSTATIC HYPERPLASIA WITH LOWER URINARY TRACT SYMPMS: ICD-10-CM

## 2025-03-04 DIAGNOSIS — Q61.02 CONGENITAL MULTIPLE RENAL CYSTS: ICD-10-CM

## 2025-03-04 DIAGNOSIS — N13.8 BENIGN PROSTATIC HYPERPLASIA WITH LOWER URINARY TRACT SYMPMS: ICD-10-CM

## 2025-03-04 PROCEDURE — 99214 OFFICE O/P EST MOD 30 MIN: CPT | Mod: 25

## 2025-03-04 PROCEDURE — 76775 US EXAM ABDO BACK WALL LIM: CPT

## 2025-03-06 LAB
25(OH)D3 SERPL-MCNC: 26 NG/ML
ALBUMIN SERPL ELPH-MCNC: 4.1 G/DL
ALP BLD-CCNC: 104 U/L
ALT SERPL-CCNC: 9 U/L
ANION GAP SERPL CALC-SCNC: 19 MMOL/L
APPEARANCE: ABNORMAL
AST SERPL-CCNC: 15 U/L
BACTERIA: ABNORMAL /HPF
BASOPHILS # BLD AUTO: 0.1 K/UL
BASOPHILS NFR BLD AUTO: 1.2 %
BILIRUB SERPL-MCNC: 0.5 MG/DL
BILIRUBIN URINE: NEGATIVE
BLOOD URINE: ABNORMAL
BUN SERPL-MCNC: 17 MG/DL
CALCIUM SERPL-MCNC: 10 MG/DL
CAST: 4 /LPF
CHLORIDE SERPL-SCNC: 107 MMOL/L
CHOLEST SERPL-MCNC: 148 MG/DL
CO2 SERPL-SCNC: 19 MMOL/L
COLOR: YELLOW
CREAT SERPL-MCNC: 1.72 MG/DL
EGFRCR SERPLBLD CKD-EPI 2021: 39 ML/MIN/1.73M2
EOSINOPHIL # BLD AUTO: 0.39 K/UL
EOSINOPHIL NFR BLD AUTO: 4.5 %
EPITHELIAL CELLS: 0 /HPF
ESTIMATED AVERAGE GLUCOSE: 123 MG/DL
GGT SERPL-CCNC: 20 U/L
GLUCOSE QUALITATIVE U: NEGATIVE MG/DL
GLUCOSE SERPL-MCNC: 132 MG/DL
HBA1C MFR BLD HPLC: 5.9 %
HCT VFR BLD CALC: 36.8 %
HCV AB SER QL: NONREACTIVE
HCV S/CO RATIO: 0.07 S/CO
HDLC SERPL-MCNC: 57 MG/DL
HGB BLD-MCNC: 10.7 G/DL
IMM GRANULOCYTES NFR BLD AUTO: 0.5 %
IRON SATN MFR SERPL: 11 %
IRON SERPL-MCNC: 42 UG/DL
KETONES URINE: NEGATIVE MG/DL
LDLC SERPL CALC-MCNC: 67 MG/DL
LEUKOCYTE ESTERASE URINE: ABNORMAL
LYMPHOCYTES # BLD AUTO: 1.57 K/UL
LYMPHOCYTES NFR BLD AUTO: 18.2 %
MAN DIFF?: NORMAL
MCHC RBC-ENTMCNC: 27.3 PG
MCHC RBC-ENTMCNC: 29.1 G/DL
MCV RBC AUTO: 93.9 FL
MICROSCOPIC-UA: NORMAL
MONOCYTES # BLD AUTO: 0.87 K/UL
MONOCYTES NFR BLD AUTO: 10.1 %
NEUTROPHILS # BLD AUTO: 5.66 K/UL
NEUTROPHILS NFR BLD AUTO: 65.5 %
NITRITE URINE: POSITIVE
NONHDLC SERPL-MCNC: 91 MG/DL
PH URINE: 6.5
PLATELET # BLD AUTO: 237 K/UL
POTASSIUM SERPL-SCNC: 4.6 MMOL/L
PROT SERPL-MCNC: 6.9 G/DL
PROTEIN URINE: NEGATIVE MG/DL
RBC # BLD: 3.92 M/UL
RBC # FLD: 14.1 %
RED BLOOD CELLS URINE: 0 /HPF
REVIEW: NORMAL
SODIUM SERPL-SCNC: 144 MMOL/L
SPECIFIC GRAVITY URINE: 1.01
T4 FREE SERPL-MCNC: 2 NG/DL
TIBC SERPL-MCNC: 368 UG/DL
TRIGL SERPL-MCNC: 138 MG/DL
TSH SERPL-ACNC: 1.14 UIU/ML
UIBC SERPL-MCNC: 326 UG/DL
UROBILINOGEN URINE: 0.2 MG/DL
VIT B12 SERPL-MCNC: 178 PG/ML
WBC # FLD AUTO: 8.63 K/UL
WHITE BLOOD CELLS URINE: 245 /HPF

## 2025-03-10 ENCOUNTER — APPOINTMENT (OUTPATIENT)
Dept: DERMATOLOGY | Facility: CLINIC | Age: 80
End: 2025-03-10

## 2025-03-11 ENCOUNTER — TRANSCRIPTION ENCOUNTER (OUTPATIENT)
Age: 80
End: 2025-03-11

## 2025-03-19 ENCOUNTER — TRANSCRIPTION ENCOUNTER (OUTPATIENT)
Age: 80
End: 2025-03-19

## 2025-03-20 ENCOUNTER — APPOINTMENT (OUTPATIENT)
Dept: INTERNAL MEDICINE | Facility: CLINIC | Age: 80
End: 2025-03-20
Payer: MEDICARE

## 2025-03-20 DIAGNOSIS — R79.89 OTHER SPECIFIED ABNORMAL FINDINGS OF BLOOD CHEMISTRY: ICD-10-CM

## 2025-03-20 PROCEDURE — 96372 THER/PROPH/DIAG INJ SC/IM: CPT

## 2025-03-20 RX ORDER — CYANOCOBALAMIN 1000 UG/ML
1000 INJECTION, SOLUTION INTRAMUSCULAR; SUBCUTANEOUS
Qty: 0 | Refills: 0 | Status: COMPLETED | OUTPATIENT
Start: 2025-03-20

## 2025-03-20 RX ADMIN — CYANOCOBALAMIN 0 MCG/ML: 1000 INJECTION, SOLUTION INTRAMUSCULAR; SUBCUTANEOUS at 00:00

## 2025-04-15 ENCOUNTER — EMERGENCY (EMERGENCY)
Facility: HOSPITAL | Age: 80
LOS: 1 days | End: 2025-04-15
Attending: STUDENT IN AN ORGANIZED HEALTH CARE EDUCATION/TRAINING PROGRAM
Payer: MEDICARE

## 2025-04-15 VITALS
HEART RATE: 73 BPM | TEMPERATURE: 98 F | DIASTOLIC BLOOD PRESSURE: 73 MMHG | WEIGHT: 169.76 LBS | RESPIRATION RATE: 17 BRPM | SYSTOLIC BLOOD PRESSURE: 157 MMHG | OXYGEN SATURATION: 98 %

## 2025-04-15 DIAGNOSIS — H26.40 UNSPECIFIED SECONDARY CATARACT: Chronic | ICD-10-CM

## 2025-04-15 DIAGNOSIS — Z90.49 ACQUIRED ABSENCE OF OTHER SPECIFIED PARTS OF DIGESTIVE TRACT: Chronic | ICD-10-CM

## 2025-04-15 DIAGNOSIS — Z87.442 PERSONAL HISTORY OF URINARY CALCULI: Chronic | ICD-10-CM

## 2025-04-15 DIAGNOSIS — Z95.5 PRESENCE OF CORONARY ANGIOPLASTY IMPLANT AND GRAFT: Chronic | ICD-10-CM

## 2025-04-15 DIAGNOSIS — Z96.652 PRESENCE OF LEFT ARTIFICIAL KNEE JOINT: Chronic | ICD-10-CM

## 2025-04-15 DIAGNOSIS — Z98.89 OTHER SPECIFIED POSTPROCEDURAL STATES: Chronic | ICD-10-CM

## 2025-04-15 LAB
BASOPHILS # BLD AUTO: 0.13 K/UL — SIGNIFICANT CHANGE UP (ref 0–0.2)
BASOPHILS NFR BLD AUTO: 1.4 % — SIGNIFICANT CHANGE UP (ref 0–2)
EOSINOPHIL # BLD AUTO: 0.21 K/UL — SIGNIFICANT CHANGE UP (ref 0–0.5)
EOSINOPHIL NFR BLD AUTO: 2.3 % — SIGNIFICANT CHANGE UP (ref 0–6)
HCT VFR BLD CALC: 34.2 % — LOW (ref 39–50)
HGB BLD-MCNC: 11.1 G/DL — LOW (ref 13–17)
IMM GRANULOCYTES NFR BLD AUTO: 0.6 % — SIGNIFICANT CHANGE UP (ref 0–0.9)
LYMPHOCYTES # BLD AUTO: 1.21 K/UL — SIGNIFICANT CHANGE UP (ref 1–3.3)
LYMPHOCYTES # BLD AUTO: 13.3 % — SIGNIFICANT CHANGE UP (ref 13–44)
MCHC RBC-ENTMCNC: 28.5 PG — SIGNIFICANT CHANGE UP (ref 27–34)
MCHC RBC-ENTMCNC: 32.5 G/DL — SIGNIFICANT CHANGE UP (ref 32–36)
MCV RBC AUTO: 87.9 FL — SIGNIFICANT CHANGE UP (ref 80–100)
MONOCYTES # BLD AUTO: 0.92 K/UL — HIGH (ref 0–0.9)
MONOCYTES NFR BLD AUTO: 10.1 % — SIGNIFICANT CHANGE UP (ref 2–14)
NEUTROPHILS # BLD AUTO: 6.56 K/UL — SIGNIFICANT CHANGE UP (ref 1.8–7.4)
NEUTROPHILS NFR BLD AUTO: 72.3 % — SIGNIFICANT CHANGE UP (ref 43–77)
NRBC BLD AUTO-RTO: 0 /100 WBCS — SIGNIFICANT CHANGE UP (ref 0–0)
PLATELET # BLD AUTO: 186 K/UL — SIGNIFICANT CHANGE UP (ref 150–400)
RBC # BLD: 3.89 M/UL — LOW (ref 4.2–5.8)
RBC # FLD: 16 % — HIGH (ref 10.3–14.5)
WBC # BLD: 9.08 K/UL — SIGNIFICANT CHANGE UP (ref 3.8–10.5)
WBC # FLD AUTO: 9.08 K/UL — SIGNIFICANT CHANGE UP (ref 3.8–10.5)

## 2025-04-15 PROCEDURE — 99284 EMERGENCY DEPT VISIT MOD MDM: CPT

## 2025-04-15 RX ORDER — ACETAMINOPHEN 500 MG/5ML
1000 LIQUID (ML) ORAL ONCE
Refills: 0 | Status: COMPLETED | OUTPATIENT
Start: 2025-04-15 | End: 2025-04-15

## 2025-04-15 RX ADMIN — Medication 400 MILLIGRAM(S): at 23:32

## 2025-04-15 RX ADMIN — Medication 1000 MILLILITER(S): at 23:32

## 2025-04-15 NOTE — ED ADULT NURSE NOTE - TEMPLATE
Patient notified of MD instructions. They stated Dr. Chu had different instructions regarding the labs. I advised patient to follow up with Dr. Washington office then regarding the instructions from Dr. Chu's office.    General

## 2025-04-15 NOTE — ED ADULT NURSE NOTE - CCCP TRG CHIEF CMPLNT
[FreeTextEntry1] : T2DM slightly worse control, patient is not candidate for tight control due to advanced age and co-morbidities  pt ahs not been watching diet Continue current Metformin dose   Osteopenia  now off prolai  no other med given   elevated calcium - repeat calcium PTH before next visit  Could be primary HPH but Prolia was masking hypercalcemia ? has 2 remaining kidney stones   pt has been trying to hydrate as well  DEXA done in 11/22 showed improvement to osteopenia    UTI- follow up with urology tomorrow   HTN BP acceptable   high cholesterol - on atorvastatin   Repeat labs before next visit  RTO in 3 months with Dr. Foy    shivering, headache, drowsiness, stomachache/see chief complaint quote

## 2025-04-15 NOTE — ED ADULT NURSE NOTE - OBJECTIVE STATEMENT
Patient presented to the ED complaining of dizziness, shivering, and generalized body aches for 3 days. Patient states the symptoms comes and goes.

## 2025-04-15 NOTE — ED ADULT NURSE NOTE - NSFALLRISKINTERV_ED_ALL_ED
Assistance OOB with selected safe patient handling equipment if applicable/Assistance with ambulation/Communicate fall risk and risk factors to all staff, patient, and family/Encourage patient to sit up slowly, dangle for a short time, stand at bedside before walking/Monitor gait and stability/Orthostatic vital signs/Provide patient with walking aids/Provide visual cue: yellow wristband, yellow gown, etc/Reinforce activity limits and safety measures with patient and family/Call bell, personal items and telephone in reach/Instruct patient to call for assistance before getting out of bed/chair/stretcher/Non-slip footwear applied when patient is off stretcher/[place] to call system/Physically safe environment - no spills, clutter or unnecessary equipment/Purposeful Proactive Rounding/Room/bathroom lighting operational, light cord in reach

## 2025-04-16 VITALS
SYSTOLIC BLOOD PRESSURE: 125 MMHG | TEMPERATURE: 98 F | OXYGEN SATURATION: 98 % | HEART RATE: 67 BPM | DIASTOLIC BLOOD PRESSURE: 82 MMHG | RESPIRATION RATE: 18 BRPM

## 2025-04-16 LAB
ALBUMIN SERPL ELPH-MCNC: 3.3 G/DL — LOW (ref 3.5–5)
ALP SERPL-CCNC: 101 U/L — SIGNIFICANT CHANGE UP (ref 40–120)
ALT FLD-CCNC: 17 U/L DA — SIGNIFICANT CHANGE UP (ref 10–60)
ANION GAP SERPL CALC-SCNC: 6 MMOL/L — SIGNIFICANT CHANGE UP (ref 5–17)
AST SERPL-CCNC: 13 U/L — SIGNIFICANT CHANGE UP (ref 10–40)
BILIRUB SERPL-MCNC: 0.6 MG/DL — SIGNIFICANT CHANGE UP (ref 0.2–1.2)
BUN SERPL-MCNC: 21 MG/DL — HIGH (ref 7–18)
CALCIUM SERPL-MCNC: 9.5 MG/DL — SIGNIFICANT CHANGE UP (ref 8.4–10.5)
CHLORIDE SERPL-SCNC: 109 MMOL/L — HIGH (ref 96–108)
CO2 SERPL-SCNC: 22 MMOL/L — SIGNIFICANT CHANGE UP (ref 22–31)
CREAT SERPL-MCNC: 1.76 MG/DL — HIGH (ref 0.5–1.3)
EGFR: 38 ML/MIN/1.73M2 — LOW
EGFR: 38 ML/MIN/1.73M2 — LOW
FLUAV AG NPH QL: SIGNIFICANT CHANGE UP
FLUBV AG NPH QL: SIGNIFICANT CHANGE UP
GLUCOSE SERPL-MCNC: 115 MG/DL — HIGH (ref 70–99)
POTASSIUM SERPL-MCNC: 4.6 MMOL/L — SIGNIFICANT CHANGE UP (ref 3.5–5.3)
POTASSIUM SERPL-SCNC: 4.6 MMOL/L — SIGNIFICANT CHANGE UP (ref 3.5–5.3)
PROT SERPL-MCNC: 7.2 G/DL — SIGNIFICANT CHANGE UP (ref 6–8.3)
RSV RNA NPH QL NAA+NON-PROBE: SIGNIFICANT CHANGE UP
SARS-COV-2 RNA SPEC QL NAA+PROBE: SIGNIFICANT CHANGE UP
SODIUM SERPL-SCNC: 137 MMOL/L — SIGNIFICANT CHANGE UP (ref 135–145)
SOURCE RESPIRATORY: SIGNIFICANT CHANGE UP

## 2025-04-16 PROCEDURE — 99284 EMERGENCY DEPT VISIT MOD MDM: CPT | Mod: 25

## 2025-04-16 PROCEDURE — 85025 COMPLETE CBC W/AUTO DIFF WBC: CPT

## 2025-04-16 PROCEDURE — 71046 X-RAY EXAM CHEST 2 VIEWS: CPT | Mod: 26

## 2025-04-16 PROCEDURE — 80053 COMPREHEN METABOLIC PANEL: CPT

## 2025-04-16 PROCEDURE — 36415 COLL VENOUS BLD VENIPUNCTURE: CPT

## 2025-04-16 PROCEDURE — 96374 THER/PROPH/DIAG INJ IV PUSH: CPT

## 2025-04-16 PROCEDURE — 71046 X-RAY EXAM CHEST 2 VIEWS: CPT

## 2025-04-16 PROCEDURE — 87637 SARSCOV2&INF A&B&RSV AMP PRB: CPT

## 2025-04-16 RX ADMIN — Medication 1000 MILLIGRAM(S): at 00:19

## 2025-04-16 NOTE — ED PROVIDER NOTE - PHYSICAL EXAMINATION
Gen: NAD; well appearing  Resp: CTAB, no W/R/R  CV: RRR, +S1/S2, no M/R/G  GI: Abdomen soft non-distended, NTTP, no masses  MSK: No open wounds, no bruising, no lower extremity edema  Neuro: A&Ox4, sensation nl, motor 5/5 RUE/LUE/RLE/LLE, follows commands  Ext: no edema, no deformity, warm and well-perfused  Skin: no rash or bruising

## 2025-04-16 NOTE — ED PROVIDER NOTE - CLINICAL SUMMARY MEDICAL DECISION MAKING FREE TEXT BOX
79-year-old male, history of hypertension, prediabetes, presenting with 3 days of intermittent dizziness, chills, headache and abdominal pain.  Currently, patient is denying any symptoms or discomfort.  He denies any dizziness, headache, abdominal pain, chills.  Does endorse some cough.  No dysuria, hematuria, chest pain, nausea, vomiting, fever or chills.   VSS, afebrile.  Clear to auscultate bilaterally.  Abdomen soft, nontender nondistended.  No nystagmus on exam, no dizziness, neuro intact.  Strength 5/5 in bilateral upper and lower extremities, normal cerebellar finger-nose testing.  Will obtain screening labs, and chest x-ray.  Suspect likely viral etiology.  No need for imaging at this time given no headache, and no abd tenderness on exam.

## 2025-04-16 NOTE — ED PROVIDER NOTE - PATIENT PORTAL LINK FT
You can access the FollowMyHealth Patient Portal offered by [facility] by registering at the following website: [email or web address]. By joining [facility]’s FollowMyHealth portal, you will also be able to view your health information using other applications (apps) compatible with our system.

## 2025-04-16 NOTE — ED PROVIDER NOTE - OBJECTIVE STATEMENT
79-year-old male, history of hypertension, prediabetes, presenting with 3 days of intermittent dizziness, chills, headache and abdominal pain.  Currently, patient is denying any symptoms or discomfort.  He denies any dizziness, headache, abdominal pain, chills.  Does endorse some cough.  No dysuria, hematuria, chest pain, nausea, vomiting, fever or chills.

## 2025-04-16 NOTE — ED PROVIDER NOTE - NSFOLLOWUPINSTRUCTIONS_ED_ALL_ED_FT
– Return for any worsening or concerning symptoms (see below).  – Follow up with primary care doctor in the next 5 to 7 days.     Acute Cough    WHAT YOU NEED TO KNOW:    An acute cough can last up to 3 weeks. Common causes of an acute cough include a cold, allergies, or a lung infection.    DISCHARGE INSTRUCTIONS:    Return to the emergency department if:   •You have trouble breathing or feel short of breath.      •You cough up blood, or you see blood in your mucus.      •You faint or feel weak or dizzy.      •You have chest pain when you cough or take a deep breath.      •You have new wheezing.      Contact your healthcare provider if:   •You have a fever.      •Your cough lasts longer than 4 weeks.      •Your symptoms do not improve with treatment.      •You have questions or concerns about your condition or care.      Medicines:   •Medicines may be needed to stop the cough, decrease swelling in your airways, or help open your airways. Medicine may also be given to help you cough up mucus. Ask your healthcare provider what over-the-counter medicines you can take. If you have an infection caused by bacteria, you may need antibiotics.      •Take your medicine as directed. Contact your healthcare provider if you think your medicine is not helping or if you have side effects. Tell him or her if you are allergic to any medicine. Keep a list of the medicines, vitamins, and herbs you take. Include the amounts, and when and why you take them. Bring the list or the pill bottles to follow-up visits. Carry your medicine list with you in case of an emergency.      Manage your symptoms:   •Do not smoke and stay away from others who smoke. Nicotine and other chemicals in cigarettes and cigars can cause lung damage and make your cough worse. Ask your healthcare provider for information if you currently smoke and need help to quit. E-cigarettes or smokeless tobacco still contain nicotine. Talk to your healthcare provider before you use these products.      •Drink extra liquids as directed. Liquids will help thin and loosen mucus so you can cough it up. Liquids will also help prevent dehydration. Examples of good liquids to drink include water, fruit juice, and broth. Do not drink liquids that contain caffeine. Caffeine can increase your risk for dehydration. Ask your healthcare provider how much liquid to drink each day.      •Rest as directed. Do not do activities that make your cough worse, such as exercise.      •Use a humidifier or vaporizer. Use a cool mist humidifier or a vaporizer to increase air moisture in your home. This may make it easier for you to breathe and help decrease your cough.      •Eat 2 to 5 mL of honey 2 times each day. Honey can help thin mucus and decrease your cough.      •Use cough drops or lozenges. These can help decrease throat irritation and your cough.      Follow up with your healthcare provider as directed: Write down your questions so you remember to ask them during your visits.

## 2025-04-16 NOTE — ED PROVIDER NOTE - CARE PLAN
Principal Discharge DX:	Cough  Secondary Diagnosis:	Dizziness  Secondary Diagnosis:	Chills  Secondary Diagnosis:	H/O headache   1

## 2025-04-21 ENCOUNTER — APPOINTMENT (OUTPATIENT)
Dept: INTERNAL MEDICINE | Facility: CLINIC | Age: 80
End: 2025-04-21

## 2025-06-08 ENCOUNTER — NON-APPOINTMENT (OUTPATIENT)
Age: 80
End: 2025-06-08

## 2025-08-19 ENCOUNTER — NON-APPOINTMENT (OUTPATIENT)
Age: 80
End: 2025-08-19

## 2025-08-20 ENCOUNTER — APPOINTMENT (OUTPATIENT)
Dept: COLORECTAL SURGERY | Facility: CLINIC | Age: 80
End: 2025-08-20

## 2025-09-08 ENCOUNTER — APPOINTMENT (OUTPATIENT)
Dept: INTERNAL MEDICINE | Facility: CLINIC | Age: 80
End: 2025-09-08

## 2025-09-09 ENCOUNTER — APPOINTMENT (OUTPATIENT)
Dept: UROLOGY | Facility: CLINIC | Age: 80
End: 2025-09-09